# Patient Record
Sex: FEMALE | Race: WHITE | NOT HISPANIC OR LATINO | Employment: OTHER | URBAN - NONMETROPOLITAN AREA
[De-identification: names, ages, dates, MRNs, and addresses within clinical notes are randomized per-mention and may not be internally consistent; named-entity substitution may affect disease eponyms.]

---

## 2023-01-31 ENCOUNTER — APPOINTMENT (OUTPATIENT)
Dept: CARDIOLOGY | Facility: HOSPITAL | Age: 58
DRG: 221 | End: 2023-01-31
Payer: MEDICAID

## 2023-01-31 ENCOUNTER — APPOINTMENT (OUTPATIENT)
Dept: CT IMAGING | Facility: HOSPITAL | Age: 58
DRG: 221 | End: 2023-01-31
Payer: MEDICAID

## 2023-01-31 ENCOUNTER — HOSPITAL ENCOUNTER (INPATIENT)
Facility: HOSPITAL | Age: 58
LOS: 7 days | Discharge: HOME | DRG: 221 | End: 2023-02-07
Attending: SURGERY | Admitting: SURGERY
Payer: MEDICAID

## 2023-01-31 DIAGNOSIS — I71.012 DISSECTION OF DESCENDING THORACIC AORTA: Primary | ICD-10-CM

## 2023-01-31 DIAGNOSIS — Z78.9 DECREASED ACTIVITIES OF DAILY LIVING (ADL): ICD-10-CM

## 2023-01-31 DIAGNOSIS — Z74.09 IMPAIRED FUNCTIONAL MOBILITY AND ACTIVITY TOLERANCE: ICD-10-CM

## 2023-01-31 PROBLEM — R11.2 NAUSEA & VOMITING: Status: ACTIVE | Noted: 2023-01-31

## 2023-01-31 LAB
ABO GROUP BLD: NORMAL
ALBUMIN SERPL-MCNC: 3.7 G/DL (ref 3.5–5.2)
ALBUMIN/GLOB SERPL: 1.2 G/DL
ALP SERPL-CCNC: 143 U/L (ref 39–117)
ALT SERPL W P-5'-P-CCNC: 17 U/L (ref 1–33)
ANION GAP SERPL CALCULATED.3IONS-SCNC: 11 MMOL/L (ref 5–15)
APTT PPP: 37 SECONDS (ref 24.1–35)
AST SERPL-CCNC: 28 U/L (ref 1–32)
BASOPHILS # BLD AUTO: 0.04 10*3/MM3 (ref 0–0.2)
BASOPHILS NFR BLD AUTO: 0.3 % (ref 0–1.5)
BH CV ECHO MEAS - AO MAX PG: 18.7 MMHG
BH CV ECHO MEAS - AO MEAN PG: 8 MMHG
BH CV ECHO MEAS - AO ROOT DIAM: 4.2 CM
BH CV ECHO MEAS - AO V2 MAX: 216 CM/SEC
BH CV ECHO MEAS - AO V2 VTI: 44.5 CM
BH CV ECHO MEAS - AVA(I,D): 2.45 CM2
BH CV ECHO MEAS - EDV(CUBED): 117.6 ML
BH CV ECHO MEAS - EDV(MOD-SP4): 132 ML
BH CV ECHO MEAS - EF(MOD-SP4): 63.3 %
BH CV ECHO MEAS - ESV(CUBED): 17.6 ML
BH CV ECHO MEAS - ESV(MOD-SP4): 48.4 ML
BH CV ECHO MEAS - FS: 46.9 %
BH CV ECHO MEAS - IVS/LVPW: 1.18 CM
BH CV ECHO MEAS - IVSD: 1.3 CM
BH CV ECHO MEAS - LA DIMENSION: 4 CM
BH CV ECHO MEAS - LAT PEAK E' VEL: 4.9 CM/SEC
BH CV ECHO MEAS - LV DIASTOLIC VOL/BSA (35-75): 66.7 CM2
BH CV ECHO MEAS - LV MASS(C)D: 226.4 GRAMS
BH CV ECHO MEAS - LV MAX PG: 8.1 MMHG
BH CV ECHO MEAS - LV MEAN PG: 4 MMHG
BH CV ECHO MEAS - LV SYSTOLIC VOL/BSA (12-30): 24.5 CM2
BH CV ECHO MEAS - LV V1 MAX: 142 CM/SEC
BH CV ECHO MEAS - LV V1 VTI: 34.7 CM
BH CV ECHO MEAS - LVIDD: 4.9 CM
BH CV ECHO MEAS - LVIDS: 2.6 CM
BH CV ECHO MEAS - LVOT AREA: 3.1 CM2
BH CV ECHO MEAS - LVOT DIAM: 2 CM
BH CV ECHO MEAS - LVPWD: 1.1 CM
BH CV ECHO MEAS - MED PEAK E' VEL: 4.6 CM/SEC
BH CV ECHO MEAS - MV A MAX VEL: 87.4 CM/SEC
BH CV ECHO MEAS - MV DEC TIME: 0.27 MSEC
BH CV ECHO MEAS - MV E MAX VEL: 106 CM/SEC
BH CV ECHO MEAS - MV E/A: 1.21
BH CV ECHO MEAS - RAP SYSTOLE: 5 MMHG
BH CV ECHO MEAS - RVSP: 19.4 MMHG
BH CV ECHO MEAS - SI(MOD-SP4): 42.2 ML/M2
BH CV ECHO MEAS - SV(LVOT): 109 ML
BH CV ECHO MEAS - SV(MOD-SP4): 83.6 ML
BH CV ECHO MEAS - TR MAX PG: 14.4 MMHG
BH CV ECHO MEAS - TR MAX VEL: 190 CM/SEC
BH CV ECHO MEASUREMENTS AVERAGE E/E' RATIO: 22.32
BILIRUB SERPL-MCNC: 1 MG/DL (ref 0–1.2)
BLD GP AB SCN SERPL QL: NEGATIVE
BUN SERPL-MCNC: 17 MG/DL (ref 6–20)
BUN/CREAT SERPL: 47.2 (ref 7–25)
CALCIUM SPEC-SCNC: 8.7 MG/DL (ref 8.6–10.5)
CHLORIDE SERPL-SCNC: 105 MMOL/L (ref 98–107)
CO2 SERPL-SCNC: 27 MMOL/L (ref 22–29)
CREAT SERPL-MCNC: 0.36 MG/DL (ref 0.57–1)
D-LACTATE SERPL-SCNC: 1 MMOL/L (ref 0.5–2)
DEPRECATED RDW RBC AUTO: 50.1 FL (ref 37–54)
EGFRCR SERPLBLD CKD-EPI 2021: 118.6 ML/MIN/1.73
EOSINOPHIL # BLD AUTO: 0.13 10*3/MM3 (ref 0–0.4)
EOSINOPHIL NFR BLD AUTO: 0.9 % (ref 0.3–6.2)
ERYTHROCYTE [DISTWIDTH] IN BLOOD BY AUTOMATED COUNT: 14.3 % (ref 12.3–15.4)
GLOBULIN UR ELPH-MCNC: 3.1 GM/DL
GLUCOSE SERPL-MCNC: 125 MG/DL (ref 65–99)
HBA1C MFR BLD: 5.3 % (ref 4.8–5.6)
HCT VFR BLD AUTO: 41.7 % (ref 34–46.6)
HGB BLD-MCNC: 13.5 G/DL (ref 12–15.9)
IMM GRANULOCYTES # BLD AUTO: 0.08 10*3/MM3 (ref 0–0.05)
IMM GRANULOCYTES NFR BLD AUTO: 0.6 % (ref 0–0.5)
INR PPP: 1.38 (ref 0.91–1.09)
LYMPHOCYTES # BLD AUTO: 1.06 10*3/MM3 (ref 0.7–3.1)
LYMPHOCYTES NFR BLD AUTO: 7.3 % (ref 19.6–45.3)
MAXIMAL PREDICTED HEART RATE: 163 BPM
MCH RBC QN AUTO: 30.8 PG (ref 26.6–33)
MCHC RBC AUTO-ENTMCNC: 32.4 G/DL (ref 31.5–35.7)
MCV RBC AUTO: 95 FL (ref 79–97)
MONOCYTES # BLD AUTO: 1.71 10*3/MM3 (ref 0.1–0.9)
MONOCYTES NFR BLD AUTO: 11.8 % (ref 5–12)
NEUTROPHILS NFR BLD AUTO: 11.42 10*3/MM3 (ref 1.7–7)
NEUTROPHILS NFR BLD AUTO: 79.1 % (ref 42.7–76)
NRBC BLD AUTO-RTO: 0 /100 WBC (ref 0–0.2)
PLATELET # BLD AUTO: 205 10*3/MM3 (ref 140–450)
PMV BLD AUTO: 11.1 FL (ref 6–12)
POTASSIUM SERPL-SCNC: 3.9 MMOL/L (ref 3.5–5.2)
PROT SERPL-MCNC: 6.8 G/DL (ref 6–8.5)
PROTHROMBIN TIME: 17.2 SECONDS (ref 11.8–14.8)
RBC # BLD AUTO: 4.39 10*6/MM3 (ref 3.77–5.28)
RH BLD: NEGATIVE
SODIUM SERPL-SCNC: 143 MMOL/L (ref 136–145)
STRESS TARGET HR: 139 BPM
T&S EXPIRATION DATE: NORMAL
WBC NRBC COR # BLD: 14.44 10*3/MM3 (ref 3.4–10.8)

## 2023-01-31 PROCEDURE — 86901 BLOOD TYPING SEROLOGIC RH(D): CPT | Performed by: NURSE PRACTITIONER

## 2023-01-31 PROCEDURE — 86900 BLOOD TYPING SEROLOGIC ABO: CPT | Performed by: NURSE PRACTITIONER

## 2023-01-31 PROCEDURE — 74174 CTA ABD&PLVS W/CONTRAST: CPT

## 2023-01-31 PROCEDURE — 83605 ASSAY OF LACTIC ACID: CPT | Performed by: NURSE PRACTITIONER

## 2023-01-31 PROCEDURE — 85610 PROTHROMBIN TIME: CPT | Performed by: NURSE PRACTITIONER

## 2023-01-31 PROCEDURE — 0 IOPAMIDOL PER 1 ML: Performed by: SURGERY

## 2023-01-31 PROCEDURE — 71275 CT ANGIOGRAPHY CHEST: CPT

## 2023-01-31 PROCEDURE — 85025 COMPLETE CBC W/AUTO DIFF WBC: CPT | Performed by: NURSE PRACTITIONER

## 2023-01-31 PROCEDURE — 99223 1ST HOSP IP/OBS HIGH 75: CPT | Performed by: SURGERY

## 2023-01-31 PROCEDURE — 86850 RBC ANTIBODY SCREEN: CPT | Performed by: NURSE PRACTITIONER

## 2023-01-31 PROCEDURE — 93306 TTE W/DOPPLER COMPLETE: CPT

## 2023-01-31 PROCEDURE — 93306 TTE W/DOPPLER COMPLETE: CPT | Performed by: INTERNAL MEDICINE

## 2023-01-31 PROCEDURE — 83036 HEMOGLOBIN GLYCOSYLATED A1C: CPT | Performed by: NURSE PRACTITIONER

## 2023-01-31 PROCEDURE — 25010000002 ENOXAPARIN PER 10 MG: Performed by: NURSE PRACTITIONER

## 2023-01-31 PROCEDURE — 85730 THROMBOPLASTIN TIME PARTIAL: CPT | Performed by: NURSE PRACTITIONER

## 2023-01-31 PROCEDURE — 80053 COMPREHEN METABOLIC PANEL: CPT | Performed by: NURSE PRACTITIONER

## 2023-01-31 RX ORDER — PHENYTOIN SODIUM 100 MG/1
500 CAPSULE, EXTENDED RELEASE ORAL NIGHTLY
COMMUNITY

## 2023-01-31 RX ORDER — ESMOLOL HYDROCHLORIDE 10 MG/ML
25-300 INJECTION, SOLUTION INTRAVENOUS
Status: DISCONTINUED | OUTPATIENT
Start: 2023-01-31 | End: 2023-02-01

## 2023-01-31 RX ORDER — CARVEDILOL 6.25 MG/1
6.25 TABLET ORAL 2 TIMES DAILY WITH MEALS
Status: DISCONTINUED | OUTPATIENT
Start: 2023-01-31 | End: 2023-02-02

## 2023-01-31 RX ORDER — SODIUM CHLORIDE 0.9 % (FLUSH) 0.9 %
10 SYRINGE (ML) INJECTION AS NEEDED
Status: DISCONTINUED | OUTPATIENT
Start: 2023-01-31 | End: 2023-02-07 | Stop reason: HOSPADM

## 2023-01-31 RX ORDER — FLUOXETINE HYDROCHLORIDE 40 MG/1
40 CAPSULE ORAL DAILY
COMMUNITY

## 2023-01-31 RX ORDER — SODIUM CHLORIDE 9 MG/ML
40 INJECTION, SOLUTION INTRAVENOUS AS NEEDED
Status: DISCONTINUED | OUTPATIENT
Start: 2023-01-31 | End: 2023-02-07 | Stop reason: HOSPADM

## 2023-01-31 RX ORDER — PHENYTOIN SODIUM 100 MG/1
500 CAPSULE, EXTENDED RELEASE ORAL NIGHTLY
Status: DISCONTINUED | OUTPATIENT
Start: 2023-01-31 | End: 2023-02-07 | Stop reason: HOSPADM

## 2023-01-31 RX ORDER — CEPHALEXIN 500 MG/1
500 CAPSULE ORAL 3 TIMES DAILY
COMMUNITY
Start: 2023-01-30 | End: 2023-02-07 | Stop reason: HOSPADM

## 2023-01-31 RX ORDER — SODIUM CHLORIDE 0.9 % (FLUSH) 0.9 %
10 SYRINGE (ML) INJECTION EVERY 12 HOURS SCHEDULED
Status: DISCONTINUED | OUTPATIENT
Start: 2023-01-31 | End: 2023-02-07 | Stop reason: HOSPADM

## 2023-01-31 RX ORDER — ASPIRIN 81 MG/1
81 TABLET, CHEWABLE ORAL DAILY
COMMUNITY
End: 2023-02-07 | Stop reason: HOSPADM

## 2023-01-31 RX ORDER — ATORVASTATIN CALCIUM 10 MG/1
10 TABLET, FILM COATED ORAL DAILY
COMMUNITY

## 2023-01-31 RX ORDER — ATORVASTATIN CALCIUM 10 MG/1
10 TABLET, FILM COATED ORAL DAILY
Status: DISCONTINUED | OUTPATIENT
Start: 2023-01-31 | End: 2023-02-07 | Stop reason: HOSPADM

## 2023-01-31 RX ORDER — ENOXAPARIN SODIUM 100 MG/ML
40 INJECTION SUBCUTANEOUS EVERY 24 HOURS
Status: DISCONTINUED | OUTPATIENT
Start: 2023-01-31 | End: 2023-02-02 | Stop reason: SDUPTHER

## 2023-01-31 RX ORDER — FLUOXETINE HYDROCHLORIDE 20 MG/1
40 CAPSULE ORAL DAILY
Status: DISCONTINUED | OUTPATIENT
Start: 2023-01-31 | End: 2023-02-07 | Stop reason: HOSPADM

## 2023-01-31 RX ORDER — HYDROCODONE BITARTRATE AND ACETAMINOPHEN 5; 325 MG/1; MG/1
1 TABLET ORAL EVERY 6 HOURS PRN
Status: DISCONTINUED | OUTPATIENT
Start: 2023-01-31 | End: 2023-02-07 | Stop reason: HOSPADM

## 2023-01-31 RX ORDER — LOSARTAN POTASSIUM 50 MG/1
25 TABLET ORAL
Status: DISCONTINUED | OUTPATIENT
Start: 2023-01-31 | End: 2023-02-03

## 2023-01-31 RX ADMIN — FLUOXETINE HYDROCHLORIDE 40 MG: 20 CAPSULE ORAL at 16:41

## 2023-01-31 RX ADMIN — CARVEDILOL 6.25 MG: 6.25 TABLET, FILM COATED ORAL at 18:29

## 2023-01-31 RX ADMIN — Medication 10 ML: at 21:05

## 2023-01-31 RX ADMIN — Medication 1 APPLICATION: at 21:05

## 2023-01-31 RX ADMIN — ATORVASTATIN CALCIUM 10 MG: 10 TABLET, FILM COATED ORAL at 16:41

## 2023-01-31 RX ADMIN — NICARDIPINE HYDROCHLORIDE 15 MG/HR: 2.5 INJECTION, SOLUTION INTRAVENOUS at 13:45

## 2023-01-31 RX ADMIN — Medication 1 APPLICATION: at 14:33

## 2023-01-31 RX ADMIN — ENOXAPARIN SODIUM 40 MG: 100 INJECTION SUBCUTANEOUS at 16:41

## 2023-01-31 RX ADMIN — LOSARTAN POTASSIUM 25 MG: 50 TABLET, FILM COATED ORAL at 16:41

## 2023-01-31 RX ADMIN — IOPAMIDOL 100 ML: 755 INJECTION, SOLUTION INTRAVENOUS at 14:23

## 2023-01-31 RX ADMIN — SODIUM CHLORIDE 15 MG/HR: 9 INJECTION, SOLUTION INTRAVENOUS at 13:45

## 2023-01-31 RX ADMIN — HYDROCODONE BITARTRATE AND ACETAMINOPHEN 1 TABLET: 5; 325 TABLET ORAL at 18:29

## 2023-01-31 RX ADMIN — ESMOLOL HYDROCHLORIDE 25 MCG/KG/MIN: 10 INJECTION INTRAVENOUS at 14:29

## 2023-01-31 RX ADMIN — Medication 10 ML: at 14:33

## 2023-01-31 RX ADMIN — PHENYTOIN SODIUM 500 MG: 100 CAPSULE ORAL at 21:05

## 2023-02-01 ENCOUNTER — ANESTHESIA EVENT (OUTPATIENT)
Dept: PERIOP | Facility: HOSPITAL | Age: 58
DRG: 221 | End: 2023-02-01
Payer: MEDICAID

## 2023-02-01 PROBLEM — I71.00 AORTIC DISSECTION: Status: ACTIVE | Noted: 2023-02-01

## 2023-02-01 LAB
ANION GAP SERPL CALCULATED.3IONS-SCNC: 9 MMOL/L (ref 5–15)
BASOPHILS # BLD AUTO: 0.02 10*3/MM3 (ref 0–0.2)
BASOPHILS NFR BLD AUTO: 0.2 % (ref 0–1.5)
BUN SERPL-MCNC: 21 MG/DL (ref 6–20)
BUN/CREAT SERPL: 60 (ref 7–25)
CALCIUM SPEC-SCNC: 8 MG/DL (ref 8.6–10.5)
CHLORIDE SERPL-SCNC: 104 MMOL/L (ref 98–107)
CO2 SERPL-SCNC: 27 MMOL/L (ref 22–29)
CREAT SERPL-MCNC: 0.35 MG/DL (ref 0.57–1)
DEPRECATED RDW RBC AUTO: 51.8 FL (ref 37–54)
EGFRCR SERPLBLD CKD-EPI 2021: 119.4 ML/MIN/1.73
EOSINOPHIL # BLD AUTO: 0.49 10*3/MM3 (ref 0–0.4)
EOSINOPHIL NFR BLD AUTO: 5 % (ref 0.3–6.2)
ERYTHROCYTE [DISTWIDTH] IN BLOOD BY AUTOMATED COUNT: 14.7 % (ref 12.3–15.4)
GLUCOSE SERPL-MCNC: 100 MG/DL (ref 65–99)
HCT VFR BLD AUTO: 39.4 % (ref 34–46.6)
HGB BLD-MCNC: 12.7 G/DL (ref 12–15.9)
IMM GRANULOCYTES # BLD AUTO: 0.03 10*3/MM3 (ref 0–0.05)
IMM GRANULOCYTES NFR BLD AUTO: 0.3 % (ref 0–0.5)
LYMPHOCYTES # BLD AUTO: 1.49 10*3/MM3 (ref 0.7–3.1)
LYMPHOCYTES NFR BLD AUTO: 15.3 % (ref 19.6–45.3)
MCH RBC QN AUTO: 30.8 PG (ref 26.6–33)
MCHC RBC AUTO-ENTMCNC: 32.2 G/DL (ref 31.5–35.7)
MCV RBC AUTO: 95.4 FL (ref 79–97)
MONOCYTES # BLD AUTO: 1.35 10*3/MM3 (ref 0.1–0.9)
MONOCYTES NFR BLD AUTO: 13.8 % (ref 5–12)
NEUTROPHILS NFR BLD AUTO: 6.39 10*3/MM3 (ref 1.7–7)
NEUTROPHILS NFR BLD AUTO: 65.4 % (ref 42.7–76)
NRBC BLD AUTO-RTO: 0 /100 WBC (ref 0–0.2)
PLATELET # BLD AUTO: 174 10*3/MM3 (ref 140–450)
PMV BLD AUTO: 11 FL (ref 6–12)
POTASSIUM SERPL-SCNC: 4 MMOL/L (ref 3.5–5.2)
RBC # BLD AUTO: 4.13 10*6/MM3 (ref 3.77–5.28)
SODIUM SERPL-SCNC: 140 MMOL/L (ref 136–145)
WBC NRBC COR # BLD: 9.77 10*3/MM3 (ref 3.4–10.8)

## 2023-02-01 PROCEDURE — 25010000002 ENOXAPARIN PER 10 MG: Performed by: NURSE PRACTITIONER

## 2023-02-01 PROCEDURE — 99024 POSTOP FOLLOW-UP VISIT: CPT | Performed by: SURGERY

## 2023-02-01 PROCEDURE — 85025 COMPLETE CBC W/AUTO DIFF WBC: CPT | Performed by: NURSE PRACTITIONER

## 2023-02-01 PROCEDURE — 80048 BASIC METABOLIC PNL TOTAL CA: CPT | Performed by: NURSE PRACTITIONER

## 2023-02-01 PROCEDURE — 99222 1ST HOSP IP/OBS MODERATE 55: CPT | Performed by: SURGERY

## 2023-02-01 RX ORDER — PANTOPRAZOLE SODIUM 40 MG/10ML
40 INJECTION, POWDER, LYOPHILIZED, FOR SOLUTION INTRAVENOUS
Status: DISCONTINUED | OUTPATIENT
Start: 2023-02-02 | End: 2023-02-07 | Stop reason: HOSPADM

## 2023-02-01 RX ORDER — ESMOLOL HYDROCHLORIDE 10 MG/ML
25-300 INJECTION INTRAVENOUS CONTINUOUS
Status: DISCONTINUED | OUTPATIENT
Start: 2023-02-01 | End: 2023-02-02

## 2023-02-01 RX ORDER — CALCIUM CARBONATE 200(500)MG
2 TABLET,CHEWABLE ORAL 3 TIMES DAILY PRN
Status: DISCONTINUED | OUTPATIENT
Start: 2023-02-01 | End: 2023-02-07 | Stop reason: HOSPADM

## 2023-02-01 RX ORDER — FAMOTIDINE 10 MG/ML
20 INJECTION, SOLUTION INTRAVENOUS EVERY 12 HOURS SCHEDULED
Status: DISCONTINUED | OUTPATIENT
Start: 2023-02-01 | End: 2023-02-02 | Stop reason: SDUPTHER

## 2023-02-01 RX ADMIN — NICARDIPINE HYDROCHLORIDE 7.5 MG/HR: 2.5 INJECTION, SOLUTION INTRAVENOUS at 23:44

## 2023-02-01 RX ADMIN — ATORVASTATIN CALCIUM 10 MG: 10 TABLET, FILM COATED ORAL at 08:27

## 2023-02-01 RX ADMIN — NICARDIPINE HYDROCHLORIDE 12.5 MG/HR: 2.5 INJECTION, SOLUTION INTRAVENOUS at 05:37

## 2023-02-01 RX ADMIN — NICARDIPINE HYDROCHLORIDE 5 MG/HR: 2.5 INJECTION, SOLUTION INTRAVENOUS at 03:21

## 2023-02-01 RX ADMIN — NICARDIPINE HYDROCHLORIDE 15 MG/HR: 2.5 INJECTION, SOLUTION INTRAVENOUS at 07:39

## 2023-02-01 RX ADMIN — Medication 10 ML: at 20:00

## 2023-02-01 RX ADMIN — NICARDIPINE HYDROCHLORIDE 5 MG/HR: 2.5 INJECTION, SOLUTION INTRAVENOUS at 19:37

## 2023-02-01 RX ADMIN — LOSARTAN POTASSIUM 25 MG: 50 TABLET, FILM COATED ORAL at 08:27

## 2023-02-01 RX ADMIN — PHENYTOIN SODIUM 500 MG: 100 CAPSULE ORAL at 20:00

## 2023-02-01 RX ADMIN — ENOXAPARIN SODIUM 40 MG: 100 INJECTION SUBCUTANEOUS at 16:21

## 2023-02-01 RX ADMIN — CARVEDILOL 6.25 MG: 6.25 TABLET, FILM COATED ORAL at 18:17

## 2023-02-01 RX ADMIN — FAMOTIDINE 20 MG: 10 INJECTION, SOLUTION INTRAVENOUS at 21:13

## 2023-02-01 RX ADMIN — Medication 1 APPLICATION: at 08:28

## 2023-02-01 RX ADMIN — ANTACID TABLETS 2 TABLET: 500 TABLET, CHEWABLE ORAL at 23:33

## 2023-02-01 RX ADMIN — Medication 10 ML: at 08:28

## 2023-02-01 RX ADMIN — CARVEDILOL 6.25 MG: 6.25 TABLET, FILM COATED ORAL at 08:28

## 2023-02-01 RX ADMIN — Medication 1 APPLICATION: at 20:00

## 2023-02-01 RX ADMIN — FLUOXETINE HYDROCHLORIDE 40 MG: 20 CAPSULE ORAL at 08:28

## 2023-02-01 NOTE — CASE MANAGEMENT/SOCIAL WORK
Discharge Planning Assessment  [facility]     Patient Name: Shilpa Zhang  MRN: [id number]  Today's Date: 2/1/2023    Admit Date: 1/31/2023        Discharge Needs Assessment     Row Name 02/01/23 1038       Living Environment    People in Home alone    Current Living Arrangements home    Primary Care Provided by self    Provides Primary Care For no one    Family Caregiver if Needed child(miroslava), adult;friend(s)    Family Caregiver Names Eva    Able to Return to Prior Arrangements yes       Resource/Environmental Concerns    Resource/Environmental Concerns none       Food Insecurity    Within the past 12 months, you worried that your food would run out before you got the money to buy more. Never true    Within the past 12 months, the food you bought just didn't last and you didn't have money to get more. Never true       Transition Planning    Patient/Family Anticipates Transition to home    Transportation Anticipated family or friend will provide       Discharge Needs Assessment    Readmission Within the Last 30 Days no previous admission in last 30 days    Equipment Currently Used at Home none    Concerns to be Addressed no discharge needs identified    Equipment Needed After Discharge none    Discharge Coordination/Progress spoke to patient who lives alone; has no insurance coverage filled out medicaid paperwork at [facility] does have PCP; independent at home prior to illness will follow for DC needs  Spoke to [employer] worker and they will talk to patient about medicaid application will follow               Discharge Plan    No documentation.               Continued Care and Services - Admitted Since 1/31/2023    Coordination has not been started for this encounter.          Demographic Summary    No documentation.                Functional Status    No documentation.                Psychosocial    No documentation.                Abuse/Neglect    No documentation.                Legal    No documentation.                 Substance Abuse    No documentation.                Patient Forms    No documentation.                   Jayde Levi RN

## 2023-02-01 NOTE — PLAN OF CARE
Goal Outcome Evaluation:  Plan of Care Reviewed With: patient        Progress: no change  Outcome Evaluation: Ntn assessment completed. Pt reports she has had a decrease in appetite over the past few months. Encouraged oral intake with meals. Cont to follow for plan of care.

## 2023-02-01 NOTE — PLAN OF CARE
Goal Outcome Evaluation:      Pt remained A&Ox4 and afebrile throughout the shift. PERRLA. Pt was on Esmolol and needed to be titrated up due to increasing BP. Shortly after titrating up, she began to rosemary down into the 40s. Pt was switched to cardene drip. VSS. Cardene currently at 15. Pulses palpable. Urine output adequate. Urine is a dark remigio color. No current complaints of pain. Currently sitting up in chair. Safety maintained.

## 2023-02-01 NOTE — CONSULTS
Shilpa Zhang  [id number]  [id number]  I004/1  Riley Jefferson MD  1/31/2023    CC:ERICK    HPI: Ms. Zhang is a 57-year-old female with known history of hyperlipidemia and seizure disorder.  She reports that she developed upper respiratory-like symptoms approximately 1 week ago along with dull aching back pain between her shoulder blades.  She was treated as an outpatient with antibiotics however she noticed no improvement.  She does admit that she had an episode of vomiting with this pain x1 event 4 days ago.  At this time she went to stay with her dad because she did not feel well.  She reports increasing fatigue and difficulty getting up and moving around since symptoms began.  Back pain continue to linger which prompted her to present to [facility] ER today for further evaluation.  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  Given this she was transferred to [facility] for higher level of care.  During transport she was transfused 1 unit PRBC.  She does complain of ongoing back pain but states at present the pain is dull.  She is a non-smoker.  She had a CTA of the chest/abdomen/pelvis in which I personally reviewed.    History reviewed. No pertinent past medical history.    Past Surgical History:   Procedure Laterality Date   • ABDOMINAL SURGERY         Family History   Problem Relation Age of Onset   • Diabetes Mother    • Diabetes Father        Social History     Socioeconomic History   • Marital status:    Tobacco Use   • Smoking status: Never   • Smokeless tobacco: Never   Vaping Use   • Vaping Use: Never used   Substance and Sexual Activity   • Drug use: Never   • Sexual activity: Not Currently       No Known Allergies    Hospital Medications (active)       Dose Frequency Start End    atorvastatin  (LIPITOR) tablet 10 mg 10 mg Daily 1/31/2023     Admin Instructions: Avoid grapefruit juice.    Route: Oral    carvedilol (COREG) tablet 6.25 mg 6.25 mg 2 Times Daily With Meals 1/31/2023     Admin Instructions: Give with food.    Route: Oral    Enoxaparin Sodium (LOVENOX) syringe 40 mg 40 mg Every 24 Hours 1/31/2023     Admin Instructions: Give subcutaneous in abdomen only. Do not massage site after injection.    Route: Subcutaneous    esmolol (BREVIBLOC) 2500 mg in 250 mL infusion  mcg/kg/min × 88.6 kg Continuous 2/1/2023     Admin Instructions: Initiate infusion at 25 mcg/kg/min and titrate up or down by 25-50 mcg/kg/min every 4 minutes to use the lowest dose possible to maintain heart rate less than 120 or SBP less than 120 mm Hg. Hold infusion for SBP less than 90 mm Hg or heart rate less Than 60. Maximum dose = 300 mcg/kg/min. Contact provider if unable to maintain SBP less than 120 mm Hg or heart rate less than 120 on maximum dose. Once SBP target or heart rate achieved obtain vitals a minimum of every 30 minutes. For patients not in critical care areas - obtain vitals a minimum of every 4 hours.    Route: Intravenous    FLUoxetine (PROzac) capsule 40 mg 40 mg Daily 1/31/2023     Admin Instructions: Caution: Look alike/sound alike drug alert    Route: Oral    HYDROcodone-acetaminophen (NORCO) 5-325 MG per tablet 1 tablet 1 tablet Every 6 Hours PRN 1/31/2023 2/7/2023    Admin Instructions: Based on patient request - if ordered for moderate or severe pain, provider allows for administration of a medication prescribed for a lower pain scale.  [LEXIS]    Do not exceed 4 grams of acetaminophen in a 24 hr period. Max dose of 2gm for AST/ALT greater than 120 units/L        If given for pain, use the following pain scale:   Mild Pain = Pain Score of 1-3, CPOT 1-2  Moderate Pain = Pain Score of 4-6, CPOT 3-4  Severe Pain = Pain Score of 7-10, CPOT 5-8    Route: Oral    losartan (COZAAR) tablet 25 mg 25 mg Every 24  Hours Scheduled 1/31/2023     Route: Oral    mupirocin (BACTROBAN) 2 % nasal ointment 1 application 1 application 2 Times Daily 1/31/2023 2/5/2023    Admin Instructions: Begin on day 1 of ICU admission and continue for 5 days OR until critical care discharge (if prior to 5 days).      Route: Each Nare    niCARdipine (CARDENE) 25 mg in 250 mL NS infusion 5-15 mg/hr Titrated 1/31/2023     Admin Instructions: Initiate infusion at 5 mg/hr and titrate up or down by 2.5 - 5 mg/hr every 15 minutes to use the lowest dose possible to maintain SBP less than 120 mm Hg. Maximum dose = 15 mg/hr. Hold infusion for SBP less than 90 mm Hg. Contact provider if unable to maintain SBP Less Than 120 mm Hg on maximum dose. Once SBP target achieved obtain vitals a minimum of every 30 minutes.   Administer as a slow continuous infusion via central line or through a large peripheral vein. Peripheral venous irritation may be minimized by changing the site of infusion every 12 hours. Nicardipine 0.2mg/mL concentration must be infused via central line ONLY.  Caution: Look alike/sound alike drug alert.   Protect from light. Do NOT refrigerate.    Route: Intravenous    phenytoin ER (DILANTIN) capsule 500 mg 500 mg Nightly 1/31/2023     Admin Instructions: Do not crush. Hold enteral nutrition at least 1 hour before and 2 hours after dose. Monitor drug levels.    Route: Oral    sodium chloride 0.9 % flush 10 mL 10 mL Every 12 Hours Scheduled 1/31/2023     Route: Intravenous    sodium chloride 0.9 % flush 10 mL 10 mL As Needed 1/31/2023     Route: Intravenous    sodium chloride 0.9 % infusion 40 mL 40 mL As Needed 1/31/2023     Admin Instructions: Following administration of an IV intermittent medication, flush line with 40mL NS at 100mL/hr.    Route: Intravenous          Review of Systems   Constitutional: Negative.    HENT: Negative.    Eyes: Negative.    Cardiovascular: Positive for chest pain.   Gastrointestinal: Negative.    Endocrine:  "Negative.    Genitourinary: Negative.    Musculoskeletal: Positive for back pain.   Skin: Negative.    Allergic/Immunologic: Negative.    Neurological: Negative.    Hematological: Negative.    Psychiatric/Behavioral: Negative.    All other systems reviewed and are negative.      /54   Pulse 62   Temp 97.4 °F (36.3 °C) (Axillary)   Resp 20   Ht 167.6 cm (66\")   Wt 88.6 kg (195 lb 6.4 oz)   SpO2 95%   BMI 31.54 kg/m²     Physical Exam  Vitals and nursing note reviewed.   Constitutional:       Appearance: She is well-developed.   HENT:      Head: Normocephalic and atraumatic.   Eyes:      General: No scleral icterus.     Pupils: Pupils are equal, round, and reactive to light.   Neck:      Thyroid: No thyromegaly.      Vascular: No carotid bruit or JVD.   Cardiovascular:      Rate and Rhythm: Normal rate and regular rhythm.      Pulses:           Carotid pulses are 2+ on the right side and 2+ on the left side.       Femoral pulses are 2+ on the right side and 2+ on the left side.       Popliteal pulses are 2+ on the right side and 2+ on the left side.        Dorsalis pedis pulses are 2+ on the right side and 2+ on the left side.        Posterior tibial pulses are 2+ on the right side and 2+ on the left side.      Heart sounds: Normal heart sounds.   Pulmonary:      Effort: Pulmonary effort is normal.      Breath sounds: Normal breath sounds.   Abdominal:      General: Bowel sounds are normal. There is no distension or abdominal bruit.      Palpations: Abdomen is soft. There is no mass.      Tenderness: There is abdominal tenderness.      Comments: Obese   Musculoskeletal:         General: Normal range of motion.      Cervical back: Neck supple.   Lymphadenopathy:      Cervical: No cervical adenopathy.   Skin:     General: Skin is warm and dry.   Neurological:      Mental Status: She is alert and oriented to person, place, and time.      Cranial Nerves: No cranial nerve deficit.      Sensory: No sensory " deficit.         Laboratory Data:  Results from last 7 days   Lab Units 02/01/23  0503 01/31/23  1406   WBC 10*3/mm3 9.77 14.44*   HEMOGLOBIN g/dL 12.7 13.5   HEMATOCRIT % 39.4 41.7   PLATELETS 10*3/mm3 174 205       Results from last 7 days   Lab Units 02/01/23  0503 01/31/23  1406   SODIUM mmol/L 140 143   POTASSIUM mmol/L 4.0 3.9   CHLORIDE mmol/L 104 105   CO2 mmol/L 27.0 27.0   BUN mg/dL 21* 17   CREATININE mg/dL 0.35* 0.36*   CALCIUM mg/dL 8.0* 8.7   BILIRUBIN mg/dL  --  1.0   ALK PHOS U/L  --  143*   ALT (SGPT) U/L  --  17   AST (SGOT) U/L  --  28   GLUCOSE mg/dL 100* 125*     Results from last 7 days   Lab Units 01/31/23  1406   INR  1.38*   APTT seconds 37.0*         Diagnostic Data:  Imaging Results (Last 24 Hours)     Procedure Component Value Units Date/Time    CT Angiogram Chest [[id number]] Collected: 01/31/23 1450     Updated: 01/31/23 1501    Narrative:      CT ANGIOGRAM CHEST- 1/31/2023 2:09 PM CST     HISTORY: evaluate aortic dissection      COMPARISON: None     DOSE LENGTH PRODUCT: 1118 mGy cm. Automated exposure control was also  utilized to decrease patient radiation dose.     TECHNIQUE: Axial images of the chest are performed following IV  contrast. 2-D, 3-D, maximal intensity projectional reconstructed images  reveal     FINDINGS:  There is a thoracic aortic dissecting aneurysm involving the  distal thoracic aortic arch, originating just beyond the origin of the  left subclavian artery. The root of the ascending aorta measures 4.7 cm.  Ascending aorta at the level of the main pulmonary artery measuring 4.6  cm. The proximal aortic arch measures 2.9 cm. Common origin right  brachiocephalic and left common carotid arteries considered from the  arch of the aorta. No aneurysm or dissection extending into the great  vessels.     The distal aortic arch measures approximately 5.5 cm in greatest width  with notable intimal flap. The descending thoracic aorta at the level of  the left atrium measures  approximately 5.2 cm. The aorta at the level of  the diaphragmatic hiatus measuring 4.7 cm. The true lumen is position  along the right aspect of the descending thoracic aorta. No evidence for  aneurysm leak. The there is cardiomegaly. No paracardial effusion. Trace  left pleural effusion. No pathologic intrathoracic or axillary  lymphadenopathy.     Please refer to CT abdomen and pelvis report for findings below the  hemidiaphragms.     Bibasilar atelectasis. No lobar consolidation or suspicious pulmonary  nodule. No pneumothorax. No endobronchial lesion.     Degenerative change of the thoracic spine. No focal destructive osseous  lesions.       Impression:      1. Thoracic aortic aneurysm with dissection. Dissection again within the  distal aortic arch beyond/distal to the origin of the left subclavian  artery. The ascending thoracic aorta without dissection measuring up to  4.7 cm. The distal arch of the aorta measuring up to 5.5 cm.  Cardiomegaly. No paracardial effusion.  2. Small left pleural effusion with basilar atelectasis.  This report was finalized on 01/31/2023 14:58 by Dr. Chantelle Wan MD.    CT Angiogram Abdomen Pelvis [[id number]] Collected: 01/31/23 1443     Updated: 01/31/23 1453    Narrative:      CT ANGIOGRAM ABDOMEN PELVIS- 1/31/2023 2:09 PM CST     HISTORY: evaluate aortic dissection      COMPARISON: None     DOSE LENGTH PRODUCT: 1118 mGy cm. Automated exposure control was also  utilized to decrease patient radiation dose.     TECHNIQUE: Axial images of the abdomen and pelvis are performed  following IV contrast. 2-D and maximal intensity projectional  reconstructed images are reviewed.     FINDINGS:  There is a thoracoabdominal aortic dissection beginning at  the arch of the thoracic aorta extending through the aortic bifurcation  involving the left common iliac artery. The aorta at the diaphragmatic  hiatus measures 4.5 cm. The celiac trunk, superior mesenteric, bilateral  renal arteries  originate from the true lumen. Inferior mesenteric artery  appears to originate from the false lumen. The infrarenal abdominal  aorta is normal in caliber measuring 2.5 cm. No intimal flap is  identified within the internal/external iliac arteries.     There is cardiomegaly. Small left pleural effusion. Please refer to CT  chest report for findings above the hemidiaphragms.     No focal liver or splenic mass. No pancreatic cyst or mass. Gallbladder  unremarkable. No suspicious adrenal nodule. No enhancing renal mass. No  hydronephrosis. Mcfarland catheter within the decompressed bladder. Bladder  contour the uterus may indicate the presence of leiomyomas. No discrete  adnexal mass. No free intraperitoneal air or loculated abscess. No  evidence for bowel obstruction. Ventral abdominal wall hernia mesh in  place. Marked thinning of the abdominal wall musculature. Small hiatal  hernia. No pathologic lymphadenopathy.     Degenerative change of the regional skeleton bony hemangioma within the  L2 vertebra.       Impression:      1. Thoracoabdominal aortic dissection extending from the thoracic aortic  arch through the aortic bifurcation into the left common iliac artery.  Aorta measuring 4.5 cm at the diaphragmatic hiatus. The celiac trunk,  superior mesenteric, bilateral renal arteries originate from the true  lumen. Inferior mesenteric artery originating from the false lumen. No  radiographic evidence of bowel ischemia.  2. Small hiatal hernia.  3. Previous ventral abdominal wall hernia repair with mesh in place.  4. Small left pleural effusion with cardiomegaly.  This report was finalized on 01/31/2023 14:50 by Dr. Chantelle Wan MD.          Impression:    Aortic dissection (HCC)    Dissection of descending thoracic aorta    Nausea & vomiting  Type B aortic dissection  Ascending aortic aneurysm      Plan: After thoroughly evaluating Tamberly Ray, I believe the best course of action is to proceed with TEVAR with coverage  of the LSA and laser fenestration and stenting of the LSA.  She has a bovine arch which lends to more proximal landing zone where we can land the graft at the takeoff of the innominate artery.  She has intramural hematoma extending up and surrounding the left subclavian artery.  The graft will likely extend down to near the level of the celiac artery.  Because of this, she will need spinal drainage to be performed by either neurosurgery or anesthesia.  Risks/benefits were explained at great length to the patient which include but not limited to bleeding, infection, vessel damage, nerve damage, MI, stroke, paralysis, and death.  The patient understands the risks and wishes for us to proceed.  This case has been extensively discussed with Dr. Jefferson.  Continue with strict blood pressure control.  Thank you for allowing me to see Shilpa Zhang in consult. Please feel free to reach out with any questions or concerns.    Jonnathan Farrar,   2/1/2023  13:44 CST

## 2023-02-01 NOTE — PROGRESS NOTES
"Patient name: Shilpa Zhang  Patient : 1965  VISIT # [id number]  MR #[id number]    Procedure:  Procedure Date:  POD:* No surgery found *    Subjective   Chief complaint: Back pain    Patient is resting in chair in the ICU tolerating room air.  Blood pressure is currently well controlled on Cardene.  Esmolol was stopped overnight due to an episode of bradycardia in the 40s.  Blood pressure currently is 116/40.  She states her back pain is somewhat better but still dull.  Nausea and abdominal pain is improved today.    Telemetry: Sinus rhythm 60s   IV drips: Cardene    ROS: No fevers or chills.  No shortness of breath.  Improving back pain.  No chest pain.       Objective     Visit Vitals  /51   Pulse 63   Temp 97.6 °F (36.4 °C) (Oral)   Resp 20   Ht 167.6 cm (66\")   Wt 88.6 kg (195 lb 6.4 oz)   SpO2 92%   BMI 31.54 kg/m²       Intake/Output Summary (Last 24 hours) at 2023 0846  Last data filed at 2023 0408  Gross per 24 hour   Intake 514.29 ml   Output 650 ml   Net -135.71 ml       Lab:     CBC:  Results from last 7 days   Lab Units 23  0503 23  1406   WBC 10*3/mm3 9.77 14.44*   HEMATOCRIT % 39.4 41.7   PLATELETS 10*3/mm3 174 205          BMP:  Results from last 7 days   Lab Units 23  0503 23  1406   SODIUM mmol/L 140 143   POTASSIUM mmol/L 4.0 3.9   CHLORIDE mmol/L 104 105   CO2 mmol/L 27.0 27.0   GLUCOSE mg/dL 100* 125*   BUN mg/dL 21* 17   CREATININE mg/dL 0.35* 0.36*          COAG:  Results from last 7 days   Lab Units 23  1406   INR  1.38*   APTT seconds 37.0*       IMAGES:       Imaging Results (Last 24 Hours)     Procedure Component Value Units Date/Time    CT Angiogram Chest [[id number]] Collected: 23 1450     Updated: 23 1501    Narrative:      CT ANGIOGRAM CHEST- 2023 2:09 PM CST     HISTORY: evaluate aortic dissection      COMPARISON: None     DOSE LENGTH PRODUCT: 1118 mGy cm. Automated exposure control was also  utilized to decrease " patient radiation dose.     TECHNIQUE: Axial images of the chest are performed following IV  contrast. 2-D, 3-D, maximal intensity projectional reconstructed images  reveal     FINDINGS:  There is a thoracic aortic dissecting aneurysm involving the  distal thoracic aortic arch, originating just beyond the origin of the  left subclavian artery. The root of the ascending aorta measures 4.7 cm.  Ascending aorta at the level of the main pulmonary artery measuring 4.6  cm. The proximal aortic arch measures 2.9 cm. Common origin right  brachiocephalic and left common carotid arteries considered from the  arch of the aorta. No aneurysm or dissection extending into the great  vessels.     The distal aortic arch measures approximately 5.5 cm in greatest width  with notable intimal flap. The descending thoracic aorta at the level of  the left atrium measures approximately 5.2 cm. The aorta at the level of  the diaphragmatic hiatus measuring 4.7 cm. The true lumen is position  along the right aspect of the descending thoracic aorta. No evidence for  aneurysm leak. The there is cardiomegaly. No paracardial effusion. Trace  left pleural effusion. No pathologic intrathoracic or axillary  lymphadenopathy.     Please refer to CT abdomen and pelvis report for findings below the  hemidiaphragms.     Bibasilar atelectasis. No lobar consolidation or suspicious pulmonary  nodule. No pneumothorax. No endobronchial lesion.     Degenerative change of the thoracic spine. No focal destructive osseous  lesions.       Impression:      1. Thoracic aortic aneurysm with dissection. Dissection again within the  distal aortic arch beyond/distal to the origin of the left subclavian  artery. The ascending thoracic aorta without dissection measuring up to  4.7 cm. The distal arch of the aorta measuring up to 5.5 cm.  Cardiomegaly. No paracardial effusion.  2. Small left pleural effusion with basilar atelectasis.  This report was finalized on  01/31/2023 14:58 by Dr. Chantelle Wan MD.    CT Angiogram Abdomen Pelvis [[id number]] Collected: 01/31/23 1443     Updated: 01/31/23 1453    Narrative:      CT ANGIOGRAM ABDOMEN PELVIS- 1/31/2023 2:09 PM CST     HISTORY: evaluate aortic dissection      COMPARISON: None     DOSE LENGTH PRODUCT: 1118 mGy cm. Automated exposure control was also  utilized to decrease patient radiation dose.     TECHNIQUE: Axial images of the abdomen and pelvis are performed  following IV contrast. 2-D and maximal intensity projectional  reconstructed images are reviewed.     FINDINGS:  There is a thoracoabdominal aortic dissection beginning at  the arch of the thoracic aorta extending through the aortic bifurcation  involving the left common iliac artery. The aorta at the diaphragmatic  hiatus measures 4.5 cm. The celiac trunk, superior mesenteric, bilateral  renal arteries originate from the true lumen. Inferior mesenteric artery  appears to originate from the false lumen. The infrarenal abdominal  aorta is normal in caliber measuring 2.5 cm. No intimal flap is  identified within the internal/external iliac arteries.     There is cardiomegaly. Small left pleural effusion. Please refer to CT  chest report for findings above the hemidiaphragms.     No focal liver or splenic mass. No pancreatic cyst or mass. Gallbladder  unremarkable. No suspicious adrenal nodule. No enhancing renal mass. No  hydronephrosis. Mcfarland catheter within the decompressed bladder. Bladder  contour the uterus may indicate the presence of leiomyomas. No discrete  adnexal mass. No free intraperitoneal air or loculated abscess. No  evidence for bowel obstruction. Ventral abdominal wall hernia mesh in  place. Marked thinning of the abdominal wall musculature. Small hiatal  hernia. No pathologic lymphadenopathy.     Degenerative change of the regional skeleton bony hemangioma within the  L2 vertebra.       Impression:      1. Thoracoabdominal aortic dissection  extending from the thoracic aortic  arch through the aortic bifurcation into the left common iliac artery.  Aorta measuring 4.5 cm at the diaphragmatic hiatus. The celiac trunk,  superior mesenteric, bilateral renal arteries originate from the true  lumen. Inferior mesenteric artery originating from the false lumen. No  radiographic evidence of bowel ischemia.  2. Small hiatal hernia.  3. Previous ventral abdominal wall hernia repair with mesh in place.  4. Small left pleural effusion with cardiomegaly.  This report was finalized on 01/31/2023 14:50 by Dr. Chantelle Wan MD.            Physical Exam:  General: Alert, oriented. No apparent distress.  Obese  Cardiovascular: Regular rate and rhythm without murmur, rubs, or gallops.    Pulmonary: Clear to auscultation bilaterally without wheezing, rubs, or rales.  Abdomen: Soft, nondistended, and nontender.  Extremities: Warm, moves all extremities. No edema.   Neurologic:  Grossly intact with no focal deficits.            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  If no further bradycardia with beta-blocker dose this morning, will increase dosage  Discontinue Mcfarland catheter  Dr. Jefferson has discussed with Dr. Farrar as well as [employer] rep in regards to an appropriate time to proceed with TEVAR.  Decision making regarding timing is still in progress.  If unable to get better blood pressure control and wean down on Cardene, will discuss proceeding with TEVAR in the near future.  Discussed with patient and nursing.      ARTI Roger  02/01/23  08:46 CST

## 2023-02-02 ENCOUNTER — APPOINTMENT (OUTPATIENT)
Dept: INTERVENTIONAL RADIOLOGY/VASCULAR | Facility: HOSPITAL | Age: 58
DRG: 221 | End: 2023-02-02
Payer: MEDICAID

## 2023-02-02 ENCOUNTER — ANESTHESIA (OUTPATIENT)
Dept: PERIOP | Facility: HOSPITAL | Age: 58
DRG: 221 | End: 2023-02-02
Payer: MEDICAID

## 2023-02-02 ENCOUNTER — APPOINTMENT (OUTPATIENT)
Dept: GENERAL RADIOLOGY | Facility: HOSPITAL | Age: 58
DRG: 221 | End: 2023-02-02
Payer: MEDICAID

## 2023-02-02 LAB
ANION GAP SERPL CALCULATED.3IONS-SCNC: 6 MMOL/L (ref 5–15)
ANION GAP SERPL CALCULATED.3IONS-SCNC: 9 MMOL/L (ref 5–15)
BASOPHILS # BLD AUTO: 0.03 10*3/MM3 (ref 0–0.2)
BASOPHILS NFR BLD AUTO: 0.3 % (ref 0–1.5)
BUN SERPL-MCNC: 10 MG/DL (ref 6–20)
BUN SERPL-MCNC: 10 MG/DL (ref 6–20)
BUN/CREAT SERPL: 22.7 (ref 7–25)
BUN/CREAT SERPL: 40 (ref 7–25)
CALCIUM SPEC-SCNC: 7.7 MG/DL (ref 8.6–10.5)
CALCIUM SPEC-SCNC: 8.3 MG/DL (ref 8.6–10.5)
CHLORIDE SERPL-SCNC: 103 MMOL/L (ref 98–107)
CHLORIDE SERPL-SCNC: 106 MMOL/L (ref 98–107)
CO2 SERPL-SCNC: 24 MMOL/L (ref 22–29)
CO2 SERPL-SCNC: 26 MMOL/L (ref 22–29)
CREAT SERPL-MCNC: 0.25 MG/DL (ref 0.57–1)
CREAT SERPL-MCNC: 0.44 MG/DL (ref 0.57–1)
DEPRECATED RDW RBC AUTO: 50.2 FL (ref 37–54)
DEPRECATED RDW RBC AUTO: 51.4 FL (ref 37–54)
EGFRCR SERPLBLD CKD-EPI 2021: 113 ML/MIN/1.73
EGFRCR SERPLBLD CKD-EPI 2021: 129.5 ML/MIN/1.73
EOSINOPHIL # BLD AUTO: 0.21 10*3/MM3 (ref 0–0.4)
EOSINOPHIL NFR BLD AUTO: 2 % (ref 0.3–6.2)
ERYTHROCYTE [DISTWIDTH] IN BLOOD BY AUTOMATED COUNT: 14.3 % (ref 12.3–15.4)
ERYTHROCYTE [DISTWIDTH] IN BLOOD BY AUTOMATED COUNT: 14.4 % (ref 12.3–15.4)
GLUCOSE BLDC GLUCOMTR-MCNC: 157 MG/DL (ref 70–130)
GLUCOSE SERPL-MCNC: 139 MG/DL (ref 65–99)
GLUCOSE SERPL-MCNC: 143 MG/DL (ref 65–99)
HCT VFR BLD AUTO: 35.8 % (ref 34–46.6)
HCT VFR BLD AUTO: 39.1 % (ref 34–46.6)
HGB BLD-MCNC: 11.2 G/DL (ref 12–15.9)
HGB BLD-MCNC: 12.5 G/DL (ref 12–15.9)
IMM GRANULOCYTES # BLD AUTO: 0.04 10*3/MM3 (ref 0–0.05)
IMM GRANULOCYTES NFR BLD AUTO: 0.4 % (ref 0–0.5)
LYMPHOCYTES # BLD AUTO: 0.95 10*3/MM3 (ref 0.7–3.1)
LYMPHOCYTES NFR BLD AUTO: 8.8 % (ref 19.6–45.3)
MAGNESIUM SERPL-MCNC: 1.9 MG/DL (ref 1.6–2.6)
MCH RBC QN AUTO: 30.3 PG (ref 26.6–33)
MCH RBC QN AUTO: 30.3 PG (ref 26.6–33)
MCHC RBC AUTO-ENTMCNC: 31.3 G/DL (ref 31.5–35.7)
MCHC RBC AUTO-ENTMCNC: 32 G/DL (ref 31.5–35.7)
MCV RBC AUTO: 94.9 FL (ref 79–97)
MCV RBC AUTO: 96.8 FL (ref 79–97)
MONOCYTES # BLD AUTO: 1.45 10*3/MM3 (ref 0.1–0.9)
MONOCYTES NFR BLD AUTO: 13.5 % (ref 5–12)
NEUTROPHILS NFR BLD AUTO: 75 % (ref 42.7–76)
NEUTROPHILS NFR BLD AUTO: 8.08 10*3/MM3 (ref 1.7–7)
NRBC BLD AUTO-RTO: 0 /100 WBC (ref 0–0.2)
PLATELET # BLD AUTO: 165 10*3/MM3 (ref 140–450)
PLATELET # BLD AUTO: 181 10*3/MM3 (ref 140–450)
PMV BLD AUTO: 10.9 FL (ref 6–12)
PMV BLD AUTO: 11.5 FL (ref 6–12)
POTASSIUM SERPL-SCNC: 4 MMOL/L (ref 3.5–5.2)
POTASSIUM SERPL-SCNC: 4.3 MMOL/L (ref 3.5–5.2)
RBC # BLD AUTO: 3.7 10*6/MM3 (ref 3.77–5.28)
RBC # BLD AUTO: 4.12 10*6/MM3 (ref 3.77–5.28)
SODIUM SERPL-SCNC: 136 MMOL/L (ref 136–145)
SODIUM SERPL-SCNC: 138 MMOL/L (ref 136–145)
WBC NRBC COR # BLD: 10.76 10*3/MM3 (ref 3.4–10.8)
WBC NRBC COR # BLD: 10.86 10*3/MM3 (ref 3.4–10.8)

## 2023-02-02 PROCEDURE — 25010000002 FENTANYL CITRATE (PF) 100 MCG/2ML SOLUTION: Performed by: NURSE ANESTHETIST, CERTIFIED REGISTERED

## 2023-02-02 PROCEDURE — C1894 INTRO/SHEATH, NON-LASER: HCPCS | Performed by: SURGERY

## 2023-02-02 PROCEDURE — 80048 BASIC METABOLIC PNL TOTAL CA: CPT | Performed by: NURSE PRACTITIONER

## 2023-02-02 PROCEDURE — C1769 GUIDE WIRE: HCPCS | Performed by: SURGERY

## 2023-02-02 PROCEDURE — 25010000002 PHENYLEPHRINE 10 MG/ML SOLUTION: Performed by: NURSE ANESTHETIST, CERTIFIED REGISTERED

## 2023-02-02 PROCEDURE — 25010000002 HEPARIN (PORCINE) PER 1000 UNITS: Performed by: SURGERY

## 2023-02-02 PROCEDURE — B41B1ZZ FLUOROSCOPY OF OTHER INTRA-ABDOMINAL ARTERIES USING LOW OSMOLAR CONTRAST: ICD-10-PCS | Performed by: SURGERY

## 2023-02-02 PROCEDURE — 85027 COMPLETE CBC AUTOMATED: CPT | Performed by: SURGERY

## 2023-02-02 PROCEDURE — 25010000002 HEPARIN (PORCINE) PER 1000 UNITS: Performed by: NURSE ANESTHETIST, CERTIFIED REGISTERED

## 2023-02-02 PROCEDURE — C1729 CATH, DRAINAGE: HCPCS | Performed by: SURGERY

## 2023-02-02 PROCEDURE — 37236 OPEN/PERQ PLACE STENT 1ST: CPT | Performed by: SURGERY

## 2023-02-02 PROCEDURE — 82962 GLUCOSE BLOOD TEST: CPT

## 2023-02-02 PROCEDURE — 37252 INTRVASC US NONCORONARY 1ST: CPT | Performed by: SURGERY

## 2023-02-02 PROCEDURE — 33881 EVASC RPR TA NDGFT XCOV LSA: CPT | Performed by: SURGERY

## 2023-02-02 PROCEDURE — 85025 COMPLETE CBC W/AUTO DIFF WBC: CPT | Performed by: NURSE PRACTITIONER

## 2023-02-02 PROCEDURE — 02VW3DZ RESTRICTION OF THORACIC AORTA, DESCENDING WITH INTRALUMINAL DEVICE, PERCUTANEOUS APPROACH: ICD-10-PCS | Performed by: SURGERY

## 2023-02-02 PROCEDURE — C1874 STENT, COATED/COV W/DEL SYS: HCPCS | Performed by: SURGERY

## 2023-02-02 PROCEDURE — 80048 BASIC METABOLIC PNL TOTAL CA: CPT | Performed by: SURGERY

## 2023-02-02 PROCEDURE — 25010000002 IOPAMIDOL 61 % SOLUTION: Performed by: SURGERY

## 2023-02-02 PROCEDURE — 34713 PERQ ACCESS & CLSR FEM ART: CPT | Performed by: SURGERY

## 2023-02-02 PROCEDURE — 25010000002 PROPOFOL 10 MG/ML EMULSION: Performed by: NURSE ANESTHETIST, CERTIFIED REGISTERED

## 2023-02-02 PROCEDURE — C1753 CATH, INTRAVAS ULTRASOUND: HCPCS | Performed by: SURGERY

## 2023-02-02 PROCEDURE — 25010000002 CEFAZOLIN PER 500 MG: Performed by: NURSE PRACTITIONER

## 2023-02-02 PROCEDURE — 009U30Z DRAINAGE OF SPINAL CANAL WITH DRAINAGE DEVICE, PERCUTANEOUS APPROACH: ICD-10-PCS | Performed by: NEUROLOGICAL SURGERY

## 2023-02-02 PROCEDURE — 83735 ASSAY OF MAGNESIUM: CPT | Performed by: SURGERY

## 2023-02-02 PROCEDURE — 25010000002 CEFAZOLIN PER 500 MG: Performed by: SURGERY

## 2023-02-02 PROCEDURE — 76000 FLUOROSCOPY <1 HR PHYS/QHP: CPT

## 2023-02-02 PROCEDURE — 99024 POSTOP FOLLOW-UP VISIT: CPT | Performed by: SURGERY

## 2023-02-02 PROCEDURE — 02VX3EZ RESTRICTION OF THORACIC AORTA, ASCENDING/ARCH WITH BRANCHED OR FENESTRATED INTRALUMINAL DEVICE, ONE OR TWO ARTERIES, PERCUTANEOUS APPROACH: ICD-10-PCS | Performed by: SURGERY

## 2023-02-02 PROCEDURE — 71045 X-RAY EXAM CHEST 1 VIEW: CPT

## 2023-02-02 PROCEDURE — C1725 CATH, TRANSLUMIN NON-LASER: HCPCS | Performed by: SURGERY

## 2023-02-02 PROCEDURE — C1885 CATH, TRANSLUMIN ANGIO LASER: HCPCS | Performed by: SURGERY

## 2023-02-02 PROCEDURE — C1889 IMPLANT/INSERT DEVICE, NOC: HCPCS | Performed by: SURGERY

## 2023-02-02 PROCEDURE — 25010000002 FENTANYL CITRATE (PF) 250 MCG/5ML SOLUTION: Performed by: NURSE ANESTHETIST, CERTIFIED REGISTERED

## 2023-02-02 PROCEDURE — 25010000002 DROPERIDOL PER 5 MG: Performed by: ANESTHESIOLOGY

## 2023-02-02 PROCEDURE — 25010000002 ONDANSETRON PER 1 MG: Performed by: NURSE ANESTHETIST, CERTIFIED REGISTERED

## 2023-02-02 PROCEDURE — C1760 CLOSURE DEV, VASC: HCPCS | Performed by: SURGERY

## 2023-02-02 PROCEDURE — 04713DZ DILATION OF CELIAC ARTERY WITH INTRALUMINAL DEVICE, PERCUTANEOUS APPROACH: ICD-10-PCS | Performed by: SURGERY

## 2023-02-02 PROCEDURE — 62272 THER SPI PNXR DRG CSF: CPT | Performed by: NEUROLOGICAL SURGERY

## 2023-02-02 PROCEDURE — 75956: CPT

## 2023-02-02 PROCEDURE — B24BZZ3 ULTRASONOGRAPHY OF HEART WITH AORTA, INTRAVASCULAR: ICD-10-PCS | Performed by: SURGERY

## 2023-02-02 PROCEDURE — B31J1ZZ FLUOROSCOPY OF LEFT UPPER EXTREMITY ARTERIES USING LOW OSMOLAR CONTRAST: ICD-10-PCS | Performed by: SURGERY

## 2023-02-02 PROCEDURE — C1760 CLOSURE DEV, VASC: HCPCS

## 2023-02-02 DEVICE — IMPLANTABLE DEVICE: Type: IMPLANTABLE DEVICE | Site: AORTA | Status: FUNCTIONAL

## 2023-02-02 RX ORDER — OXYCODONE AND ACETAMINOPHEN 10; 325 MG/1; MG/1
1 TABLET ORAL ONCE AS NEEDED
Status: DISCONTINUED | OUTPATIENT
Start: 2023-02-02 | End: 2023-02-02 | Stop reason: HOSPADM

## 2023-02-02 RX ORDER — FENTANYL CITRATE 50 UG/ML
25 INJECTION, SOLUTION INTRAMUSCULAR; INTRAVENOUS
Status: DISCONTINUED | OUTPATIENT
Start: 2023-02-02 | End: 2023-02-02 | Stop reason: HOSPADM

## 2023-02-02 RX ORDER — HYDROMORPHONE HYDROCHLORIDE 1 MG/ML
0.5 INJECTION, SOLUTION INTRAMUSCULAR; INTRAVENOUS; SUBCUTANEOUS
Status: DISCONTINUED | OUTPATIENT
Start: 2023-02-02 | End: 2023-02-02 | Stop reason: HOSPADM

## 2023-02-02 RX ORDER — ACETAMINOPHEN 325 MG/1
650 TABLET ORAL EVERY 6 HOURS
Status: DISCONTINUED | OUTPATIENT
Start: 2023-02-02 | End: 2023-02-07 | Stop reason: HOSPADM

## 2023-02-02 RX ORDER — CARVEDILOL 3.12 MG/1
3.12 TABLET ORAL 2 TIMES DAILY WITH MEALS
Status: DISCONTINUED | OUTPATIENT
Start: 2023-02-02 | End: 2023-02-03

## 2023-02-02 RX ORDER — LIDOCAINE HYDROCHLORIDE 10 MG/ML
0.5 INJECTION, SOLUTION EPIDURAL; INFILTRATION; INTRACAUDAL; PERINEURAL ONCE AS NEEDED
Status: DISCONTINUED | OUTPATIENT
Start: 2023-02-02 | End: 2023-02-02 | Stop reason: HOSPADM

## 2023-02-02 RX ORDER — FENTANYL CITRATE 50 UG/ML
INJECTION, SOLUTION INTRAMUSCULAR; INTRAVENOUS AS NEEDED
Status: DISCONTINUED | OUTPATIENT
Start: 2023-02-02 | End: 2023-02-02 | Stop reason: SURG

## 2023-02-02 RX ORDER — MAGNESIUM HYDROXIDE 1200 MG/15ML
LIQUID ORAL AS NEEDED
Status: DISCONTINUED | OUTPATIENT
Start: 2023-02-02 | End: 2023-02-02 | Stop reason: HOSPADM

## 2023-02-02 RX ORDER — HEPARIN SODIUM 1000 [USP'U]/ML
INJECTION, SOLUTION INTRAVENOUS; SUBCUTANEOUS AS NEEDED
Status: DISCONTINUED | OUTPATIENT
Start: 2023-02-02 | End: 2023-02-02 | Stop reason: SURG

## 2023-02-02 RX ORDER — LIDOCAINE HYDROCHLORIDE 20 MG/ML
INJECTION, SOLUTION EPIDURAL; INFILTRATION; INTRACAUDAL; PERINEURAL AS NEEDED
Status: DISCONTINUED | OUTPATIENT
Start: 2023-02-02 | End: 2023-02-02 | Stop reason: SURG

## 2023-02-02 RX ORDER — ONDANSETRON 2 MG/ML
INJECTION INTRAMUSCULAR; INTRAVENOUS AS NEEDED
Status: DISCONTINUED | OUTPATIENT
Start: 2023-02-02 | End: 2023-02-02 | Stop reason: SURG

## 2023-02-02 RX ORDER — BUPIVACAINE HCL/0.9 % NACL/PF 0.1 %
2 PLASTIC BAG, INJECTION (ML) EPIDURAL EVERY 8 HOURS
Status: COMPLETED | OUTPATIENT
Start: 2023-02-02 | End: 2023-02-03

## 2023-02-02 RX ORDER — DROPERIDOL 2.5 MG/ML
0.62 INJECTION, SOLUTION INTRAMUSCULAR; INTRAVENOUS ONCE AS NEEDED
Status: COMPLETED | OUTPATIENT
Start: 2023-02-02 | End: 2023-02-02

## 2023-02-02 RX ORDER — NOREPINEPHRINE BIT/0.9 % NACL 8 MG/250ML
.02-.3 INFUSION BOTTLE (ML) INTRAVENOUS
Status: DISCONTINUED | OUTPATIENT
Start: 2023-02-02 | End: 2023-02-05

## 2023-02-02 RX ORDER — ROCURONIUM BROMIDE 10 MG/ML
INJECTION, SOLUTION INTRAVENOUS AS NEEDED
Status: DISCONTINUED | OUTPATIENT
Start: 2023-02-02 | End: 2023-02-02 | Stop reason: SURG

## 2023-02-02 RX ORDER — SODIUM CHLORIDE, SODIUM LACTATE, POTASSIUM CHLORIDE, CALCIUM CHLORIDE 600; 310; 30; 20 MG/100ML; MG/100ML; MG/100ML; MG/100ML
9 INJECTION, SOLUTION INTRAVENOUS CONTINUOUS
Status: DISCONTINUED | OUTPATIENT
Start: 2023-02-02 | End: 2023-02-05

## 2023-02-02 RX ORDER — ASPIRIN 81 MG/1
81 TABLET ORAL DAILY
Status: DISCONTINUED | OUTPATIENT
Start: 2023-02-02 | End: 2023-02-07

## 2023-02-02 RX ORDER — FLUMAZENIL 0.1 MG/ML
0.2 INJECTION INTRAVENOUS AS NEEDED
Status: DISCONTINUED | OUTPATIENT
Start: 2023-02-02 | End: 2023-02-02 | Stop reason: HOSPADM

## 2023-02-02 RX ORDER — CARVEDILOL 6.25 MG/1
6.25 TABLET ORAL ONCE
Status: COMPLETED | OUTPATIENT
Start: 2023-02-02 | End: 2023-02-02

## 2023-02-02 RX ORDER — IBUPROFEN 600 MG/1
600 TABLET ORAL ONCE AS NEEDED
Status: DISCONTINUED | OUTPATIENT
Start: 2023-02-02 | End: 2023-02-02 | Stop reason: HOSPADM

## 2023-02-02 RX ORDER — MIDAZOLAM HYDROCHLORIDE 1 MG/ML
1 INJECTION INTRAMUSCULAR; INTRAVENOUS
Status: DISCONTINUED | OUTPATIENT
Start: 2023-02-02 | End: 2023-02-02 | Stop reason: HOSPADM

## 2023-02-02 RX ORDER — CLOPIDOGREL BISULFATE 75 MG/1
75 TABLET ORAL DAILY
Status: DISCONTINUED | OUTPATIENT
Start: 2023-02-02 | End: 2023-02-07 | Stop reason: HOSPADM

## 2023-02-02 RX ORDER — ESMOLOL HYDROCHLORIDE 10 MG/ML
25-300 INJECTION INTRAVENOUS
Status: DISCONTINUED | OUTPATIENT
Start: 2023-02-02 | End: 2023-02-05

## 2023-02-02 RX ORDER — SODIUM CHLORIDE 9 MG/ML
1 INJECTION, SOLUTION INTRAVENOUS CONTINUOUS
Status: DISPENSED | OUTPATIENT
Start: 2023-02-02 | End: 2023-02-02

## 2023-02-02 RX ORDER — EPHEDRINE SULFATE 50 MG/ML
INJECTION, SOLUTION INTRAVENOUS AS NEEDED
Status: DISCONTINUED | OUTPATIENT
Start: 2023-02-02 | End: 2023-02-02 | Stop reason: SURG

## 2023-02-02 RX ORDER — SODIUM CHLORIDE 0.9 % (FLUSH) 0.9 %
10 SYRINGE (ML) INJECTION EVERY 12 HOURS SCHEDULED
Status: DISCONTINUED | OUTPATIENT
Start: 2023-02-02 | End: 2023-02-02 | Stop reason: HOSPADM

## 2023-02-02 RX ORDER — NALOXONE HCL 0.4 MG/ML
0.04 VIAL (ML) INJECTION AS NEEDED
Status: DISCONTINUED | OUTPATIENT
Start: 2023-02-02 | End: 2023-02-02 | Stop reason: HOSPADM

## 2023-02-02 RX ORDER — PROPOFOL 10 MG/ML
VIAL (ML) INTRAVENOUS AS NEEDED
Status: DISCONTINUED | OUTPATIENT
Start: 2023-02-02 | End: 2023-02-02 | Stop reason: SURG

## 2023-02-02 RX ORDER — DEXTROSE MONOHYDRATE 25 G/50ML
12.5 INJECTION, SOLUTION INTRAVENOUS AS NEEDED
Status: DISCONTINUED | OUTPATIENT
Start: 2023-02-02 | End: 2023-02-02 | Stop reason: HOSPADM

## 2023-02-02 RX ORDER — ONDANSETRON 2 MG/ML
4 INJECTION INTRAMUSCULAR; INTRAVENOUS
Status: DISCONTINUED | OUTPATIENT
Start: 2023-02-02 | End: 2023-02-02 | Stop reason: HOSPADM

## 2023-02-02 RX ORDER — SODIUM CHLORIDE 0.9 % (FLUSH) 0.9 %
10 SYRINGE (ML) INJECTION AS NEEDED
Status: DISCONTINUED | OUTPATIENT
Start: 2023-02-02 | End: 2023-02-02 | Stop reason: HOSPADM

## 2023-02-02 RX ORDER — ASPIRIN 81 MG/1
81 TABLET ORAL DAILY
Status: DISCONTINUED | OUTPATIENT
Start: 2023-02-03 | End: 2023-02-02

## 2023-02-02 RX ORDER — ENOXAPARIN SODIUM 100 MG/ML
40 INJECTION SUBCUTANEOUS DAILY
Status: DISCONTINUED | OUTPATIENT
Start: 2023-02-03 | End: 2023-02-07

## 2023-02-02 RX ORDER — PHENYLEPHRINE HYDROCHLORIDE 10 MG/ML
INJECTION INTRAVENOUS AS NEEDED
Status: DISCONTINUED | OUTPATIENT
Start: 2023-02-02 | End: 2023-02-02 | Stop reason: SURG

## 2023-02-02 RX ORDER — TRAMADOL HYDROCHLORIDE 50 MG/1
50 TABLET ORAL EVERY 6 HOURS PRN
Status: DISCONTINUED | OUTPATIENT
Start: 2023-02-02 | End: 2023-02-07 | Stop reason: HOSPADM

## 2023-02-02 RX ORDER — LABETALOL HYDROCHLORIDE 5 MG/ML
5 INJECTION, SOLUTION INTRAVENOUS
Status: DISCONTINUED | OUTPATIENT
Start: 2023-02-02 | End: 2023-02-02 | Stop reason: HOSPADM

## 2023-02-02 RX ORDER — SODIUM CHLORIDE 9 MG/ML
INJECTION, SOLUTION INTRAVENOUS CONTINUOUS PRN
Status: DISCONTINUED | OUTPATIENT
Start: 2023-02-02 | End: 2023-02-02 | Stop reason: SURG

## 2023-02-02 RX ADMIN — TRAMADOL HYDROCHLORIDE 50 MG: 50 TABLET, COATED ORAL at 20:07

## 2023-02-02 RX ADMIN — PHENYLEPHRINE HYDROCHLORIDE 100 MCG: 10 INJECTION INTRAVENOUS at 11:58

## 2023-02-02 RX ADMIN — HYDROCODONE BITARTRATE AND ACETAMINOPHEN 1 TABLET: 5; 325 TABLET ORAL at 19:24

## 2023-02-02 RX ADMIN — NICARDIPINE HYDROCHLORIDE 15 MG/HR: 2.5 INJECTION, SOLUTION INTRAVENOUS at 04:52

## 2023-02-02 RX ADMIN — NICARDIPINE HYDROCHLORIDE 15 MG/HR: 2.5 INJECTION, SOLUTION INTRAVENOUS at 06:22

## 2023-02-02 RX ADMIN — NOREPINEPHRINE BITARTRATE 0.02 MCG/KG/MIN: 1 INJECTION, SOLUTION, CONCENTRATE INTRAVENOUS at 13:45

## 2023-02-02 RX ADMIN — NICARDIPINE HYDROCHLORIDE 7.5 MG/HR: 2.5 INJECTION, SOLUTION INTRAVENOUS at 22:32

## 2023-02-02 RX ADMIN — CLOPIDOGREL 75 MG: 75 TABLET, FILM COATED ORAL at 17:27

## 2023-02-02 RX ADMIN — Medication 1 APPLICATION: at 08:04

## 2023-02-02 RX ADMIN — CARVEDILOL 6.25 MG: 6.25 TABLET, FILM COATED ORAL at 08:02

## 2023-02-02 RX ADMIN — HEPARIN SODIUM 1000 UNITS: 1000 INJECTION INTRAVENOUS; SUBCUTANEOUS at 13:43

## 2023-02-02 RX ADMIN — LIDOCAINE HYDROCHLORIDE 80 MG: 20 INJECTION, SOLUTION EPIDURAL; INFILTRATION; INTRACAUDAL; PERINEURAL at 11:37

## 2023-02-02 RX ADMIN — ROCURONIUM BROMIDE 50 MG: 10 INJECTION INTRAVENOUS at 11:37

## 2023-02-02 RX ADMIN — ACETAMINOPHEN 650 MG: 325 TABLET, FILM COATED ORAL at 22:44

## 2023-02-02 RX ADMIN — CARVEDILOL 3.12 MG: 6.25 TABLET, FILM COATED ORAL at 17:26

## 2023-02-02 RX ADMIN — SODIUM CHLORIDE, POTASSIUM CHLORIDE, SODIUM LACTATE AND CALCIUM CHLORIDE 9 ML/HR: 600; 310; 30; 20 INJECTION, SOLUTION INTRAVENOUS at 11:10

## 2023-02-02 RX ADMIN — PROPOFOL INJECTABLE EMULSION 120 MG: 10 INJECTION, EMULSION INTRAVENOUS at 11:37

## 2023-02-02 RX ADMIN — LOSARTAN POTASSIUM 25 MG: 50 TABLET, FILM COATED ORAL at 17:25

## 2023-02-02 RX ADMIN — NICARDIPINE HYDROCHLORIDE 15 MG/HR: 2.5 INJECTION, SOLUTION INTRAVENOUS at 02:50

## 2023-02-02 RX ADMIN — HEPARIN SODIUM 1000 UNITS: 1000 INJECTION INTRAVENOUS; SUBCUTANEOUS at 12:41

## 2023-02-02 RX ADMIN — CEFAZOLIN 2 G: 2 INJECTION, POWDER, FOR SOLUTION INTRAMUSCULAR; INTRAVENOUS at 20:30

## 2023-02-02 RX ADMIN — NOREPINEPHRINE BITARTRATE 0.02 MCG/KG/MIN: 1 INJECTION, SOLUTION, CONCENTRATE INTRAVENOUS at 15:42

## 2023-02-02 RX ADMIN — ASPIRIN 81 MG: 81 TABLET, COATED ORAL at 17:23

## 2023-02-02 RX ADMIN — ROCURONIUM BROMIDE 50 MG: 10 INJECTION INTRAVENOUS at 14:37

## 2023-02-02 RX ADMIN — ATORVASTATIN CALCIUM 10 MG: 10 TABLET, FILM COATED ORAL at 17:24

## 2023-02-02 RX ADMIN — HEPARIN SODIUM 1000 UNITS: 1000 INJECTION INTRAVENOUS; SUBCUTANEOUS at 14:13

## 2023-02-02 RX ADMIN — CARVEDILOL 6.25 MG: 6.25 TABLET, FILM COATED ORAL at 20:29

## 2023-02-02 RX ADMIN — NICARDIPINE HYDROCHLORIDE 7.5 MG/HR: 2.5 INJECTION, SOLUTION INTRAVENOUS at 20:08

## 2023-02-02 RX ADMIN — Medication 1 APPLICATION: at 20:09

## 2023-02-02 RX ADMIN — ESMOLOL HYDROCHLORIDE 25 MCG/KG/MIN: 10 INJECTION INTRAVENOUS at 19:20

## 2023-02-02 RX ADMIN — FLUOXETINE HYDROCHLORIDE 40 MG: 20 CAPSULE ORAL at 17:24

## 2023-02-02 RX ADMIN — SODIUM CHLORIDE: 9 INJECTION, SOLUTION INTRAVENOUS at 11:35

## 2023-02-02 RX ADMIN — PHENYLEPHRINE HYDROCHLORIDE 100 MCG: 10 INJECTION INTRAVENOUS at 11:41

## 2023-02-02 RX ADMIN — SUGAMMADEX 200 MG: 100 INJECTION, SOLUTION INTRAVENOUS at 15:10

## 2023-02-02 RX ADMIN — Medication 10 ML: at 20:10

## 2023-02-02 RX ADMIN — FENTANYL CITRATE 100 MCG: 0.05 INJECTION, SOLUTION INTRAMUSCULAR; INTRAVENOUS at 11:37

## 2023-02-02 RX ADMIN — GLYCOPYRROLATE 0.2 MG: 0.2 INJECTION INTRAMUSCULAR; INTRAVENOUS at 13:54

## 2023-02-02 RX ADMIN — SODIUM CHLORIDE 1 ML/KG/HR: 9 INJECTION, SOLUTION INTRAVENOUS at 16:46

## 2023-02-02 RX ADMIN — ONDANSETRON 4 MG: 2 INJECTION INTRAMUSCULAR; INTRAVENOUS at 15:23

## 2023-02-02 RX ADMIN — PHENYTOIN SODIUM 500 MG: 100 CAPSULE ORAL at 20:31

## 2023-02-02 RX ADMIN — ESMOLOL HYDROCHLORIDE 25 MCG/KG/MIN: 10 INJECTION INTRAVENOUS at 04:04

## 2023-02-02 RX ADMIN — NOREPINEPHRINE BITARTRATE 0.04 MCG/KG/MIN: 1 INJECTION, SOLUTION, CONCENTRATE INTRAVENOUS at 15:27

## 2023-02-02 RX ADMIN — ACETAMINOPHEN 650 MG: 325 TABLET, FILM COATED ORAL at 17:23

## 2023-02-02 RX ADMIN — HEPARIN SODIUM 5000 UNITS: 1000 INJECTION INTRAVENOUS; SUBCUTANEOUS at 13:00

## 2023-02-02 RX ADMIN — FENTANYL CITRATE 150 MCG: 0.05 INJECTION, SOLUTION INTRAMUSCULAR; INTRAVENOUS at 12:32

## 2023-02-02 RX ADMIN — FENTANYL CITRATE 100 MCG: 50 INJECTION, SOLUTION INTRAMUSCULAR; INTRAVENOUS at 14:51

## 2023-02-02 RX ADMIN — DROPERIDOL 0.62 MG: 2.5 INJECTION, SOLUTION INTRAMUSCULAR; INTRAVENOUS at 15:51

## 2023-02-02 RX ADMIN — ROCURONIUM BROMIDE 100 MG: 10 INJECTION INTRAVENOUS at 12:32

## 2023-02-02 RX ADMIN — PANTOPRAZOLE SODIUM 40 MG: 40 INJECTION, POWDER, FOR SOLUTION INTRAVENOUS at 06:04

## 2023-02-02 RX ADMIN — EPHEDRINE SULFATE 25 MG: 50 INJECTION INTRAVENOUS at 13:46

## 2023-02-02 NOTE — OP NOTE
NEUROSURGERY OPERATIVE NOTE    [facility]  OR Date: 2/2/2023    Pre-op Diagnosis:   Dissection of descending thoracic aorta [I71.012]    Post-op Diagnosis:     Post-Op Diagnosis Codes:     * Dissection of descending thoracic aorta [I71.012]          Surgeon(s):  Jonnathan Farrar, Jonnathan Lopez DO Ward, Austin N, MD Titsworth, William Lee, MD    Anesthesia: General    Staff:   Circulator: Francesca Barlow RN; Susan Caro RN  Scrub Person: Mariya Olivares; Faheem Ortiz; Nuris Ely    Procedure(s):  LUMBAR DRAIN INSERTION EXTERNAL    -----------------------------  PREOPERATIVE DIAGNOSIS: Aota dissection.  POSTOPERATIVE DIAGNOSIS: Same  PROCEDURE PERFORMED: Placement of lumbar drain for CSF drainage.   SURGEON: Elie Cerda    INDICATIONS FOR PROCEDURE: The patient is a 57 y.o. female who was admitted following for aortic dissection.  Neurosurgery was consulted for placement of lumbar drain prior to placement of endovascular graft over artery of Adamkiewicz.    The risks and benefits of the procedure were discussed. The patient accepted and understood all potential risks and complications including infection, meningitis, inability to place the catheter, epidural hematoma, catheter malfunction amongst others. After considering options, the patient requested that we proceed with the procedure.    DESCRIPTION OF PROCEDURE: Timeout was performed.  The patient was placed in the left lateral position and the lumbar region was prepped and draped in the routine manner. The region was thoroughly infiltrated with lidocaine with epinephrine 1-100,000.  IV antibiotics were given.  A Tuohy needle was inserted into the L4-5 interspace.  The thecal sac was entered on the first attempt.  The bed was placed in reverse Trendelenburg and had brisk flow of CSF. The catheter was inserted through the Tuohy needle without difficulty.  Only 15 cm of the catheter could be easily advanced.  Excellent  outflow of CSF was obtained so the needle was removed. The catheter was secured. The system was connected to a drainage system for continued drainage. The patient tolerated the procedure well and no complications were noted.   She remained intubated at the end of the procedure.      Estimated Blood Loss: minimal    Complications: None    Implants: * No implants in log *    Specimens:                None      Drains:   [REMOVED] Urethral Catheter (Removed)   Daily Indications Acute Urinary Retention 02/01/23 0800   Site Assessment Clean;Skin intact 02/01/23 0800   Collection Container Standard drainage bag 02/01/23 0800   Securement Method Securing device 02/01/23 0800   Catheter care complete Yes 02/01/23 0800   Output (mL) 200 mL 02/01/23 0815

## 2023-02-02 NOTE — PROGRESS NOTES
"Patient name: Shilpa Zhang  Patient : 1965  VISIT # [id number]  MR #[id number]    Procedure:  Procedure Date:  POD:* No surgery date entered *    Subjective   Chief complaint: Back pain    Patient is resting in chair in the ICU tolerating 2 liters nasal cannula.  She complains of nausea this morning.  Hypertensive overnight requiring Cardene as well as esmolol.  Anticipating TEVAR this afternoon    Telemetry: Sinus rhythm 60s   IV drips: Cardene    ROS: No fevers or chills.  No shortness of breath.  Improving back pain.  No chest pain.  Complains of nausea       Objective     Visit Vitals  /60   Pulse 77   Temp 98.5 °F (36.9 °C) (Axillary)   Resp 18   Ht 167.6 cm (66\")   Wt 88.7 kg (195 lb 8.8 oz)   SpO2 96%   BMI 31.56 kg/m²       Intake/Output Summary (Last 24 hours) at 2023 0845  Last data filed at 2023 0409  Gross per 24 hour   Intake 723.12 ml   Output 400 ml   Net 323.12 ml       Lab:     CBC:  Results from last 7 days   Lab Units 23  0641 23  0503 23  1406   WBC 10*3/mm3 10.76 9.77 14.44*   HEMATOCRIT % 39.1 39.4 41.7   PLATELETS 10*3/mm3 181 174 205          BMP:  Results from last 7 days   Lab Units 23  0641 23  0503 23  1406   SODIUM mmol/L 138 140 143   POTASSIUM mmol/L 4.0 4.0 3.9   CHLORIDE mmol/L 103 104 105   CO2 mmol/L 26.0 27.0 27.0   GLUCOSE mg/dL 143* 100* 125*   BUN mg/dL 10 21* 17   CREATININE mg/dL 0.25* 0.35* 0.36*          COAG:  Results from last 7 days   Lab Units 23  1406   INR  1.38*   APTT seconds 37.0*       IMAGES:       Imaging Results (Last 24 Hours)     ** No results found for the last 24 hours. **            Physical Exam:  General: Alert, oriented. No apparent distress.  Obese.  On 2 L nasal cannula   Cardiovascular: Regular rate and rhythm without murmur, rubs, or gallops.    Pulmonary: Clear to auscultation bilaterally without wheezing, rubs, or rales.  Abdomen: Soft, nondistended, and nontender.  Extremities: " Warm, moves all extremities. No edema.   Neurologic:  Grossly intact with no focal deficits.            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  Wean Cardene and esmolol as tolerated  Remain NPO  To OR for TEVAR this afternoon by Dr. Jefferson and Dr. Farrar  Discussed with patient and nursing.      Jayde Alvarez, APRN  02/02/23  08:45 CST

## 2023-02-02 NOTE — ANESTHESIA PROCEDURE NOTES
Arterial Line      Patient location during procedure: holding area  Start time: 2/2/2023 10:57 AM  Stop Time:2/2/2023 11:01 AM       Line placed for hemodynamic monitoring.  Performed By   Anesthesiologist: Camilo Kulkarni MD   Preanesthetic Checklist  Completed: patient identified, IV checked, site marked, risks and benefits discussed, surgical consent, monitors and equipment checked, pre-op evaluation and timeout performed  Arterial Line Prep    Sterile Tech: mask, cap and gloves  Prep: ChloraPrep  Patient monitoring: continuous pulse oximetry  Arterial Line Procedure   Laterality:right  Location:  radial artery  Catheter size: 20 G   Guidance: ultrasound guided and palpation technique  Number of attempts: 2  Successful placement: yes   Post Assessment   Dressing Type: occlusive dressing applied and secured with tape.   Complications no  Circ/Move/Sens Assessment: normal.   Patient Tolerance: patient tolerated the procedure well with no apparent complications

## 2023-02-02 NOTE — ANESTHESIA PREPROCEDURE EVALUATION
Anesthesia Evaluation     Patient summary reviewed   no history of anesthetic complications:  NPO Solid Status: > 8 hours             Airway   Mallampati: II  TM distance: >3 FB  Neck ROM: full  Dental          Pulmonary    (-) COPD, asthma, sleep apnea, not a smoker  Cardiovascular   Exercise tolerance: excellent (>7 METS)    (+) hyperlipidemia,   (-) pacemaker, past MI, angina, cardiac stents    ROS comment: Type B aortic dissection    Neuro/Psych  (+) seizures well controlled,    (-) TIA, CVA  GI/Hepatic/Renal/Endo    (+) obesity,     (-) GERD, liver disease, no renal disease, diabetes    Musculoskeletal     Abdominal    Substance History      OB/GYN          Other                        Anesthesia Plan    ASA 4     general and [place]     intravenous induction     Anesthetic plan, risks, benefits, and alternatives have been provided, discussed and informed consent has been obtained with: patient.    Use of blood products discussed with patient  Consented to blood products.       CODE STATUS:    Code Status (Patient has no pulse and is not breathing): CPR (Attempt to Resuscitate)  Medical Interventions (Patient has pulse or is breathing): Full Support

## 2023-02-02 NOTE — OP NOTE
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  Ultrasound-guided cannulation of bilateral common femoral arteries  2.  Introduction of catheter/sheath into the abdominal aorta and thoracic aorta  3.  Arch and abdominal angiogram with radiographic supervision and interpretation  4.  Intravascular ultrasound (IVUS) of the ascending, arch, and descending thoracic aorta  5.  Placement of a Medtronic Valiant [place] thoracic endograft with coverage of the LSA measuring 32 x 32 x 200  6.  Placement of a Medtronic Valiant [place] distal extension piece extending down to the celiac artery measuring 42 x 42 x 200  7.  Placement of an 8 Fr Aptus  sheath with selective cannulation of the celiac artery  8.  Stenting of the proximal celiac artery using a 7 x 15 mm Gerber VBX stent graft  9.  Completion thoracic and abdominal angiograms with radiographic supervision and interpretation  10.  Left upper extremity brachial artery cutdown/exposure  11.  Placement of the 8 Fr Aptus  sheath into the proximal left subclavian artery with a guiding 6 Fr Destination sheath  12.  Laser fenestration of the thoracic endograft from the left subclavian artery using a 1.7 mm [employer] Excimer laser catheter  13.  Predilatation of the graft fenestration using a 6 x 20 mm EverCross balloon and a 6 x 30 mm Viatrac balloon  14.  Placement of an 11 x 39 mm and 11 x 29 mm Gerber VBX covered stent grafts in the proximal left subclavian artery extending out into the fenestrated thoracic endograft  15.  Completion left upper extremity angiogram with radiographic supervision and interpretation  16.  Perclose closure of the right common femoral artery and Mynx closure of the left common femoral artery       SURGEON: Jonnathan Farrar DO   CO-SURGEON:  Riley Jefferson MD     ANESTHESIA: General    PREPARATION: Routine.    STAFF: Circulator: Francesca Barlow RN; Susan Caro RN  Scrub Person: Mariya Olivares; Faheem Ortiz; Nuris Ely  Vascular Radiology Technician: Joleen Dennison    Estimated Blood Loss: 50 mL    SPECIMENS: None    COMPLICATIONS: None    DRAIN: Lumbar spinal drain placed by Dr. Cerda    INDICATIONS: Shilpa Zhang is a 57 y.o. female with known history of hyperlipidemia and seizure disorder.  She reports that she developed upper respiratory-like symptoms approximately 1 week ago along with dull aching back pain between her shoulder blades.  She was treated as an outpatient with antibiotics however she noticed no improvement.  She does admit that she had an episode of vomiting with this pain x1 event 4 days ago.  At this time she went to stay with her dad because she did not feel well.  She reports increasing fatigue and difficulty getting up and moving around since symptoms began.  Back pain continue to linger which prompted her to present to [facility] ER today for further evaluation.  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  Given this she was transferred to [facility] for higher level of care.  During transport she was transfused 1 unit PRBC.  She does complain of ongoing back pain but states at present the pain is dull.  She is a non-smoker.  She had a CTA of the chest/abdomen/pelvis in which I personally reviewed. The indications, risks, and possible complications of the procedure were explained to the patient, who voiced understanding and wished to proceed with surgery.     PROCEDURE IN DETAIL: The patient was taken to the operating room and placed on the operating table in a supine position. After general anesthesia was  obtained, the bilateral groins was prepped and draped in a sterile manner.  Under ultrasound guidance and using a micropuncture technique the right common femoral artery was cannulated and a micro sheath was placed.  Advantage Glidewire was advanced into the aorta and a 7 [place]isian sheath was placed for predilatation.  The first Perclose device was placed and deployed at the 2 o'clock position.  The second Perclose device was placed and deployed at the 10 o'clock position.  The 11 [place]isian sheath was then placed.  The patient was given 1000 units of intravenous heparin.  Under ultrasound guidance and using a micropuncture technique the left common femoral artery was cannulated and a micro sheath was placed.  Advantage Glidewire was advanced into the aorta and a short 6 [place]ian sheath was placed.  With the C arm in about 50 degrees Spanish, access into the thoracic aorta was achieved with the advantage Glidewire and Omni Flush catheter.  The double curved Lunderquist wire was placed up to the aortic valve.  Intravascular ultrasound (IVUS) was then performed of the a sending, arch, and descending thoracic aorta to confirm that we were in the true lumen.  The patient was given 5000 units of intravenous heparin.  The C arm was placed at about 80 degrees Spanish and an arch angiogram was performed.  The patient had a bovine arch and a large takeoff of the left subclavian artery.  Both of these arteries were marked on the screen.  The Medtronic Valiant [place] thoracic endograft measuring 32 x 32 x 200 was successfully placed and deployed at the takeoff of the innominate artery extending down into the mid to distal descending thoracic aorta.  The Omni Flush catheter was retrieved down into the distal aorta at the level of the diaphragm.  An angiogram was performed displaying the celiac and SMA.  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  A Medtronic Valiant [place] distal extension piece measuring 42 x 42 x 200 was  placed and deployed down to the level of the celiac artery.  A 20 [place]an sheath was placed in the right common femoral artery.  There was excellent expansion of the entire thoracic endograft in the true lumen.  This was confirmed with the intravascular ultrasound (IVUS) and completion angiogram.  Next, an 8 Fr Aptus  sheath was placed from the right groin through the 20 [place]an sheath up to the level of the celiac artery.  Selective cannulation was made of the celiac artery with the advantage Glidewire.  A 7 x 15 mm [place] VBX covered stent graft was placed in the proximal celiac artery with approximately 2 mm extending out to the aorta.  Completion angiogram was performed which showed the celiac artery to be widely patent.  At this point, the patient was given an additional 3000 units of intravenous heparin.  Next attention was turned to the left upper extremity which was then properly prepped and draped in the standard sterile fashion.  A longitudinal incision was made overlying the distal brachial artery just proximal to the antecubital fossa.  Careful dissection was made down through the subcutaneous tissues using the Bovie cautery to ensure hemostasis.  The brachial artery was quickly identified and encircled with Vesseloops for proximal and distal control.  Under direct visualization, and using a micropuncture needle, the brachial artery was directly cannulated and a micro sheath was placed.  The advantage Glidewire was advanced up into the proximal left subclavian artery under fluoroscopic guidance.  The 8 Fr Aptus  was sheath was placed over the wire up into the proximal left subclavian artery.  A 6 [place]an destination sheath was also placed through this as a guiding sheath.  The 1.7 mm [employer] Excimer laser catheter was placed over an 014 command wire down to the level of the thoracic endograft.  Laser fenestration was then performed of the endograft allowing access into the thoracic aorta.   This was done 3 separate times to widen out the fenestration.  The wire was easily advanced into the descending thoracic aorta.  Both the 6 x 20 mm EverCross balloon and the 6 x 30 mm Viatrac balloons were used to predilate the fenestration in the endograft.  Next, an 11 x 39 mm [place] VBX was placed out through the fenestration in the left subclavian artery with approximately 3 to 4 mm out into the thoracic aorta.  It was successfully deployed.  An 11 x 29 mm [place] VBX covered stent graft was used as a proximal extension piece up to the level of the left vertebral artery.  Completion left upper extremity angiogram was performed which showed rapid flow out into the thoracic endograft through the left subclavian fenestration.  There was adequate flow in the left vertebral and left internal mammary.  At this point, we felt the result was excellent and no further intervention was warranted.  The sheaths and wire were removed.  There was excellent antegrade and retrograde bleeding from the brachial artery.  Using a 6-0 Prolene the access site was closed successfully.  Flow was reestablished down the arm.  There was palpable pulses at the wrist.  The wound bed was irrigated with antibiotic saline and hemostasis was observed.  The deep layers were closed with a 3-0 Vicryl in a running fashion.  The skin was then reapproximated using a 4-0 Monocryl subcuticular fashion.  The right groin was closed first with the Perclose devices successfully.  Direct pressure was held for an additional 10 to 15 minutes to help ensure hemostasis.  The Mynx device was then used to seal off the left common femoral artery.  Direct pressure was held for an additional 10 to 15 minutes to help ensure hemostasis.  All the wounds were cleaned.  Sterile dressings were applied. The patient tolerated the procedure well. Sponge and needle counts were correct. The patient was then awakened and extubated in the operating room and taken to the recovery room  in good condition.    Jonnathan Farrar, DO  Date: 2/2/2023 Time: 15:09 CST

## 2023-02-02 NOTE — ANESTHESIA PROCEDURE NOTES
Airway  Urgency: elective    Date/Time: 2/2/2023 11:39 AM  Airway not difficult    General Information and Staff    Patient location during procedure: OR  CRNA/CAA: GEORGE Chavez CRNA  SRNA: Maame Real SRNA  Indications and Patient Condition  Indications for airway management: airway protection    Preoxygenated: yes  MILS maintained throughout  Mask difficulty assessment: 1 - vent by mask    Final Airway Details  Final airway type: endotracheal airway      Successful airway: ETT  Cuffed: yes   Successful intubation technique: video laryngoscopy  Facilitating devices/methods: intubating stylet  Endotracheal tube insertion site: oral  Blade: Baker  Blade size: 3  ETT size (mm): 7.0  Cormack-Lehane Classification: grade I - full view of glottis  Placement verified by: chest auscultation and capnometry   Cuff volume (mL): 7  Measured from: gums  ETT/EBT to gums (cm): 22  Number of attempts at approach: 1  Assessment: lips, teeth, and gum same as pre-op and atraumatic intubation    Additional Comments  ETT placed by RODNEY Pedraza

## 2023-02-02 NOTE — CONSULTS
20 gauge 2.5 inch USGPIV placed in right forearm with 1 attempt(s).  20 gauge 2.5 inch USGPIV placed in right upper arm with 1 attempt(s).

## 2023-02-02 NOTE — OP NOTE
Cardiac Surgery Operative Note     Date of Procedure:  2/2/2023     Preoperative Diagnosis:   Type B Aortic Dissection with High Risk Features  Uncontrolled Hypertension  Hyperlipidemia  Seizures  Obese, BMI 31.6    Postoperative Diagnosis:  Same     Procedures Performed:  1. Endovascular Repair of Descending Thoracic Aorta, for Type B Aortic Dissection, with Coverage of LSA;   2. Percutaneous Access and Closure of Femoral Artery for Delivery of Endograft through Large Sheath; CPT +11210  3. Intravascular Ultrasound during Therapeutic Intervention, including radiographic supervision and interpretation; CPT +50539  4. Selective Angiography of Celiac Artery  5. Percutaneous Arterioplasty with Stenting of Celiac Artery  6. Left Brachial Artery Open Exposure for Vascular Access  7. Laser Fenestration of LSA Origin into TEVAR graft with Stenting  8. Nonselective Angiography of Aortic Arch and Distal Descending Aorta     Procedure Summary: TEVAR from Zone 2 (Patient with Bovine Aortic Arch, Proximal Extent is up to Origin of Common Innominate Trunk) to Celiac Artery;  Stenting of Celiac Artery; Laser Fenestration of Ostium of LSA into Thoracic Endograft with Stenting     Cardiothoracic Surgeon:  Riley Jefferson MD  Vascular Surgeon:  Jonnathan Farrar DO  Anesthesia Staff:  Camilo Kulkarni MD  Anesthesia Type:  General     Estimated Blood Loss: <100 cc     Drains: None     Specimens:  None      Operative Indications:  Ms. Zhang  is a 57-year-old female who presented after 5 days of back pain and abdominal pain.  She underwent work-up and was found to have a type B aortic dissection originating distal to left subclavian with retrograde dissection back to the left subclavian origin and distal dissection through the iliac arteries.  Her type B aortic dissection had high risk features with maximum aortic diameter of almost 5-1/2 cm, false lumen to true lumen ratio was much higher than 2-1.  There is no abnormality in the aortic  arch or a sending aorta.  Her aortic arch was bovine branching pattern.  She had continued pain and uncontrolled hypertension despite optimal medical therapy and therefore we elected to proceed with TEVAR with possible coverage of left subclavian artery and laser fenestration.  I discussed the risk and benefits with her she understood and agreed to proceed.     Operative Findings:    Successful TEVAR from Zone 2 (Patient with Bovine Aortic Arch, Proximal Extent is up to Origin of Common Innominate Trunk) to Celiac Artery; 32mm x 197mm Medtronic [place] Vallient Thoracic Endograft with 42mm x 197mm Medtronic [place] Valiant Thoracic Endograft Distally    Stenting of Celiac Artery using 7mm x19mm Stent    Laser Fenestration of Ostium of LSA into Thoracic Endograft with Stenting using 11mm x 39mm followed by 11mm x 29mm Stent    IVUS at beginning of case measured proximal landing zone to be approximately 31 mm and distal landing zone to be 42mm; Aortic Dissection with tear in the proximal to mid descending, IMH extending up to the LSA and was more pronounced than expected given scan, elected to cover LSA with laser fenestation, aortic arch proximal to the LSA appeared normal without dissection or IMH    Angiogram showed severe, 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  Induction of general anesthesia and placement of a single-lumen endotracheal tube was performed without remark.  Lumbar spinal drain was placed by Dr. Cerda of Neurosurgery, please see his operative dictation for details.  Appropriate arterial and venous access was established without remark.  He was then prepped and draped in normal sterile fashion.     Using  ultrasound guidance right common femoral artery was accessed using micropuncture sheath and wire was passed into the descending aorta, this was followed by 7 Maldivian arterial sheath.  The puncture track was incised and dilated.  We then preclosed the right common femoral artery with 2 Perclose devices at 10:00 and 2:00 and 11 [place]ian sheath was advanced without difficulty.  Through this catheter was advanced in the ascending aorta and a double curve Lunderquist wire was passed and left resting against aortic valve.     We then used ultrasound guidance to access the left common femoral artery with micropuncture sheath followed by wire and then 6 [place]ian sheath.  Through this  angiogram catheter was advanced over the Glidewire and into the aortic arch.     Over the Lunderquist wire IVUS was performed and identified the left subclavian artery, the descending dissection, aortic tear, IMH in proximal portion that was abutting the LSA.  Given this we felt LSA coverage would be safest in order to avoid retrograde dissection.  We then used IVUS to measure the proximal and distal landing zones, proximal was approximately 31mm, the distal was approximately 42mm.  With this we selected a 32 x 32 x 197 [place] Medtronic endograft.  IVUS was removed and over the Lunderquist in the right groin the endograft was advanced into the descending aorta.  The endograft was advanced to just at the takeoff of the common innominate trunk.  We then performed arch angiogram which confirmed the takeoff of the bovine trunk and left subclavian artery.  With blood pressure map around 60, the thoracic endograft was successfully deployed with covered portion abutting the bovine trunk takeoff.  Completion angiogram was performed at the end of deployment and showed adequate patency of the bovine trunk artery and coverage of the LSA.  A distal aortic angiogram was completed in distal descending aorta, this showed severe narrowing nearly occlusive disease  of the proximal celiac artery.  We marked celiac artery and exchanged the 32mm endograft for a [id number] endograft.  It was deployed with distal end abutting the celiac artery.  Completion angiogram showed good expansion with much improvement in size of the true lumen, continued severe ostial stenosis of the celiac artery.  The endograft delivery system was removed from the right groin exchanged for an 20 [place] sheath.  IVUS was performed and showed excellent expansion of the true lumen throughout descending, normal appearing ascending without IMH or dissection.  We felt like result was excellent.      We then turned our attention to the celiac artery stenosis.  Aptus sheath was advanced and the celiac artery was accessed with Advantage Glidewire.  Selective angiogram was performed confirming celiac artery and showing severe ostial stenosis.  Over this a 7mm x 19mm VBX stent was advanced into place and deployed at nominal inflation.  Completion non-selective angiography was performed showing excellent result.      We then turned attention to laser fenestration of LSA ostium into the TEVAR graft.  This portion was completed by Dr. Farrar.  Cutdown performed of left brachial artery, artery accessed and wire advanced to the TEVAR graft.  Aptus sheath was advanced and eventually laser advanced and perpendicular position confirmed again the TEVAR graft.  Laser deployed and access gained into the TEVAR graft.  We then balloon dilated the access point with 6mm balloon.  We then deployed a [id number] VBX stent into the TEVAR graft.  Completion angiography showed distal leak of contrast and therefore elected to extend the stent up to origin of the left vertebral artery.  It was extended with [id number] VBX stent with excellent result and no resultant retrograde leak around the distal extent.  With this all branchial access was removed and the artery primarily closed.  The wound was closed in anatomic layers.  There was a  normal distal pulse.    The right femoral arterial sheath was removed and Perclose is tied down which sealed the artery with excellent hemostasis.  The left access was removed and closed with a minx device.  Sterile dressings were placed.  The patient tolerated procedure well was extubated operating room.  All sponge needle and instrument counts were correct in the case.  She was transferred the PACU in stable condition.           Complications: None     Disposition: Transferred to ICU in stable condition.     Riley Jefferson MD  Cardiothoracic Surgeon

## 2023-02-02 NOTE — ANESTHESIA POSTPROCEDURE EVALUATION
"Patient: Shilpa Zhang    Procedure Summary     Date: 02/02/23 Room / Location:  [facility] OR  /  [facility] OR 12    Anesthesia Start: 1134 Anesthesia Stop: 1534    Procedures:       THORACIC AORTIC ANEURYSM REPAIR USING ENDOGRAFT (Thigh)      BRACHIAL ARTERY EXPOSURE FOR LASER FENESTRATION OF THORACIC ENDOGRAFT FOR STENT PLACEMENT (Arm Upper)      THORACIC AORTIC ANEURYSM REPAIR (Chest)      LUMBAR DRAIN INSERTION EXTERNAL (Spine Lumbar) Diagnosis:       Dissection of descending thoracic aorta      (Dissection of descending thoracic aorta [I71.012])    Surgeons: Jonnathan Farrar DO; Riley Jefferson MD; Elie Cerda MD Provider: GOERGE Chavez CRNA    Anesthesia Type: general, [place] ASA Status: 4          Anesthesia Type: general, Claudia    Vitals  Vitals Value Taken Time   /58 02/02/23 1600   Temp 97.1 °F (36.2 °C) 02/02/23 1605   Pulse 65 02/02/23 1610   Resp 16 02/02/23 1605   SpO2 92 % 02/02/23 1607   Vitals shown include unvalidated device data.        Post Anesthesia Care and Evaluation    PONV Status: none  Comments: Patient d/c from PACU prior to anes eval based on Amy score.  Please see RN notes for details of d/c criteria.    Blood pressure 141/58, pulse 66, temperature 97.1 °F (36.2 °C), temperature source Temporal, resp. rate 16, height 167.6 cm (66\"), weight 88.7 kg (195 lb 8.8 oz), SpO2 92 %.          "

## 2023-02-02 NOTE — PLAN OF CARE
Goal Outcome Evaluation:      Pt A&Ox4 and afebrile. JONY. VSS. Cardene maxed at 15 and Esmolol at 25. No complaints of pain. However, pt complained of indigestion. Dr. Jefferson ordered tums, pepcid, and protonix. Pt has remained NPO since midnight and Chlorhexidine treatment given in preparation for surgery today. Consent signed on day shift yesterday.

## 2023-02-03 LAB
ANION GAP SERPL CALCULATED.3IONS-SCNC: 9 MMOL/L (ref 5–15)
BASOPHILS # BLD AUTO: 0.02 10*3/MM3 (ref 0–0.2)
BASOPHILS NFR BLD AUTO: 0.2 % (ref 0–1.5)
BUN SERPL-MCNC: 5 MG/DL (ref 6–20)
BUN/CREAT SERPL: 20 (ref 7–25)
CALCIUM SPEC-SCNC: 8.2 MG/DL (ref 8.6–10.5)
CHLORIDE SERPL-SCNC: 105 MMOL/L (ref 98–107)
CO2 SERPL-SCNC: 25 MMOL/L (ref 22–29)
CREAT SERPL-MCNC: 0.25 MG/DL (ref 0.57–1)
DEPRECATED RDW RBC AUTO: 49.4 FL (ref 37–54)
EGFRCR SERPLBLD CKD-EPI 2021: 129.5 ML/MIN/1.73
EOSINOPHIL # BLD AUTO: 0.23 10*3/MM3 (ref 0–0.4)
EOSINOPHIL NFR BLD AUTO: 1.8 % (ref 0.3–6.2)
ERYTHROCYTE [DISTWIDTH] IN BLOOD BY AUTOMATED COUNT: 14.1 % (ref 12.3–15.4)
GLUCOSE BLDC GLUCOMTR-MCNC: 107 MG/DL (ref 70–130)
GLUCOSE BLDC GLUCOMTR-MCNC: 118 MG/DL (ref 70–130)
GLUCOSE BLDC GLUCOMTR-MCNC: 99 MG/DL (ref 70–130)
GLUCOSE SERPL-MCNC: 119 MG/DL (ref 65–99)
HCT VFR BLD AUTO: 36.5 % (ref 34–46.6)
HGB BLD-MCNC: 11.7 G/DL (ref 12–15.9)
LYMPHOCYTES # BLD AUTO: 0.97 10*3/MM3 (ref 0.7–3.1)
LYMPHOCYTES NFR BLD AUTO: 7.6 % (ref 19.6–45.3)
MCH RBC QN AUTO: 30.5 PG (ref 26.6–33)
MCHC RBC AUTO-ENTMCNC: 32.1 G/DL (ref 31.5–35.7)
MCV RBC AUTO: 95.1 FL (ref 79–97)
MONOCYTES # BLD AUTO: 1.38 10*3/MM3 (ref 0.1–0.9)
MONOCYTES NFR BLD AUTO: 10.9 % (ref 5–12)
NEUTROPHILS NFR BLD AUTO: 10.01 10*3/MM3 (ref 1.7–7)
NEUTROPHILS NFR BLD AUTO: 78.9 % (ref 42.7–76)
PLATELET # BLD AUTO: 138 10*3/MM3 (ref 140–450)
PMV BLD AUTO: 10.9 FL (ref 6–12)
POTASSIUM SERPL-SCNC: 3.7 MMOL/L (ref 3.5–5.2)
POTASSIUM SERPL-SCNC: 4 MMOL/L (ref 3.5–5.2)
QT INTERVAL: 426 MS
QTC INTERVAL: 443 MS
RBC # BLD AUTO: 3.84 10*6/MM3 (ref 3.77–5.28)
SODIUM SERPL-SCNC: 139 MMOL/L (ref 136–145)
WBC NRBC COR # BLD: 12.69 10*3/MM3 (ref 3.4–10.8)

## 2023-02-03 PROCEDURE — 94799 UNLISTED PULMONARY SVC/PX: CPT

## 2023-02-03 PROCEDURE — 25010000002 ENOXAPARIN PER 10 MG: Performed by: SURGERY

## 2023-02-03 PROCEDURE — 93005 ELECTROCARDIOGRAM TRACING: CPT | Performed by: SURGERY

## 2023-02-03 PROCEDURE — 99231 SBSQ HOSP IP/OBS SF/LOW 25: CPT | Performed by: NURSE PRACTITIONER

## 2023-02-03 PROCEDURE — 94761 N-INVAS EAR/PLS OXIMETRY MLT: CPT

## 2023-02-03 PROCEDURE — 82962 GLUCOSE BLOOD TEST: CPT

## 2023-02-03 PROCEDURE — 85025 COMPLETE CBC W/AUTO DIFF WBC: CPT | Performed by: NURSE PRACTITIONER

## 2023-02-03 PROCEDURE — 93010 ELECTROCARDIOGRAM REPORT: CPT | Performed by: HOSPITALIST

## 2023-02-03 PROCEDURE — 80048 BASIC METABOLIC PNL TOTAL CA: CPT | Performed by: NURSE PRACTITIONER

## 2023-02-03 PROCEDURE — 99024 POSTOP FOLLOW-UP VISIT: CPT | Performed by: SURGERY

## 2023-02-03 PROCEDURE — 25010000002 FUROSEMIDE PER 20 MG: Performed by: NURSE PRACTITIONER

## 2023-02-03 PROCEDURE — 84132 ASSAY OF SERUM POTASSIUM: CPT | Performed by: NURSE PRACTITIONER

## 2023-02-03 PROCEDURE — 25010000002 CEFAZOLIN PER 500 MG: Performed by: SURGERY

## 2023-02-03 RX ORDER — CARVEDILOL 3.12 MG/1
3.12 TABLET ORAL ONCE
Status: COMPLETED | OUTPATIENT
Start: 2023-02-03 | End: 2023-02-03

## 2023-02-03 RX ORDER — LOSARTAN POTASSIUM 50 MG/1
25 TABLET ORAL
Status: DISCONTINUED | OUTPATIENT
Start: 2023-02-03 | End: 2023-02-04

## 2023-02-03 RX ORDER — CALCIUM CHLORIDE 100 MG/ML
1 INJECTION INTRAVENOUS; INTRAVENTRICULAR ONCE
Status: DISCONTINUED | OUTPATIENT
Start: 2023-02-03 | End: 2023-02-03

## 2023-02-03 RX ORDER — FUROSEMIDE 10 MG/ML
20 INJECTION INTRAMUSCULAR; INTRAVENOUS ONCE
Status: COMPLETED | OUTPATIENT
Start: 2023-02-03 | End: 2023-02-03

## 2023-02-03 RX ORDER — CARVEDILOL 6.25 MG/1
6.25 TABLET ORAL 2 TIMES DAILY WITH MEALS
Status: DISCONTINUED | OUTPATIENT
Start: 2023-02-04 | End: 2023-02-04

## 2023-02-03 RX ADMIN — NICARDIPINE HYDROCHLORIDE 5 MG/HR: 2.5 INJECTION, SOLUTION INTRAVENOUS at 06:08

## 2023-02-03 RX ADMIN — CARVEDILOL 3.12 MG: 6.25 TABLET, FILM COATED ORAL at 07:54

## 2023-02-03 RX ADMIN — ACETAMINOPHEN 650 MG: 325 TABLET, FILM COATED ORAL at 12:12

## 2023-02-03 RX ADMIN — ATORVASTATIN CALCIUM 10 MG: 10 TABLET, FILM COATED ORAL at 09:16

## 2023-02-03 RX ADMIN — Medication 10 ML: at 20:09

## 2023-02-03 RX ADMIN — ACETAMINOPHEN 650 MG: 325 TABLET, FILM COATED ORAL at 17:38

## 2023-02-03 RX ADMIN — Medication 1 APPLICATION: at 20:08

## 2023-02-03 RX ADMIN — ASPIRIN 81 MG: 81 TABLET, COATED ORAL at 09:16

## 2023-02-03 RX ADMIN — FLUOXETINE HYDROCHLORIDE 40 MG: 20 CAPSULE ORAL at 09:16

## 2023-02-03 RX ADMIN — FUROSEMIDE 20 MG: 10 INJECTION, SOLUTION INTRAMUSCULAR; INTRAVENOUS at 12:09

## 2023-02-03 RX ADMIN — CARVEDILOL 3.12 MG: 6.25 TABLET, FILM COATED ORAL at 17:38

## 2023-02-03 RX ADMIN — PHENYTOIN SODIUM 500 MG: 100 CAPSULE ORAL at 20:07

## 2023-02-03 RX ADMIN — NICARDIPINE HYDROCHLORIDE 7.5 MG/HR: 2.5 INJECTION, SOLUTION INTRAVENOUS at 10:47

## 2023-02-03 RX ADMIN — ACETAMINOPHEN 650 MG: 325 TABLET, FILM COATED ORAL at 05:23

## 2023-02-03 RX ADMIN — CLOPIDOGREL 75 MG: 75 TABLET, FILM COATED ORAL at 17:38

## 2023-02-03 RX ADMIN — CEFAZOLIN 2 G: 2 INJECTION, POWDER, FOR SOLUTION INTRAMUSCULAR; INTRAVENOUS at 04:09

## 2023-02-03 RX ADMIN — CALCIUM CHLORIDE 1 G: 100 INJECTION INTRAVENOUS; INTRAVENTRICULAR at 09:19

## 2023-02-03 RX ADMIN — PANTOPRAZOLE SODIUM 40 MG: 40 INJECTION, POWDER, FOR SOLUTION INTRAVENOUS at 05:23

## 2023-02-03 RX ADMIN — ACETAMINOPHEN 650 MG: 325 TABLET, FILM COATED ORAL at 22:31

## 2023-02-03 RX ADMIN — Medication 10 ML: at 08:30

## 2023-02-03 RX ADMIN — LOSARTAN POTASSIUM 25 MG: 50 TABLET, FILM COATED ORAL at 09:15

## 2023-02-03 RX ADMIN — HYDROCODONE BITARTRATE AND ACETAMINOPHEN 1 TABLET: 5; 325 TABLET ORAL at 07:55

## 2023-02-03 RX ADMIN — Medication 1 APPLICATION: at 12:10

## 2023-02-03 RX ADMIN — ENOXAPARIN SODIUM 40 MG: 100 INJECTION SUBCUTANEOUS at 09:16

## 2023-02-03 RX ADMIN — CARVEDILOL 3.12 MG: 3.12 TABLET, FILM COATED ORAL at 20:07

## 2023-02-03 NOTE — PLAN OF CARE
Goal Outcome Evaluation:  Pt Aox4; afebrile; PERRLA; VSS. Remains stable following surgery. Groin cath sites are soft, clean dry and intact. She has good pulses in all four extremities. Has sensation and movement in bilateral extremities and no numbness present. CSF clear. Draining 20 mL q 2hrs per order.

## 2023-02-03 NOTE — PROGRESS NOTES
LOS: 3 days   Patient Care Team:  Yuriy Romero MD as PCP - General (Family Medicine)    Chief Complaint: TBAD    Subjective     The patient was seen/examined the bedside.  No issues overnight.  She does have some back pain from laying in bed but she denies any chest or back tightness that she was experienced before the surgery.  Groins are soft without hematoma.  Pulses are palpable in the feet and left hand.    Objective     Vital Signs  Temp:  [96.8 °F (36 °C)-99.2 °F (37.3 °C)] 97.3 °F (36.3 °C)  Heart Rate:  [] 72  Resp:  [16-20] 16  BP: (120-195)/() 152/68  Arterial Line BP: (127-182)/(46-87) 157/52    Physical Exam  Vitals and nursing note reviewed.   Constitutional:       Appearance: She is well-developed.   HENT:      Head: Normocephalic and atraumatic.   Eyes:      General: No scleral icterus.     Pupils: Pupils are equal, round, and reactive to light.   Neck:      Thyroid: No thyromegaly.      Vascular: No carotid bruit or JVD.   Cardiovascular:      Rate and Rhythm: Normal rate and regular rhythm.      Pulses:           Carotid pulses are 2+ on the right side and 2+ on the left side.       Femoral pulses are 2+ on the right side and 2+ on the left side.       Popliteal pulses are 2+ on the right side and 2+ on the left side.        Dorsalis pedis pulses are 2+ on the right side and 2+ on the left side.        Posterior tibial pulses are 2+ on the right side and 2+ on the left side.      Heart sounds: Normal heart sounds.   Pulmonary:      Effort: Pulmonary effort is normal.      Breath sounds: Normal breath sounds.   Abdominal:      General: Bowel sounds are normal. There is no distension or abdominal bruit.      Palpations: Abdomen is soft. There is no mass.      Tenderness: There is no abdominal tenderness.   Musculoskeletal:         General: Normal range of motion.      Cervical back: Neck supple.   Lymphadenopathy:      Cervical: No cervical adenopathy.   Skin:     General:  Skin is warm and dry.   Neurological:      Mental Status: She is alert and oriented to person, place, and time.      Cranial Nerves: No cranial nerve deficit.      Sensory: No sensory deficit.         Laboratory Data:   Results from last 7 days   Lab Units 02/03/23  0543 02/02/23  1537 02/02/23  0641   WBC 10*3/mm3 12.69* 10.86* 10.76   HEMOGLOBIN g/dL 11.7* 11.2* 12.5   HEMATOCRIT % 36.5 35.8 39.1   PLATELETS 10*3/mm3 138* 165 181       Results from last 7 days   Lab Units 02/03/23  0543 02/02/23  1537 02/02/23  0641 02/01/23  0503 01/31/23  1406   SODIUM mmol/L 139 136 138   < > 143   POTASSIUM mmol/L 4.0 4.3 4.0   < > 3.9   CHLORIDE mmol/L 105 106 103   < > 105   CO2 mmol/L 25.0 24.0 26.0   < > 27.0   BUN mg/dL 5* 10 10   < > 17   CREATININE mg/dL 0.25* 0.44* 0.25*   < > 0.36*   CALCIUM mg/dL 8.2* 7.7* 8.3*   < > 8.7   BILIRUBIN mg/dL  --   --   --   --  1.0   ALK PHOS U/L  --   --   --   --  143*   ALT (SGPT) U/L  --   --   --   --  17   AST (SGOT) U/L  --   --   --   --  28   GLUCOSE mg/dL 119* 139* 143*   < > 125*    < > = values in this interval not displayed.     Results from last 7 days   Lab Units 01/31/23  1406   INR  1.38*   APTT seconds 37.0*         Assessment & Plan       Aortic dissection (HCC)    Dissection of descending thoracic aorta    Nausea & vomiting      Plan:  1.  Continue strict blood pressure control  2.  Aspirin/Plavix/statin  3.  Okay to be out of bed from my standpoint.        Jonnathan Farrar, DO  Vascular Surgery  [phone]  02/03/23  14:41 CST

## 2023-02-03 NOTE — PROGRESS NOTES
"Patient name: Shilap Zhang  Patient : 1965  VISIT # [id number]  MR #[id number]    Procedure: Endovascular Repair of Descending Thoracic Aorta, for Type B Aortic Dissection, with Coverage of LSA, Percutaneous Access and Closure of Femoral Artery for Delivery of Endograft through Large Sheath, Intravascular Ultrasound during Therapeutic Intervention, including radiographic supervision and interpretation; Selective Angiography of Celiac Artery, Percutaneous Arterioplasty with Stenting of Celiac Artery, Left Brachial Artery Open Exposure for Vascular Access, Laser Fenestration of LSA Origin into TEVAR graft with Stenting, Nonselective Angiography of Aortic Arch and Distal Descending Aorta by Dr. Jefferson and Dr. Farrar    Procedure Date: 2023    POD:1 Day Post-Op    Subjective   Chief complaint: Back pain    Patient is resting in bed in ICU tolerating 2 L nasal cannula.  She remains on Cardene to maintain systolic blood pressure less than 160.  She complains of back pain from lying in the bed.  She states this pain is different than what she experienced prior to surgery and she reports no further feelings of chest or back tightness.    Telemetry: Sinus rhythm 60s   IV drips: Cardene    ROS: No fevers or chills.  No shortness of breath.  Improving back pain.  No chest pain.        Objective     Visit Vitals  /62   Pulse 62   Temp 97 °F (36.1 °C) (Axillary)   Resp 16   Ht 167.6 cm (66\")   Wt 88.7 kg (195 lb 8.8 oz)   SpO2 96%   BMI 31.56 kg/m²       Intake/Output Summary (Last 24 hours) at 2/3/2023 0808  Last data filed at 2/3/2023 0500  Gross per 24 hour   Intake 2833 ml   Output 2270 ml   Net 563 ml       Lab:     CBC:  Results from last 7 days   Lab Units 23  0543 23  1537 23  0641   WBC 10*3/mm3 12.69* 10.86* 10.76   HEMATOCRIT % 36.5 35.8 39.1   PLATELETS 10*3/mm3 138* 165 181          BMP:  Results from last 7 days   Lab Units 23  0543 23  1537 23  0641   SODIUM " mmol/L 139 136 138   POTASSIUM mmol/L 4.0 4.3 4.0   CHLORIDE mmol/L 105 106 103   CO2 mmol/L 25.0 24.0 26.0   GLUCOSE mg/dL 119* 139* 143*   BUN mg/dL 5* 10 10   CREATININE mg/dL 0.25* 0.44* 0.25*          COAG:  Results from last 7 days   Lab Units 01/31/23  1406   INR  1.38*   APTT seconds 37.0*       IMAGES:       Imaging Results (Last 24 Hours)     Procedure Component Value Units Date/Time    IR Prox Dis Prost Endov Rep Initial Ves [[id number]] Collected: 02/02/23 1600     Updated: 02/03/23 0706    Narrative:      Performed by Dr. Farrar and Dr. Jefferson in the operating room. Please see  operative dictation.  This report was finalized on 02/02/2023 16:00 by Dr. Jonnathan Farrar MD.    FL C Arm During Surgery [[id number]] Collected: 02/02/23 1600     Updated: 02/03/23 0706    Narrative:      Performed by Dr. Farrar and Dr. Jefferson in the operating room. Please see  operative dictation.  This report was finalized on 02/02/2023 16:00 by Dr. Jonnathan Farrar MD.    XR Chest 1 View [[id number]] Collected: 02/02/23 1604     Updated: 02/02/23 1610    Narrative:      HISTORY: Postop line and tube placement assessment     CXR: A frontal view the chest is obtained.     COMPARISON: No prior chest x-ray     FINDINGS: Endovascular repair of the descending thoracic aortic aneurysm  or dissection with endovascular graft in place. A stent extending into  the left subclavian artery.     Prominent mediastinal contours are followed on subsequent imaging. Small  left pleural effusion. Left greater than right basilar opacities.  Prominent pulmonary vascular structures. No pneumothorax. No acute  regional bony pathology.       Impression:      1. Endovascular graft of the descending thoracic aorta extending to the  arch. Graft likely of the left subclavian artery. Prominent mediastinal  contour may be followed on subsequent imaging. Small left pleural  effusion with probable patchy basilar atelectasis and mild venous  congestion.  This  report was finalized on 02/02/2023 16:07 by Dr. Chantelle Wan MD.            Physical Exam:  General: Alert, oriented. No apparent distress.  Obese.  On 2 L nasal cannula  Cardiovascular: Regular rate and rhythm without murmur, rubs, or gallops.    Pulmonary: Clear to auscultation bilaterally without wheezing, rubs, or rales.  Abdomen: Soft, nondistended, and nontender.  Extremities: Warm, moves all extremities. No edema. Bilateral groin sites C/D/I.    Neurologic:  Grossly intact with no focal deficits.            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  Wean Cardene as tolerated  Change losartan from nightly dosing to morning dosing.  Lasix 20 mg IV x1 dose this morning  Calcium chloride 1 g x 1 dose this morning  Discontinue Mcfarland catheter  Okay from CT surgery standpoint to be up out of bed and walking when okay with neurosurgery.  Discussed with patient and nursing.      Jayde Alvarez, ARTI  02/03/23  08:08 CST

## 2023-02-03 NOTE — PROGRESS NOTES
"NEUROSURGERY DAILY PROGRESS NOTE    ASSESSMENT:   Shilpa Zhang is a 57 y.o. with a significant medical history of seizures well-controlled on Dilantin and obesity.  She presented to [facility] with a complaint of generalized fatigue, right shoulder and back pain, nausea, and vomiting.  Physical exam findings of neurologically intact. Her imaging shows a type B aortic dissection.    History reviewed. No pertinent past medical history.  Active Hospital Problems    Diagnosis    • **Aortic dissection (HCC)    • Dissection of descending thoracic aorta    • Nausea & vomiting      PLAN:   Neuro: Stable.  Intact/at baseline   1 Day Post-Op (2/2/2023) lumbar drain placement   Drain total = 140 mL, removed   Pressure dressing and Tegaderm applied.   Flat for at least 2 hours.  Call for CSF leak   Neurochecks per policy.  Call for decline     CHIEF COMPLAINT:   Generalized fatigue, right shoulder and back pain, nausea, and vomiting    Subjective  Symptom stable.  Doing well    Temp:  [96.8 °F (36 °C)-99.2 °F (37.3 °C)] 97 °F (36.1 °C)  Heart Rate:  [47-75] 67  Resp:  [16-20] 16  BP: (120-195)/() 135/62  Arterial Line BP: (127-182)/(46-87) 145/49    Objective:  General Appearance:  Well-appearing and in no acute distress.    Vital signs: (most recent): Blood pressure 135/62, pulse 67, temperature 97 °F (36.1 °C), temperature source Axillary, resp. rate 16, height 167.6 cm (66\"), weight 88.7 kg (195 lb 8.8 oz), SpO2 95 %.      Neurologic Exam     Mental Status   Oriented to person, place, and time.   Attention: normal. Concentration: normal.   Speech: speech is normal   Level of consciousness: alert    Cranial Nerves     CN II   Visual fields full to confrontation.     CN III, IV, VI   Pupils are equal, round, and reactive to light.  Extraocular motions are normal.     CN V   Facial sensation intact.     CN VII   Facial expression full, symmetric.     CN VIII   CN VIII normal.     CN IX, X   CN IX normal.     CN XI "   CN XI normal.     Motor Exam   Right arm tone: normal  Left arm tone: normal  Right leg tone: normal  Left leg tone: normal    Strength   Right deltoid: 5/5  Left deltoid: 5/5  Right biceps: 5/5  Left biceps: 5/5  Right triceps: 5/5  Left triceps: 5/5  Right wrist extension: 5/5  Left wrist extension: 5/5  Right iliopsoas: 5/5  Left iliopsoas: 5/5  Right quadriceps: 5/5  Left quadriceps: 5/5  Right anterior tibial: 5/5  Left anterior tibial: 5/5  Right gastroc: 5/5  Left gastroc: 5/5  Right EHL 5/5  Left EHL 5/5       Sensory Exam   Right arm light touch: normal  Left arm light touch: normal  Right leg light touch: normal  Left leg light touch: normal    Gait, Coordination, and Reflexes     Tremor   Resting tremor: absent  Intention tremor: absent  Action tremor: absent    Drains:   Lumbar Drain (Active)   Drain Status Clamped 02/03/23 0800   Drain Level (cm/H2O) 9 cm 02/02/23 1527   CSF Color Clear 02/03/23 0800   Site Description Unable to view 02/03/23 0800   Dressing Status Clean;Dry;Intact 02/03/23 0800   CSF Output (mL) 20 mL 02/03/23 1100       [REMOVED] Urethral Catheter (Removed)   Daily Indications Acute Urinary Retention 02/01/23 0800   Site Assessment Clean;Skin intact 02/01/23 0800   Collection Container Standard drainage bag 02/01/23 0800   Securement Method Securing device 02/01/23 0800   Catheter care complete Yes 02/01/23 0800   Output (mL) 200 mL 02/01/23 0815       [REMOVED] Urethral Catheter Silicone 16 Fr. (Removed)   Daily Indications Hourly Output in Critical Unstable Patient requiring Frequent Intervention (hemodynamics, titration or life supportive therapy) 02/03/23 0800   Site Assessment Clean;Skin intact 02/03/23 0400   Collection Container Standard drainage bag 02/03/23 0800   Securement Method Securing device 02/03/23 0800   Catheter care complete Yes 02/02/23 2000   Output (mL) 225 mL 02/03/23 1100     Output by Drain (mL) 02/02/23 0701 - 02/02/23 1900 02/02/23 1901 - 02/03/23 0700  02/03/23 0701 - 02/03/23 1211 Range Total   Requested LDAs do not have output data documented.     Imaging Results (Last 24 Hours)     Procedure Component Value Units Date/Time    IR Prox Dis Prost Endov Rep Initial Ves [[id number]] Collected: 02/02/23 1600     Updated: 02/03/23 0706    Narrative:      Performed by Dr. Farrar and Dr. Jefferson in the operating room. Please see  operative dictation.  This report was finalized on 02/02/2023 16:00 by Dr. Jonnathan Farrar MD.    FL C Arm During Surgery [[id number]] Collected: 02/02/23 1600     Updated: 02/03/23 0706    Narrative:      Performed by Dr. Farrar and Dr. Jefferson in the operating room. Please see  operative dictation.  This report was finalized on 02/02/2023 16:00 by Dr. Jonnathan Farrar MD.    XR Chest 1 View [[id number]] Collected: 02/02/23 1604     Updated: 02/02/23 1610    Narrative:      HISTORY: Postop line and tube placement assessment     CXR: A frontal view the chest is obtained.     COMPARISON: No prior chest x-ray     FINDINGS: Endovascular repair of the descending thoracic aortic aneurysm  or dissection with endovascular graft in place. A stent extending into  the left subclavian artery.     Prominent mediastinal contours are followed on subsequent imaging. Small  left pleural effusion. Left greater than right basilar opacities.  Prominent pulmonary vascular structures. No pneumothorax. No acute  regional bony pathology.       Impression:      1. Endovascular graft of the descending thoracic aorta extending to the  arch. Graft likely of the left subclavian artery. Prominent mediastinal  contour may be followed on subsequent imaging. Small left pleural  effusion with probable patchy basilar atelectasis and mild venous  congestion.  This report was finalized on 02/02/2023 16:07 by Dr. Chantelle Wan MD.        Lab Results (last 24 hours)     Procedure Component Value Units Date/Time    POC Glucose Once [[id number]]  (Normal) Collected: 02/03/23 1113     Specimen: Blood Updated: 02/03/23 1125     Glucose 118 mg/dL      Comment: : [id number] Tio ChavesMeter ID: [id number]       Basic Metabolic Panel [[id number]]  (Abnormal) Collected: 02/03/23 0543    Specimen: Blood Updated: 02/03/23 0621     Glucose 119 mg/dL      BUN 5 mg/dL      Creatinine 0.25 mg/dL      Sodium 139 mmol/L      Potassium 4.0 mmol/L      Chloride 105 mmol/L      CO2 25.0 mmol/L      Calcium 8.2 mg/dL      BUN/Creatinine Ratio 20.0     Anion Gap 9.0 mmol/L      eGFR 129.5 mL/min/1.73     Narrative:      GFR Normal >60  Chronic Kidney Disease <60  Kidney Failure <15      CBC & Differential [[id number]]  (Abnormal) Collected: 02/03/23 0543    Specimen: Blood Updated: 02/03/23 0606    Narrative:      The following orders were created for panel order CBC & Differential.  Procedure                               Abnormality         Status                     ---------                               -----------         ------                     CBC Auto Differential[[id number]]        Abnormal            Final result                 Please view results for these tests on the individual orders.    CBC Auto Differential [[id number]]  (Abnormal) Collected: 02/03/23 0543    Specimen: Blood Updated: 02/03/23 0606     WBC 12.69 10*3/mm3      RBC 3.84 10*6/mm3      Hemoglobin 11.7 g/dL      Hematocrit 36.5 %      MCV 95.1 fL      MCH 30.5 pg      MCHC 32.1 g/dL      RDW 14.1 %      RDW-SD 49.4 fl      MPV 10.9 fL      Platelets 138 10*3/mm3      Neutrophil % 78.9 %      Lymphocyte % 7.6 %      Monocyte % 10.9 %      Eosinophil % 1.8 %      Basophil % 0.2 %      Neutrophils, Absolute 10.01 10*3/mm3      Lymphocytes, Absolute 0.97 10*3/mm3      Monocytes, Absolute 1.38 10*3/mm3      Eosinophils, Absolute 0.23 10*3/mm3      Basophils, Absolute 0.02 10*3/mm3     POC Glucose Once [[id number]]  (Abnormal) Collected: 02/02/23 2202    Specimen: Blood Updated: 02/02/23 2213     Glucose 157 mg/dL      Comment:  : [id number] Berhane HaleyMeter ID: [id number]       Basic Metabolic Panel [[id number]]  (Abnormal) Collected: 02/02/23 1537    Specimen: Blood, Arterial Line Updated: 02/02/23 1606     Glucose 139 mg/dL      BUN 10 mg/dL      Creatinine 0.44 mg/dL      Sodium 136 mmol/L      Potassium 4.3 mmol/L      Chloride 106 mmol/L      CO2 24.0 mmol/L      Calcium 7.7 mg/dL      BUN/Creatinine Ratio 22.7     Anion Gap 6.0 mmol/L      eGFR 113.0 mL/min/1.73     Narrative:      GFR Normal >60  Chronic Kidney Disease <60  Kidney Failure <15      Magnesium [[id number]]  (Normal) Collected: 02/02/23 1537    Specimen: Blood, Arterial Line Updated: 02/02/23 1600     Magnesium 1.9 mg/dL     CBC (No Diff) [[id number]]  (Abnormal) Collected: 02/02/23 1537    Specimen: Blood, Arterial Line Updated: 02/02/23 1550     WBC 10.86 10*3/mm3      RBC 3.70 10*6/mm3      Hemoglobin 11.2 g/dL      Hematocrit 35.8 %      MCV 96.8 fL      MCH 30.3 pg      MCHC 31.3 g/dL      RDW 14.4 %      RDW-SD 51.4 fl      MPV 10.9 fL      Platelets 165 10*3/mm3         [id number]  Blade Triana APRN

## 2023-02-03 NOTE — CASE MANAGEMENT/SOCIAL WORK
Continued Stay Note  MANDY Benjamin     Patient Name: Shilpa Zhang  MRN: [id number]  Today's Date: 2/3/2023    Admit Date: 1/31/2023    Plan: Home   Discharge Plan     Row Name 02/03/23 1048       Plan    Plan Home    Plan Comments Patient plans to return home upon discharge.  Patient resides alone.  Patient currently does not have medical coverage but does qualify for Medicaid, which has been applied for and is currently pending.  If patient discharges prior to Medicaid becoming active may require assistance with discharge medications.  SW following.               Discharge Codes    No documentation.                     REHANA Nation

## 2023-02-04 ENCOUNTER — APPOINTMENT (OUTPATIENT)
Dept: GENERAL RADIOLOGY | Facility: HOSPITAL | Age: 58
DRG: 221 | End: 2023-02-04
Payer: MEDICAID

## 2023-02-04 ENCOUNTER — APPOINTMENT (OUTPATIENT)
Dept: CT IMAGING | Facility: HOSPITAL | Age: 58
DRG: 221 | End: 2023-02-04
Payer: MEDICAID

## 2023-02-04 LAB
ANION GAP SERPL CALCULATED.3IONS-SCNC: 11 MMOL/L (ref 5–15)
BUN SERPL-MCNC: 8 MG/DL (ref 6–20)
BUN/CREAT SERPL: 24.2 (ref 7–25)
CALCIUM SPEC-SCNC: 8.2 MG/DL (ref 8.6–10.5)
CHLORIDE SERPL-SCNC: 104 MMOL/L (ref 98–107)
CO2 SERPL-SCNC: 24 MMOL/L (ref 22–29)
CREAT SERPL-MCNC: 0.33 MG/DL (ref 0.57–1)
DEPRECATED RDW RBC AUTO: 49 FL (ref 37–54)
EGFRCR SERPLBLD CKD-EPI 2021: 121.1 ML/MIN/1.73
ERYTHROCYTE [DISTWIDTH] IN BLOOD BY AUTOMATED COUNT: 13.9 % (ref 12.3–15.4)
GLUCOSE BLDC GLUCOMTR-MCNC: 115 MG/DL (ref 70–130)
GLUCOSE BLDC GLUCOMTR-MCNC: 130 MG/DL (ref 70–130)
GLUCOSE BLDC GLUCOMTR-MCNC: 133 MG/DL (ref 70–130)
GLUCOSE SERPL-MCNC: 104 MG/DL (ref 65–99)
HCT VFR BLD AUTO: 36 % (ref 34–46.6)
HGB BLD-MCNC: 11.5 G/DL (ref 12–15.9)
MCH RBC QN AUTO: 30.3 PG (ref 26.6–33)
MCHC RBC AUTO-ENTMCNC: 31.9 G/DL (ref 31.5–35.7)
MCV RBC AUTO: 95 FL (ref 79–97)
PLATELET # BLD AUTO: 142 10*3/MM3 (ref 140–450)
PMV BLD AUTO: 11.6 FL (ref 6–12)
POTASSIUM SERPL-SCNC: 3.6 MMOL/L (ref 3.5–5.2)
RBC # BLD AUTO: 3.79 10*6/MM3 (ref 3.77–5.28)
SODIUM SERPL-SCNC: 139 MMOL/L (ref 136–145)
WBC NRBC COR # BLD: 12.61 10*3/MM3 (ref 3.4–10.8)

## 2023-02-04 PROCEDURE — 93010 ELECTROCARDIOGRAM REPORT: CPT | Performed by: INTERNAL MEDICINE

## 2023-02-04 PROCEDURE — 71045 X-RAY EXAM CHEST 1 VIEW: CPT

## 2023-02-04 PROCEDURE — 93010 ELECTROCARDIOGRAM REPORT: CPT | Performed by: EMERGENCY MEDICINE

## 2023-02-04 PROCEDURE — 97162 PT EVAL MOD COMPLEX 30 MIN: CPT

## 2023-02-04 PROCEDURE — 71260 CT THORAX DX C+: CPT

## 2023-02-04 PROCEDURE — 74177 CT ABD & PELVIS W/CONTRAST: CPT

## 2023-02-04 PROCEDURE — 97166 OT EVAL MOD COMPLEX 45 MIN: CPT | Performed by: OCCUPATIONAL THERAPIST

## 2023-02-04 PROCEDURE — 80048 BASIC METABOLIC PNL TOTAL CA: CPT | Performed by: SURGERY

## 2023-02-04 PROCEDURE — 82962 GLUCOSE BLOOD TEST: CPT

## 2023-02-04 PROCEDURE — 85027 COMPLETE CBC AUTOMATED: CPT | Performed by: SURGERY

## 2023-02-04 PROCEDURE — 93005 ELECTROCARDIOGRAM TRACING: CPT | Performed by: SURGERY

## 2023-02-04 PROCEDURE — 25010000002 FUROSEMIDE PER 20 MG: Performed by: NURSE PRACTITIONER

## 2023-02-04 PROCEDURE — 25010000002 ENOXAPARIN PER 10 MG: Performed by: SURGERY

## 2023-02-04 PROCEDURE — 25010000002 IOPAMIDOL 61 % SOLUTION: Performed by: SURGERY

## 2023-02-04 RX ORDER — FUROSEMIDE 10 MG/ML
20 INJECTION INTRAMUSCULAR; INTRAVENOUS ONCE
Status: DISCONTINUED | OUTPATIENT
Start: 2023-02-04 | End: 2023-02-04

## 2023-02-04 RX ORDER — LABETALOL HYDROCHLORIDE 5 MG/ML
10 INJECTION, SOLUTION INTRAVENOUS
Status: DISCONTINUED | OUTPATIENT
Start: 2023-02-04 | End: 2023-02-07 | Stop reason: HOSPADM

## 2023-02-04 RX ORDER — LOSARTAN POTASSIUM 50 MG/1
50 TABLET ORAL
Status: DISCONTINUED | OUTPATIENT
Start: 2023-02-05 | End: 2023-02-06

## 2023-02-04 RX ORDER — CALCIUM CHLORIDE 100 MG/ML
1 INJECTION INTRAVENOUS; INTRAVENTRICULAR ONCE
Status: DISCONTINUED | OUTPATIENT
Start: 2023-02-04 | End: 2023-02-04

## 2023-02-04 RX ORDER — LABETALOL HYDROCHLORIDE 5 MG/ML
20 INJECTION, SOLUTION INTRAVENOUS
Status: DISCONTINUED | OUTPATIENT
Start: 2023-02-04 | End: 2023-02-04

## 2023-02-04 RX ORDER — METOPROLOL TARTRATE 5 MG/5ML
5 INJECTION INTRAVENOUS ONCE
Status: COMPLETED | OUTPATIENT
Start: 2023-02-04 | End: 2023-02-04

## 2023-02-04 RX ORDER — AMLODIPINE BESYLATE 5 MG/1
5 TABLET ORAL
Status: DISCONTINUED | OUTPATIENT
Start: 2023-02-04 | End: 2023-02-07 | Stop reason: HOSPADM

## 2023-02-04 RX ORDER — BISACODYL 10 MG
10 SUPPOSITORY, RECTAL RECTAL DAILY
Status: DISCONTINUED | OUTPATIENT
Start: 2023-02-04 | End: 2023-02-05

## 2023-02-04 RX ORDER — CARVEDILOL 6.25 MG/1
12.5 TABLET ORAL 2 TIMES DAILY WITH MEALS
Status: DISCONTINUED | OUTPATIENT
Start: 2023-02-04 | End: 2023-02-07 | Stop reason: HOSPADM

## 2023-02-04 RX ORDER — HYDRALAZINE HYDROCHLORIDE 20 MG/ML
20 INJECTION INTRAMUSCULAR; INTRAVENOUS EVERY 4 HOURS PRN
Status: DISCONTINUED | OUTPATIENT
Start: 2023-02-04 | End: 2023-02-07 | Stop reason: HOSPADM

## 2023-02-04 RX ORDER — FUROSEMIDE 10 MG/ML
10 INJECTION INTRAMUSCULAR; INTRAVENOUS ONCE
Status: COMPLETED | OUTPATIENT
Start: 2023-02-04 | End: 2023-02-04

## 2023-02-04 RX ADMIN — CARVEDILOL 12.5 MG: 6.25 TABLET, FILM COATED ORAL at 17:23

## 2023-02-04 RX ADMIN — PANTOPRAZOLE SODIUM 40 MG: 40 INJECTION, POWDER, FOR SOLUTION INTRAVENOUS at 05:07

## 2023-02-04 RX ADMIN — ASPIRIN 81 MG: 81 TABLET, COATED ORAL at 08:08

## 2023-02-04 RX ADMIN — BISACODYL 10 MG: 10 SUPPOSITORY RECTAL at 15:41

## 2023-02-04 RX ADMIN — CARVEDILOL 6.25 MG: 6.25 TABLET, FILM COATED ORAL at 08:08

## 2023-02-04 RX ADMIN — IOPAMIDOL 100 ML: 612 INJECTION, SOLUTION INTRAVENOUS at 10:26

## 2023-02-04 RX ADMIN — METOPROLOL TARTRATE 5 MG: 5 INJECTION INTRAVENOUS at 08:41

## 2023-02-04 RX ADMIN — PHENYTOIN SODIUM 500 MG: 100 CAPSULE ORAL at 20:18

## 2023-02-04 RX ADMIN — FUROSEMIDE 10 MG: 10 INJECTION, SOLUTION INTRAMUSCULAR; INTRAVENOUS at 10:49

## 2023-02-04 RX ADMIN — Medication 1 APPLICATION: at 20:19

## 2023-02-04 RX ADMIN — ATORVASTATIN CALCIUM 10 MG: 10 TABLET, FILM COATED ORAL at 08:08

## 2023-02-04 RX ADMIN — LABETALOL HYDROCHLORIDE 10 MG: 5 INJECTION, SOLUTION INTRAVENOUS at 13:39

## 2023-02-04 RX ADMIN — HYDROCODONE BITARTRATE AND ACETAMINOPHEN 1 TABLET: 5; 325 TABLET ORAL at 15:41

## 2023-02-04 RX ADMIN — ENOXAPARIN SODIUM 40 MG: 100 INJECTION SUBCUTANEOUS at 08:07

## 2023-02-04 RX ADMIN — ACETAMINOPHEN 650 MG: 325 TABLET, FILM COATED ORAL at 05:07

## 2023-02-04 RX ADMIN — LABETALOL HYDROCHLORIDE 10 MG: 5 INJECTION, SOLUTION INTRAVENOUS at 15:49

## 2023-02-04 RX ADMIN — Medication 1 APPLICATION: at 08:08

## 2023-02-04 RX ADMIN — ACETAMINOPHEN 650 MG: 325 TABLET, FILM COATED ORAL at 22:37

## 2023-02-04 RX ADMIN — LOSARTAN POTASSIUM 25 MG: 50 TABLET, FILM COATED ORAL at 08:08

## 2023-02-04 RX ADMIN — CALCIUM CHLORIDE 1 G: 100 INJECTION INTRAVENOUS; INTRAVENTRICULAR at 10:50

## 2023-02-04 RX ADMIN — CLOPIDOGREL 75 MG: 75 TABLET, FILM COATED ORAL at 17:21

## 2023-02-04 RX ADMIN — AMLODIPINE BESYLATE 5 MG: 5 TABLET ORAL at 12:08

## 2023-02-04 RX ADMIN — FLUOXETINE HYDROCHLORIDE 40 MG: 20 CAPSULE ORAL at 08:08

## 2023-02-04 RX ADMIN — Medication 10 ML: at 20:19

## 2023-02-04 RX ADMIN — ACETAMINOPHEN 650 MG: 325 TABLET, FILM COATED ORAL at 12:08

## 2023-02-04 RX ADMIN — ACETAMINOPHEN 650 MG: 325 TABLET, FILM COATED ORAL at 17:21

## 2023-02-04 RX ADMIN — HYDROCODONE BITARTRATE AND ACETAMINOPHEN 1 TABLET: 5; 325 TABLET ORAL at 08:07

## 2023-02-04 RX ADMIN — HYDROCODONE BITARTRATE AND ACETAMINOPHEN 1 TABLET: 5; 325 TABLET ORAL at 02:02

## 2023-02-04 NOTE — PLAN OF CARE
Goal Outcome Evaluation:  Plan of Care Reviewed With: patient           Outcome Evaluation: OT eval completed. Pt presents alert and oriented x4, sitting up in chair upon entry to room. She reports at baseline being independent with all adls and mobility. Today she was able to complete sit <> stand t/f from chair with CGA/Min A. She amb 200 ft around unit with Min A but demonstrated decreased balance, multiple lateral LOB x3 requiring Min A to correct. All vital signs within therapeutic range with activity. Pt left sitting in chair at end of session. She would benefit from skilled OT services to address decreased balance and tolerance for activity to ensure return to PLOF prior to returning home. Anticipate d/c home with assist when medically able.

## 2023-02-04 NOTE — PLAN OF CARE
Goal Outcome Evaluation:           Progress: improving   Pt c/o pain PRNs given (see MAR). This AM pt went into SVT, MD aware meds given, vagal maneuvers performed. SVT lasted ~1hr. Currently NSR 60-80 w. PAC's. -150, PRN's added per MD order. SBP goal <140. Pt walked 200ft around unit 3x times, and was up in chair most of day. 1x bowel movement. Urine output good.

## 2023-02-04 NOTE — PROGRESS NOTES
"Patient name: Shilpa Zhang  Patient : 1965  VISIT # [id number]  MR #[id number]    Procedure:Procedure(s):  THORACIC AORTIC ANEURYSM REPAIR USING ENDOGRAFT  BRACHIAL ARTERY EXPOSURE FOR LASER FENESTRATION OF THORACIC ENDOGRAFT FOR STENT PLACEMENT  THORACIC AORTIC ANEURYSM REPAIR  LUMBAR DRAIN INSERTION EXTERNAL  Procedure Date:2023  POD:2 Days Post-Op    Subjective   Abdominal pain/nausea    Afebrile.  On room air.  Sitting up in the bed.  She reports feeling much better.  Blood pressure still marginal.  Cleared to ambulate from vascular perspective.  Calcium 8.2.  No x-ray for review.  No bowel movement.       Telemetry: Sinus rhythm, 70  IV gtts: None       Objective     Visit Vitals  /61   Pulse 78   Temp 98.3 °F (36.8 °C) (Oral)   Resp 16   Ht 167.6 cm (66\")   Wt 90.2 kg (198 lb 12.8 oz)   SpO2 93%   BMI 32.09 kg/m²       Intake/Output Summary (Last 24 hours) at 2023 0845  Last data filed at 2023 0400  Gross per 24 hour   Intake 1175.93 ml   Output 1540 ml   Net -364.07 ml     LABS:    CBC  WBC   Date/Time Value Ref Range Status   2023 12.61 (H) 3.40 - 10.80 10*3/mm3 Final     RBC   Date/Time Value Ref Range Status   2023 3.79 3.77 - 5.28 10*6/mm3 Final     Hemoglobin   Date/Time Value Ref Range Status   2023 11.5 (L) 12.0 - 15.9 g/dL Final     Hematocrit   Date/Time Value Ref Range Status   2023 36.0 34.0 - 46.6 % Final     MCH   Date/Time Value Ref Range Status   2023 30.3 26.6 - 33.0 pg Final     MCHC   Date/Time Value Ref Range Status   2023 31.9 31.5 - 35.7 g/dL Final     RDW   Date/Time Value Ref Range Status   2023 13.9 12.3 - 15.4 % Final     RDW-SD   Date/Time Value Ref Range Status   2023 49.0 37.0 - 54.0 fl Final     MPV   Date/Time Value Ref Range Status   2023 11.6 6.0 - 12.0 fL Final     Platelets   Date/Time Value Ref Range Status   2023 142 140 - 450 10*3/mm3 " Final       BMP  Glucose   Date/Time Value Ref Range Status   02/04/2023 0327 104 (H) 65 - 99 mg/dL Final     BUN   Date/Time Value Ref Range Status   02/04/2023 0327 8 6 - 20 mg/dL Final     Creatinine   Date/Time Value Ref Range Status   02/04/2023 0327 0.33 (L) 0.57 - 1.00 mg/dL Final     Sodium   Date/Time Value Ref Range Status   02/04/2023 0327 139 136 - 145 mmol/L Final     Potassium   Date/Time Value Ref Range Status   02/04/2023 0327 3.6 3.5 - 5.2 mmol/L Final     Comment:     Slight hemolysis detected by analyzer. Results may be affected.     Chloride   Date/Time Value Ref Range Status   02/04/2023 0327 104 98 - 107 mmol/L Final     CO2   Date/Time Value Ref Range Status   02/04/2023 0327 24.0 22.0 - 29.0 mmol/L Final     Calcium   Date/Time Value Ref Range Status   02/04/2023 0327 8.2 (L) 8.6 - 10.5 mg/dL Final     BUN/Creatinine Ratio   Date/Time Value Ref Range Status   02/04/2023 0327 24.2 7.0 - 25.0 Final     Anion Gap   Date/Time Value Ref Range Status   02/04/2023 0327 11.0 5.0 - 15.0 mmol/L Final     eGFR   Date/Time Value Ref Range Status   02/04/2023 0327 121.1 >60.0 mL/min/1.73 Final   IMAGES:       Imaging Results (Last 24 Hours)     ** No results found for the last 24 hours. **        My image interpretation: None    Physical Exam:  General: Alert, oriented. No apparent distress.  Obese.    Cardiovascular: Regular rate and rhythm without murmur, rubs, or gallops.    Pulmonary: Clear to auscultation bilaterally without wheezing, rubs, or rales.  Abdomen: Soft, nondistended, and nontender.  Extremities: Warm, moves all extremities. No edema. Bilateral groin sites C/D/I.    Neurologic:  Grossly intact with no focal deficits.       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  Wean Cardene as tolerated  Losartan increased to  50 mg daily  Increase Coreg to 12.5 mg twice daily  Lasix 10 mg x 1 dose  Calcium chloride 1 g x 1 dose   Encourage ambulation, PT/OT consult today  CT chest abdomen and pelvis ordered for today for postop follow-up  Bowel regimen today  Discussed with patient and nursing.      Lexy Dexter, APRN  02/04/23  08:45 CST

## 2023-02-04 NOTE — PLAN OF CARE
Goal Outcome Evaluation:      Pt A&Ox4. SBP remained in 140's throughout the shift. HR 70's-80's. Afebrile throughout shift. Pt complained of discomfort from laying in bed, requested a norco at 0200. Adequate UO, up to bsc once with 2 nurse assist. VSS will continue to monitor.

## 2023-02-04 NOTE — THERAPY EVALUATION
Patient Name: Shilpa Zhang  : 1965    MRN: [id number]                              Today's Date: 2023       Admit Date: 2023    Visit Dx:     ICD-10-CM ICD-9-CM   1. Dissection of descending thoracic aorta  I71.012 441.01   2. Decreased activities of daily living (ADL)  Z78.9 V49.89     Patient Active Problem List   Diagnosis   • Dissection of descending thoracic aorta   • Nausea & vomiting   • Aortic dissection (HCC)     History reviewed. No pertinent past medical history.  Past Surgical History:   Procedure Laterality Date   • ABDOMINAL SURGERY        General Information     Row Name 23 1050          OT Time and Intention    Document Type evaluation  Pt s/p thoarcic aorta aneurysm repair using endograft, brachial artery exposure, lumbar drain insertion on . 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  -     Mode of Treatment occupational therapy  -     Row Name 23 1050          General Information    Patient Profile Reviewed yes  -     Prior Level of Function independent:;all household mobility;transfer;ADL's;bed mobility  -     Existing Precautions/Restrictions fall   SBP less than 160  -     Barriers to Rehab medically complex  -     Row Name 23 1050          Living Environment    People in Home alone  -     Row Name 23 1050          Cognition    Orientation Status (Cognition) oriented x 4  -     Row Name 23 1050          Safety Issues, Functional Mobility    Impairments Affecting Function (Mobility) balance;endurance/activity tolerance;strength;pain  -           User Key  (r) = Recorded By, (t) = Taken By, (c) = Cosigned By    Initials Name Provider Type     Chantelle Norman, KATHLEEN/L, CSRS Occupational Therapist                 Mobility/ADL's     Row Name 23 1050          Bed Mobility    Comment, (Bed Mobility) up in chair upon entry to room  -     Row Name 23 1050          Transfers    Transfers sit-stand  transfer;stand-sit transfer  -JJ     Row Name 02/04/23 1050          Sit-Stand Transfer    Sit-Stand [place] (Transfers) contact guard;minimum assist (75% patient effort)  -JJ     Row Name 02/04/23 1050          Stand-Sit Transfer    Stand-Sit [place] (Transfers) contact guard;minimum assist (75% patient effort)  -JJ     Row Name 02/04/23 1050          Functional Mobility    Functional Mobility- Ind. Level minimum assist (75% patient effort)  -     Functional Mobility- Comment amb around unit 200 ft with demonstrated decreased balance with multiple lateral LOB x3 reps requiring Min A to correct.  -JJ     Row Name 02/04/23 1050          Activities of Daily Living    BADL Assessment/Intervention lower body dressing  -JJ     Row Name 02/04/23 1050          Lower Body Dressing Assessment/Training    [place] Level (Lower Body Dressing) minimum assist (75% patient effort)  -     Position (Lower Body Dressing) unsupported sitting  -     Comment, (Lower Body Dressing) adjust B socks  -           User Key  (r) = Recorded By, (t) = Taken By, (c) = Cosigned By    Initials Name Provider Type    Chantelle Savage, KATHLEEN/L, CSRS Occupational Therapist               Obj/Interventions     Row Name 02/04/23 1050          Sensory Assessment (Somatosensory)    Sensory Assessment (Somatosensory) UE sensation intact  -JJ     Row Name 02/04/23 1050          Vision Assessment/Intervention    Visual Impairment/Limitations WFL  -JJ     Row Name 02/04/23 1050          Range of Motion Comprehensive    General Range of Motion bilateral upper extremity ROM WFL  -JJ     Row Name 02/04/23 1050          Strength Comprehensive (MMT)    Comment, General Manual Muscle Testing (MMT) Assessment B UE strength grossly 4+/5  -JJ     Row Name 02/04/23 1050          Balance    Balance Assessment sitting static balance;sitting dynamic balance;standing dynamic balance;standing static balance  -     Static Sitting Balance independent   -JJ     Dynamic Sitting Balance standby assist  -JJ     Position, Sitting Balance unsupported;sitting in chair  -JJ     Static Standing Balance contact guard  -JJ     Dynamic Standing Balance minimal assist  -JJ     Position/Device Used, Standing Balance supported  HHAx1  -JJ           User Key  (r) = Recorded By, (t) = Taken By, (c) = Cosigned By    Initials Name Provider Type    JChantelle Savage, OTR/L, CSRS Occupational Therapist               Goals/Plan     Row Name 02/04/23 1050          Bathing Goal 1 (OT)    Activity/Device (Bathing Goal 1, OT) bathing skills, all  -JJ     [place] Level/Cues Needed (Bathing Goal 1, OT) modified independence  -JJ     Time Frame (Bathing Goal 1, OT) long term goal (LTG);by discharge  -JJ     Progress/Outcomes (Bathing Goal 1, OT) new goal  -JJ     Row Name 02/04/23 1050          Dressing Goal 1 (OT)    Activity/Device (Dressing Goal 1, OT) dressing skills, all  -JJ     [place]oody/Cues Needed (Dressing Goal 1, OT) independent  -JJ     Time Frame (Dressing Goal 1, OT) long term goal (LTG);by discharge  -JJ     Progress/Outcome (Dressing Goal 1, OT) new goal  -     Row Name 02/04/23 1050          Toileting Goal 1 (OT)    Activity/Device (Toileting Goal 1, OT) toileting skills, all  -JJ     [place]y Level/Cues Needed (Toileting Goal 1, OT) independent  -JJ     Time Frame (Toileting Goal 1, OT) long term goal (LTG);by discharge  -JJ     Progress/Outcome (Toileting Goal 1, OT) new goal  -JJ     Row Name 02/04/23 1050          Therapy Assessment/Plan (OT)    Planned Therapy Interventions (OT) activity tolerance training;adaptive equipment training;BADL retraining;functional balance retraining;transfer/mobility retraining;occupation/activity based interventions;patient/caregiver education/training  -JJ           User Key  (r) = Recorded By, (t) = Taken By, (c) = Cosigned By    Initials Name Provider Type    Chantelle Gallegos, OTR/L, CSRS Occupational Therapist                Clinical Impression     Row Name 02/04/23 1050          Pain Assessment    Additional Documentation Pain Scale: FACES Pre/Post-Treatment (Group)  -     Row Name 02/04/23 1050          Pain Scale: FACES Pre/Post-Treatment    Pain: FACES Scale, Pretreatment 4-->hurts little more  -J     Posttreatment Pain Rating 4-->hurts little more  -     Row Name 02/04/23 1050          Plan of Care Review    Plan of Care Reviewed With patient  -     Outcome Evaluation OT eval completed. Pt presents alert and oriented x4, sitting up in chair upon entry to room. She reports at baseline being independent with all adls and mobility. Today she was able to complete sit <> stand t/f from chair with CGA/Min A. She amb 200 ft around unit with Min A but demonstrated decreased balance, multiple lateral LOB x3 requiring Min A to correct. All vital signs within therapeutic range with activity. Pt left sitting in chair at end of session. She would benefit from skilled OT services to address decreased balance and tolerance for activity to ensure return to OF prior to returning home. Anticipate d/c home with assist when medically able.  -     Row Name 02/04/23 1050          Therapy Assessment/Plan (OT)    Rehab Potential (OT) good, to achieve stated therapy goals  -     Criteria for Skilled Therapeutic Interventions Met (OT) yes;skilled treatment is necessary  -     Therapy Frequency (OT) 3 times/wk  -     Predicted Duration of Therapy Intervention (OT) 10 days  -     Row Name 02/04/23 1050          Therapy Plan Review/Discharge Plan (OT)    Anticipated Discharge Disposition (OT) home with assist  -[facility] Name 02/04/23 1050          Positioning and Restraints    Pre-Treatment Position sitting in chair/recliner  -     Post Treatment Position chair  -JJ     In Chair notified nsg;reclined;call light within reach;encouraged to call for assist;with family/caregiver;with nsg  -           User Key  (r) = Recorded By,  (t) = Taken By, (c) = Cosigned By    Initials Name Provider Type    Chantelle Gallegos OTR/L, CSRS Occupational Therapist               Outcome Measures     Row Name 02/04/23 1050          How much help from another is currently needed...    Putting on and taking off regular lower body clothing? 3  -JJ     Bathing (including washing, rinsing, and drying) 3  -JJ     Toileting (which includes using toilet bed pan or urinal) 3  -JJ     Putting on and taking off regular upper body clothing 3  -JJ     Taking care of personal grooming (such as brushing teeth) 4  -JJ     Eating meals 4  -JJ     AM-PAC 6 Clicks Score (OT) 20  -JJ     Row Name 02/04/23 0745          How much help from another person do you currently need...    Turning from your back to your side while in flat bed without using bedrails? 3  -MG     Moving from lying on back to sitting on the side of a flat bed without bedrails? 3  -MG     Moving to and from a bed to a chair (including a wheelchair)? 3  -MG     Standing up from a chair using your arms (e.g., wheelchair, bedside chair)? 3  -MG     Climbing 3-5 steps with a railing? 2  -MG     To walk in hospital room? 3  -MG     AM-PAC 6 Clicks Score (PT) 17  -MG     Highest level of mobility 5 --> Static standing  -MG     Row Name 02/04/23 1050          Functional Assessment    Outcome Measure Options AM-PAC 6 Clicks Daily Activity (OT)  -           User Key  (r) = Recorded By, (t) = Taken By, (c) = Cosigned By    Initials Name Provider Type    Chantelle Gallegos OTR/L, JESS Occupational Therapist    Fabiano Bunn, RN Registered Nurse                Occupational Therapy Education     Title: PT OT SLP Therapies (In Progress)     Topic: Occupational Therapy (In Progress)     Point: ADL training (Done)     Description:   Instruct learner(s) on proper safety adaptation and remediation techniques during self care or transfers.   Instruct in proper use of assistive devices.              Learning  Progress Summary           Patient Acceptance, E, VU by CARLOS at 2/4/2023 1156                   Point: Home exercise program (Not Started)     Description:   Instruct learner(s) on appropriate technique for monitoring, assisting and/or progressing therapeutic exercises/activities.              Learner Progress:  Not documented in this visit.          Point: Precautions (Done)     Description:   Instruct learner(s) on prescribed precautions during self-care and functional transfers.              Learning Progress Summary           Patient Acceptance, E, VU by CARLOS at 2/4/2023 1156                   Point: Body mechanics (Not Started)     Description:   Instruct learner(s) on proper positioning and spine alignment during self-care, functional mobility activities and/or exercises.              Learner Progress:  Not documented in this visit.                      User Key     Initials Effective Dates Name Provider Type Discipline    CARLOS 11/10/21 -  Chantelle Norman OTR/L, SONAI Occupational Therapist OT              OT Recommendation and Plan  Planned Therapy Interventions (OT): activity tolerance training, adaptive equipment training, BADL retraining, functional balance retraining, transfer/mobility retraining, occupation/activity based interventions, patient/caregiver education/training  Therapy Frequency (OT): 3 times/wk  Plan of Care Review  Plan of Care Reviewed With: patient  Outcome Evaluation: OT eval completed. Pt presents alert and oriented x4, sitting up in chair upon entry to room. She reports at baseline being independent with all adls and mobility. Today she was able to complete sit <> stand t/f from chair with CGA/Min A. She amb 200 ft around unit with Min A but demonstrated decreased balance, multiple lateral LOB x3 requiring Min A to correct. All vital signs within therapeutic range with activity. Pt left sitting in chair at end of session. She would benefit from skilled OT services to address decreased  balance and tolerance for activity to ensure return to PLOF prior to returning home. Anticipate d/c home with assist when medically able.     Time Calculation:    Time Calculation- OT     Row Name 02/04/23 1050             Time Calculation- OT    OT Start Time 1050  -      OT Stop Time 1130  -      OT Time Calculation (min) 40 min  -      OT Received On 02/04/23  -      OT Goal Re-Cert Due Date 02/14/23  -            User Key  (r) = Recorded By, (t) = Taken By, (c) = Cosigned By    Initials Name Provider Type    Chantelle Gallegos OTR/L, CSRS Occupational Therapist              Therapy Charges for Today     Code Description Service Date Service Provider Modifiers Qty    11748162566 HC OT EVAL MOD COMPLEXITY 3 2/4/2023 Chantelle Norman OTR/L, CSRS GO 1               Chantelle Norman, OTR/L, CSRS  2/4/2023

## 2023-02-05 LAB
ANION GAP SERPL CALCULATED.3IONS-SCNC: 9 MMOL/L (ref 5–15)
BUN SERPL-MCNC: 13 MG/DL (ref 6–20)
BUN/CREAT SERPL: 34.2 (ref 7–25)
CALCIUM SPEC-SCNC: 8.6 MG/DL (ref 8.6–10.5)
CHLORIDE SERPL-SCNC: 104 MMOL/L (ref 98–107)
CO2 SERPL-SCNC: 28 MMOL/L (ref 22–29)
CREAT SERPL-MCNC: 0.38 MG/DL (ref 0.57–1)
DEPRECATED RDW RBC AUTO: 49.9 FL (ref 37–54)
EGFRCR SERPLBLD CKD-EPI 2021: 117 ML/MIN/1.73
ERYTHROCYTE [DISTWIDTH] IN BLOOD BY AUTOMATED COUNT: 14.3 % (ref 12.3–15.4)
GLUCOSE SERPL-MCNC: 110 MG/DL (ref 65–99)
HCT VFR BLD AUTO: 35.6 % (ref 34–46.6)
HGB BLD-MCNC: 11.4 G/DL (ref 12–15.9)
MCH RBC QN AUTO: 30.3 PG (ref 26.6–33)
MCHC RBC AUTO-ENTMCNC: 32 G/DL (ref 31.5–35.7)
MCV RBC AUTO: 94.7 FL (ref 79–97)
PLATELET # BLD AUTO: 172 10*3/MM3 (ref 140–450)
PMV BLD AUTO: 12.2 FL (ref 6–12)
POTASSIUM SERPL-SCNC: 3.6 MMOL/L (ref 3.5–5.2)
RBC # BLD AUTO: 3.76 10*6/MM3 (ref 3.77–5.28)
SODIUM SERPL-SCNC: 141 MMOL/L (ref 136–145)
WBC NRBC COR # BLD: 12.07 10*3/MM3 (ref 3.4–10.8)

## 2023-02-05 PROCEDURE — 97116 GAIT TRAINING THERAPY: CPT

## 2023-02-05 PROCEDURE — 25010000002 ENOXAPARIN PER 10 MG: Performed by: SURGERY

## 2023-02-05 PROCEDURE — 25010000002 FUROSEMIDE PER 20 MG: Performed by: NURSE PRACTITIONER

## 2023-02-05 PROCEDURE — 85027 COMPLETE CBC AUTOMATED: CPT | Performed by: SURGERY

## 2023-02-05 PROCEDURE — 80048 BASIC METABOLIC PNL TOTAL CA: CPT | Performed by: SURGERY

## 2023-02-05 PROCEDURE — 25010000002 ONDANSETRON PER 1 MG

## 2023-02-05 RX ORDER — POTASSIUM CHLORIDE 750 MG/1
20 CAPSULE, EXTENDED RELEASE ORAL DAILY
Status: DISCONTINUED | OUTPATIENT
Start: 2023-02-05 | End: 2023-02-07

## 2023-02-05 RX ORDER — ONDANSETRON 2 MG/ML
INJECTION INTRAMUSCULAR; INTRAVENOUS
Status: COMPLETED
Start: 2023-02-05 | End: 2023-02-05

## 2023-02-05 RX ORDER — ONDANSETRON 2 MG/ML
4 INJECTION INTRAMUSCULAR; INTRAVENOUS ONCE
Status: COMPLETED | OUTPATIENT
Start: 2023-02-05 | End: 2023-02-05

## 2023-02-05 RX ORDER — METOPROLOL TARTRATE 5 MG/5ML
5 INJECTION INTRAVENOUS ONCE
Status: DISCONTINUED | OUTPATIENT
Start: 2023-02-05 | End: 2023-02-05

## 2023-02-05 RX ORDER — METOPROLOL TARTRATE 5 MG/5ML
5 INJECTION INTRAVENOUS ONCE
Status: COMPLETED | OUTPATIENT
Start: 2023-02-05 | End: 2023-02-05

## 2023-02-05 RX ORDER — FUROSEMIDE 10 MG/ML
20 INJECTION INTRAMUSCULAR; INTRAVENOUS DAILY
Status: DISCONTINUED | OUTPATIENT
Start: 2023-02-05 | End: 2023-02-07 | Stop reason: HOSPADM

## 2023-02-05 RX ADMIN — ACETAMINOPHEN 650 MG: 325 TABLET, FILM COATED ORAL at 21:39

## 2023-02-05 RX ADMIN — POTASSIUM CHLORIDE 20 MEQ: 10 CAPSULE, COATED, EXTENDED RELEASE ORAL at 11:32

## 2023-02-05 RX ADMIN — PANTOPRAZOLE SODIUM 40 MG: 40 INJECTION, POWDER, FOR SOLUTION INTRAVENOUS at 05:48

## 2023-02-05 RX ADMIN — CARVEDILOL 12.5 MG: 6.25 TABLET, FILM COATED ORAL at 07:17

## 2023-02-05 RX ADMIN — ACETAMINOPHEN 650 MG: 325 TABLET, FILM COATED ORAL at 17:00

## 2023-02-05 RX ADMIN — AMLODIPINE BESYLATE 5 MG: 5 TABLET ORAL at 08:05

## 2023-02-05 RX ADMIN — ATORVASTATIN CALCIUM 10 MG: 10 TABLET, FILM COATED ORAL at 08:05

## 2023-02-05 RX ADMIN — ACETAMINOPHEN 650 MG: 325 TABLET, FILM COATED ORAL at 12:49

## 2023-02-05 RX ADMIN — ACETAMINOPHEN 650 MG: 325 TABLET, FILM COATED ORAL at 05:48

## 2023-02-05 RX ADMIN — CLOPIDOGREL 75 MG: 75 TABLET, FILM COATED ORAL at 17:00

## 2023-02-05 RX ADMIN — FLUOXETINE HYDROCHLORIDE 40 MG: 20 CAPSULE ORAL at 08:05

## 2023-02-05 RX ADMIN — FUROSEMIDE 20 MG: 10 INJECTION, SOLUTION INTRAMUSCULAR; INTRAVENOUS at 11:32

## 2023-02-05 RX ADMIN — Medication 10 ML: at 08:07

## 2023-02-05 RX ADMIN — LOSARTAN POTASSIUM 50 MG: 50 TABLET, FILM COATED ORAL at 08:05

## 2023-02-05 RX ADMIN — ONDANSETRON 4 MG: 2 INJECTION INTRAMUSCULAR; INTRAVENOUS at 02:33

## 2023-02-05 RX ADMIN — Medication 10 ML: at 21:40

## 2023-02-05 RX ADMIN — ENOXAPARIN SODIUM 40 MG: 100 INJECTION SUBCUTANEOUS at 08:06

## 2023-02-05 RX ADMIN — HYDROCODONE BITARTRATE AND ACETAMINOPHEN 1 TABLET: 5; 325 TABLET ORAL at 03:41

## 2023-02-05 RX ADMIN — METOPROLOL TARTRATE 5 MG: 5 INJECTION INTRAVENOUS at 00:13

## 2023-02-05 RX ADMIN — PHENYTOIN SODIUM 500 MG: 100 CAPSULE ORAL at 21:39

## 2023-02-05 RX ADMIN — ASPIRIN 81 MG: 81 TABLET, COATED ORAL at 08:05

## 2023-02-05 RX ADMIN — CARVEDILOL 12.5 MG: 6.25 TABLET, FILM COATED ORAL at 17:00

## 2023-02-05 NOTE — PLAN OF CARE
Goal Outcome Evaluation:  Plan of Care Reviewed With: patient, mother           Outcome Evaluation: PT eval completed.  Pt sleeping upon therapy entering room, however roused easily.  Oriented X 4.  Agreeable to therapy.  Pt performed warm up ex w/ U/LEs X 10 reps each.  Tfers sit to stand w/ min to CGA from chair and BSC.  Pt performed gait activity ~ 200 ft w/ CGA, noted decrease step length and decrease heel strike w/ occassional scissoring of R foot over L.  No LOB noted.  Pt fatigues easily reporting she is just tired.  Pt returned to bed w/ SBA.  Performed cool down ex w/ U/LEs X 10 reps.  Pt will benefit from continued PT services to improve strengh, activity tolerance, balance, safety awareness, knowledge of energy conservation, warm up/cool down ex and to improve I w/ functional mobility reducing fall risk.  Recommend home w/ assist and HH.  Pt's mother plans to stay w/ her at discharge.  Will follow for progress and needs.

## 2023-02-05 NOTE — THERAPY TREATMENT NOTE
Acute Care - Physical Therapy Treatment Note  [facility]     Patient Name: Shilpa Zhang  : 1965  MRN: [id number]  Today's Date: 2023      Visit Dx:     ICD-10-CM ICD-9-CM   1. Dissection of descending thoracic aorta  I71.012 441.01   2. Decreased activities of daily living (ADL)  Z78.9 V49.89   3. Impaired functional mobility and activity tolerance  Z74.09 V49.89     Patient Active Problem List   Diagnosis   • Dissection of descending thoracic aorta   • Nausea & vomiting   • Aortic dissection (HCC)     History reviewed. No pertinent past medical history.  Past Surgical History:   Procedure Laterality Date   • ABDOMINAL SURGERY       PT Assessment (last 12 hours)     PT Evaluation and Treatment     Row Name 23 0745          Physical Therapy Time and Intention    Subjective Information complains of;dizziness  and feeling faint  -     Document Type therapy note (daily note)  -     Mode of Treatment physical therapy  -     Comment Pt. does not have sternal precautions  -     Row Name 23 0745          General Information    Existing Precautions/Restrictions fall;cardiac  -     Row Name 23 0745          Pain    Pretreatment Pain Rating 4/10  -     Posttreatment Pain Rating 4/10  -     Pain Location - back  -     Pain Intervention(s) Repositioned;Ambulation/increased activity  -     Row Name 2345          Bed Mobility    Comment, (Bed Mobility) up in chair  -     Row Name 2345          Transfers    Comment, (Transfers) Pt. c/o dizziness and feeling faint upon standing from [facility]. Pt. required sitting rest in chair prior to gait. Pt. took encouragement from [facility] to get up to walk.  -     Row Name 2345          Sit-Stand Transfer    Sit-Stand [place] (Transfers) contact guard  -     Row Name 2345          Stand-Sit Transfer    Stand-Sit [place] (Transfers) contact guard  -     Row Name 2345          Toilet Transfer     Type (Toilet Transfer) sit-stand;stand pivot/stand step  -MF     [place] Level (Toilet Transfer) contact guard  -     Assistive Device (Toilet Transfer) commode, bedside without drop arms  -MF     Row Name 02/05/23 0745          Gait/Stairs (Locomotion)    [place] Level (Gait) contact guard;2 person assist  -MF     Assistive Device (Gait) --  HHA  -MF     Distance in Feet (Gait) 200  -MF     Deviations/Abnormal Patterns (Gait) stride length decreased;xiang decreased  -MF     Row Name             Wound 02/02/23 1519 Right anterior groin Incision    Wound - Properties Group Placement Date: 02/02/23  -AC Placement Time: 1519  -AC Present on Hospital Admission: N  -AC Side: Right  -AC Orientation: anterior  -AC Location: groin  -AC Primary Wound Type: Incision  -AC Additional Comments: PERCLOSE X2 2X2 TEGADERM  -AC    Retired Wound - Properties Group Placement Date: 02/02/23  -AC Placement Time: 1519  -AC Present on Hospital Admission: N  -AC Side: Right  -AC Orientation: anterior  -AC Location: groin  -AC Primary Wound Type: Incision  -AC Additional Comments: PERCLOSE X2 2X2 TEGADERM  -AC    Retired Wound - Properties Group Date first assessed: 02/02/23  -AC Time first assessed: 1519  -AC Present on Hospital Admission: N  -AC Side: Right  -AC Location: groin  -AC Primary Wound Type: Incision  -AC Additional Comments: PERCLOSE X2 2X2 TEGADERM  -AC    Row Name             Wound 02/02/23 1519 Left anterior groin Incision    Wound - Properties Group Placement Date: 02/02/23  -AC Placement Time: 1519  -AC Present on Hospital Admission: N  -AC Side: Left  -AC Orientation: anterior  -AC Location: groin  -AC Primary Wound Type: Incision  -AC Additional Comments: MYNX 2X2 TEGADERM  -AC    Retired Wound - Properties Group Placement Date: 02/02/23  -AC Placement Time: 1519  -AC Present on Hospital Admission: N  -AC Side: Left  -AC Orientation: anterior  -AC Location: groin  -AC Primary Wound Type: Incision  -AC  Additional Comments: MYNX 2X2 TEGADERM  -AC    Retired Wound - Properties Group Date first assessed: 02/02/23  -AC Time first assessed: 1519  -AC Present on Hospital Admission: N  -AC Side: Left  -AC Location: groin  -AC Primary Wound Type: Incision  -AC Additional Comments: MYNX 2X2 TEGADERM  -AC    Row Name             Wound 02/02/23 1520 Left distal arm Incision    Wound - Properties Group Placement Date: 02/02/23  -AC Placement Time: 1520  -AC Present on Hospital Admission: N  -AC Side: Left  -AC Orientation: distal  -AC Location: arm  -AC Primary Wound Type: Incision  -AC Additional Comments: STERISTRIPS 4X4 TEGADERM  -AC    Retired Wound - Properties Group Placement Date: 02/02/23  -AC Placement Time: 1520  -AC Present on Hospital Admission: N  -AC Side: Left  -AC Orientation: distal  -AC Location: arm  -AC Primary Wound Type: Incision  -AC Additional Comments: STERISTRIPS 4X4 TEGADERM  -AC    Retired Wound - Properties Group Date first assessed: 02/02/23  -AC Time first assessed: 1520  -AC Present on Hospital Admission: N  -AC Side: Left  -AC Location: arm  -AC Primary Wound Type: Incision  -AC Additional Comments: STERISTRIPS 4X4 TEGADERM  -AC    Row Name 02/05/23 0745          Plan of Care Review    Plan of Care Reviewed With patient  -MF     Progress no change  -MF     Outcome Evaluation Pt. agreeable to therapy, but needed encouragement to actually participate. She c/o dizziness and feeling faint upon standing and needed sitting rest prior to walking.  Pt. was CGA for transfers and gait. She walked 200' with HHA. Will continue to work with pt on progressive activity.  -     Row Name 02/05/23 0745          Positioning and Restraints    Pre-Treatment Position bedside commode  -MF     Post Treatment Position chair  -MF     In Chair reclined;call light within reach;encouraged to call for assist;with family/caregiver;notified [facility]  -           User Key  (r) = Recorded By, (t) = Taken By, (c) = Cosigned By     Initials Name Provider Type    AC Francesca Barlow, RN Registered Nurse    Noemi Pelayo, JULI Physical Therapist Assistant                Physical Therapy Education     Title: PT OT SLP Therapies (In Progress)     Topic: Physical Therapy (Done)     Point: Mobility training (Done)     Learning Progress Summary           Patient Acceptance, E,TB,D, VU,DU,NR by  at 2/4/2023 1828    Comment: education re: purpose of PT/importance of activity, for safety/falls prevention, improved deep breathing, paced activity, and improved tech w/ tfers   Family Acceptance, E,TB,D, VU,DU,NR by  at 2/4/2023 1828    Comment: education re: purpose of PT/importance of activity, for safety/falls prevention, improved deep breathing, paced activity, and improved tech w/ tfers                   Point: Home exercise program (Done)     Learning Progress Summary           Patient Acceptance, E,TB,D, VU,DU,NR by  at 2/4/2023 1828    Comment: education re: purpose of PT/importance of activity, for safety/falls prevention, improved deep breathing, paced activity, and improved tech w/ tfers   Family Acceptance, E,TB,D, VU,DU,NR by  at 2/4/2023 1828    Comment: education re: purpose of PT/importance of activity, for safety/falls prevention, improved deep breathing, paced activity, and improved tech w/ tfers                   Point: Precautions (Done)     Learning Progress Summary           Patient Acceptance, E,TB,D, VU,DU,NR by  at 2/4/2023 1828    Comment: education re: purpose of PT/importance of activity, for safety/falls prevention, improved deep breathing, paced activity, and improved tech w/ tfers   Family Acceptance, E,TB,D, VU,DU,NR by  at 2/4/2023 1828    Comment: education re: purpose of PT/importance of activity, for safety/falls prevention, improved deep breathing, paced activity, and improved tech w/ tfers                               User Key     Initials Effective Dates Name Provider Type Discipline      08/02/18 -  Nikki Olivares, PT Physical Therapist PT              PT Recommendation and Plan     Plan of Care Reviewed With: patient  Progress: no change  Outcome Evaluation: Pt. agreeable to therapy, but needed encouragement to actually participate. She c/o dizziness and feeling faint upon standing and needed sitting rest prior to walking.  Pt. was CGA for transfers and gait. She walked 200' with HHA. Will continue to work with pt on progressive activity.   Outcome Measures     Row Name 02/05/23 0745             How much help from another person do you currently need...    Turning from your back to your side while in flat bed without using bedrails? 3  -MF      Moving from lying on back to sitting on the side of a flat bed without bedrails? 3  -MF      Moving to and from a bed to a chair (including a wheelchair)? 3  -MF      Standing up from a chair using your arms (e.g., wheelchair, bedside chair)? 3  -MF      Climbing 3-5 steps with a railing? 3  -MF      To walk in hospital room? 3  -MF      AM-PAC 6 Clicks Score (PT) 18  -MF         Functional Assessment    Outcome Measure Options AM-PAC 6 Clicks Basic Mobility (PT)  -MF            User Key  (r) = Recorded By, (t) = Taken By, (c) = Cosigned By    Initials Name Provider Type    Noemi Pelayo PTA Physical Therapist Assistant                 Time Calculation:    PT Charges     Row Name 02/05/23 0810             Time Calculation    Start Time 0745  -MF      Stop Time 0809  -MF      Time Calculation (min) 24 min  -MF      PT Received On 02/05/23  -MF         Time Calculation- PT    Total Timed Code Minutes- PT 24 minute(s)  -MF         Timed Charges    97860 - Gait Training Minutes  24  -MF         Total Minutes    Timed Charges Total Minutes 24  -MF       Total Minutes 24  -MF            User Key  (r) = Recorded By, (t) = Taken By, (c) = Cosigned By    Initials Name Provider Type    Noemi Pelayo PTA Physical Therapist Assistant               Therapy Charges for Today     Code Description Service Date Service Provider Modifiers Qty    03242188778 HC GAIT TRAINING EA 15 MIN 2/5/2023 Noemi Gatica, JULI GP 2          PT G-Codes  Outcome Measure Options: AM-PAC 6 Clicks Basic Mobility (PT)  AM-PAC 6 Clicks Score (PT): 18  AM-PAC 6 Clicks Score (OT): 20    Noemi Gatica PTA  2/5/2023

## 2023-02-05 NOTE — THERAPY TREATMENT NOTE
Acute Care - Physical Therapy Treatment Note  [facility]     Patient Name: Shilpa Zhang  : 1965  MRN: [id number]  Today's Date: 2023      Visit Dx:     ICD-10-CM ICD-9-CM   1. Dissection of descending thoracic aorta  I71.012 441.01   2. Decreased activities of daily living (ADL)  Z78.9 V49.89   3. Impaired functional mobility and activity tolerance  Z74.09 V49.89     Patient Active Problem List   Diagnosis   • Dissection of descending thoracic aorta   • Nausea & vomiting   • Aortic dissection (HCC)     History reviewed. No pertinent past medical history.  Past Surgical History:   Procedure Laterality Date   • ABDOMINAL SURGERY       PT Assessment (last 12 hours)     PT Evaluation and Treatment     Row Name 23 1306 23 0745       Physical Therapy Time and Intention    Subjective Information complains of;dizziness;nausea/vomiting  - complains of;dizziness  and feeling faint  -    Document Type therapy note (daily note)  - therapy note (daily note)  -    Mode of Treatment physical therapy  - physical therapy  -    Comment -- Pt. does not have sternal precautions  -    Row Name 23 1306 23 0745       General Information    Existing Precautions/Restrictions fall;cardiac  - fall;cardiac  -    Row Name 23 130 23 0745       Pain    Pretreatment Pain Rating 0/10 - no pain  - 4/10  -    Posttreatment Pain Rating 0/10 - no pain  - 4/10  -    Pain Location - -- back  -    Pain Intervention(s) -- Repositioned;Ambulation/increased activity  -    Row Name 23 1306 23 0745       Bed Mobility    Comment, (Bed Mobility) up  in chair  - up in chair  -    Row Name 23 0745          Transfers    Comment, (Transfers) Pt. c/o dizziness and feeling faint upon standing from [facility]. Pt. required sitting rest in chair prior to gait. Pt. took encouragement from [facility] to get up to walk.  -     Row Name 23 1306 23 0745       Sit-Stand  Transfer    Sit-Stand Graham (Transfers) contact guard  -MF contact guard  -MF    Row Name 02/05/23 1306 02/05/23 0745       Stand-Sit Transfer    Stand-Sit Graham (Transfers) contact guard  -MF contact guard  -MF    Row Name 02/05/23 1306 02/05/23 0745       Toilet Transfer    Type (Toilet Transfer) stand pivot/stand step  -MF sit-stand;stand pivot/stand step  -MF    Graham Level (Toilet Transfer) contact guard  -MF contact guard  -MF    Assistive Device (Toilet Transfer) commode, bedside without drop arms  -MF commode, bedside without drop arms  -MF    Row Name 02/05/23 1306 02/05/23 0745       Gait/Stairs (Locomotion)    Graham Level (Gait) verbal cues;contact guard  -MF contact guard;2 person assist  -MF    Assistive Device (Gait) --  HHA  -MF --  HHA  -MF    Distance in Feet (Gait) 200  2 brief standing rests  - 200  -MF    Deviations/Abnormal Patterns (Gait) stride length decreased;xiang decreased  - stride length decreased;xiang decreased  -MF    Row Name 02/05/23 1306          Motor Skills    Comments, Therapeutic Exercise cardiac warm ups  -     Row Name             Wound 02/02/23 1519 Right anterior groin Incision    Wound - Properties Group Placement Date: 02/02/23  -AC Placement Time: 1519  -AC Present on Hospital Admission: N  -AC Side: Right  -AC Orientation: anterior  -AC Location: groin  -AC Primary Wound Type: Incision  -AC Additional Comments: PERCLOSE X2 2X2 TEGADERM  -AC    Retired Wound - Properties Group Placement Date: 02/02/23  -AC Placement Time: 1519  -AC Present on Hospital Admission: N  -AC Side: Right  -AC Orientation: anterior  -AC Location: groin  -AC Primary Wound Type: Incision  -AC Additional Comments: PERCLOSE X2 2X2 TEGADERM  -AC    Retired Wound - Properties Group Date first assessed: 02/02/23  -AC Time first assessed: 1519  -AC Present on Hospital Admission: N  -AC Side: Right  -AC Location: groin  -AC Primary Wound Type: Incision  -AC  Additional Comments: PERCLOSE X2 2X2 TEGADERM  -AC    Row Name             Wound 02/02/23 1519 Left anterior groin Incision    Wound - Properties Group Placement Date: 02/02/23  -AC Placement Time: 1519  -AC Present on Hospital Admission: N  -AC Side: Left  -AC Orientation: anterior  -AC Location: groin  -AC Primary Wound Type: Incision  -AC Additional Comments: MYNX 2X2 TEGADERM  -AC    Retired Wound - Properties Group Placement Date: 02/02/23  -AC Placement Time: 1519  -AC Present on Hospital Admission: N  -AC Side: Left  -AC Orientation: anterior  -AC Location: groin  -AC Primary Wound Type: Incision  -AC Additional Comments: MYNX 2X2 TEGADERM  -AC    Retired Wound - Properties Group Date first assessed: 02/02/23  -AC Time first assessed: 1519  -AC Present on Hospital Admission: N  -AC Side: Left  -AC Location: groin  -AC Primary Wound Type: Incision  -AC Additional Comments: MYNX 2X2 TEGADERM  -AC    Row Name             Wound 02/02/23 1520 Left distal arm Incision    Wound - Properties Group Placement Date: 02/02/23  -AC Placement Time: 1520  -AC Present on Hospital Admission: N  -AC Side: Left  -AC Orientation: distal  -AC Location: arm  -AC Primary Wound Type: Incision  -AC Additional Comments: STERISTRIPS 4X4 TEGADERM  -AC    Retired Wound - Properties Group Placement Date: 02/02/23  -AC Placement Time: 1520  -AC Present on Hospital Admission: N  -AC Side: Left  -AC Orientation: distal  -AC Location: arm  -AC Primary Wound Type: Incision  -AC Additional Comments: STERISTRIPS 4X4 TEGADERM  -AC    Retired Wound - Properties Group Date first assessed: 02/02/23  -AC Time first assessed: 1520  -AC Present on Hospital Admission: N  -AC Side: Left  -AC Location: arm  -AC Primary Wound Type: Incision  -AC Additional Comments: STERISTRIPS 4X4 TEGADERM  -AC    Row Name 02/05/23 0745          Plan of Care Review    Plan of Care Reviewed With patient  -MF     Progress no change  -MF     Outcome Evaluation Pt. agreeable  to therapy, but needed encouragement to actually participate. She c/o dizziness and feeling faint upon standing and needed sitting rest prior to walking.  Pt. was CGA for transfers and gait. She walked 200' with HHA. Will continue to work with pt on progressive activity.  -     Row Name 02/05/23 1306          Vital Signs    Pre Systolic BP Rehab 116  -MF     Pre Treatment Diastolic BP 49  -MF     Post Systolic BP Rehab 140  -     Post Treatment Diastolic BP 63  -     Row Name 02/05/23 1306 02/05/23 0745       Positioning and Restraints    Pre-Treatment Position sitting in chair/recliner  - bedside commode  -    Post Treatment Position chair  - chair  -    In Chair notified nsg;reclined;call light within reach;encouraged to call for assist;with family/caregiver  - reclined;call light within reach;encouraged to call for assist;with family/caregiver;notified nsg  -          User Key  (r) = Recorded By, (t) = Taken By, (c) = Cosigned By    Initials Name Provider Type    Francesca Valdez RN Registered Nurse    Noemi Pelayo, PTA Physical Therapist Assistant                Physical Therapy Education     Title: PT OT SLP Therapies (In Progress)     Topic: Physical Therapy (Done)     Point: Mobility training (Done)     Learning Progress Summary           Patient Acceptance, E,TB,ANUEL, CONCHITA VILLAFANA,NR by  at 2/4/2023 1828    Comment: education re: purpose of PT/importance of activity, for safety/falls prevention, improved deep breathing, paced activity, and improved tech w/ tfers   Family Acceptance, E,ANUEL BLACKMAN VU, DU,NR by  at 2/4/2023 1828    Comment: education re: purpose of PT/importance of activity, for safety/falls prevention, improved deep breathing, paced activity, and improved tech w/ tfers                   Point: Home exercise program (Done)     Learning Progress Summary           Patient Acceptance, E,YEHUDA,LEDY AGEE DU,NR by  at 2/4/2023 1828    Comment: education re: purpose of PT/importance  of activity, for safety/falls prevention, improved deep breathing, paced activity, and improved tech w/ tfers   Family Acceptance, E,TB,ANUEL, LEDY,CONCHITA,NR by MONIQUE at 2/4/2023 1828    Comment: education re: purpose of PT/importance of activity, for safety/falls prevention, improved deep breathing, paced activity, and improved tech w/ tfers                   Point: Precautions (Done)     Learning Progress Summary           Patient Acceptance, E,TB,ANUEL, LEDY,CONCHITA,NR by MONIQUE at 2/4/2023 1828    Comment: education re: purpose of PT/importance of activity, for safety/falls prevention, improved deep breathing, paced activity, and improved tech w/ tfers   Family Acceptance, E,TB,ANUEL, LEDY,CONCHITA,NR by MONIQUE at 2/4/2023 1828    Comment: education re: purpose of PT/importance of activity, for safety/falls prevention, improved deep breathing, paced activity, and improved tech w/ tfers                               User Key     Initials Effective Dates Name Provider Type Discipline     08/02/18 -  Nikki Olivares, PT Physical Therapist PT              PT Recommendation and Plan     Plan of Care Reviewed With: patient  Progress: no change  Outcome Evaluation: Pt. agreeable to therapy, but needed encouragement to actually participate. She c/o dizziness and feeling faint upon standing and needed sitting rest prior to walking.  Pt. was CGA for transfers and gait. She walked 200' with HHA. Will continue to work with pt on progressive activity.   Outcome Measures     Row Name 02/05/23 0745             How much help from another person do you currently need...    Turning from your back to your side while in flat bed without using bedrails? 3  -MF      Moving from lying on back to sitting on the side of a flat bed without bedrails? 3  -MF      Moving to and from a bed to a chair (including a wheelchair)? 3  -MF      Standing up from a chair using your arms (e.g., wheelchair, bedside chair)? 3  -MF      Climbing 3-5 steps with a railing? 3  -MF      To walk in  hospital room? 3  -MF      AM-PAC 6 Clicks Score (PT) 18  -MF         Functional Assessment    Outcome Measure Options AM-PAC 6 Clicks Basic Mobility (PT)  -MF            User Key  (r) = Recorded By, (t) = Taken By, (c) = Cosigned By    Initials Name Provider Type    Noemi Pelayo PTA Physical Therapist Assistant                 Time Calculation:    PT Charges     Row Name 02/05/23 1332 02/05/23 0810          Time Calculation    Start Time 1306  -MF 0745  -MF     Stop Time 1325  -MF 0809  -MF     Time Calculation (min) 19 min  -MF 24 min  -MF     PT Received On -- 02/05/23  -MF        Time Calculation- PT    Total Timed Code Minutes- PT 19 minute(s)  -MF 24 minute(s)  -MF        Timed Charges    77100 - Gait Training Minutes  19  -MF 24  -MF        Total Minutes    Timed Charges Total Minutes 19  -MF 24  -MF      Total Minutes 19  -MF 24  -MF           User Key  (r) = Recorded By, (t) = Taken By, (c) = Cosigned By    Initials Name Provider Type    Noemi Pelayo PTA Physical Therapist Assistant              Therapy Charges for Today     Code Description Service Date Service Provider Modifiers Qty    48166704247 HC GAIT TRAINING EA 15 MIN 2/5/2023 Noemi Gatica, JULI GP 2    43182665750 HC GAIT TRAINING EA 15 MIN 2/5/2023 Noemi Gatica PTA GP 1          PT G-Codes  Outcome Measure Options: AM-PAC 6 Clicks Basic Mobility (PT)  AM-PAC 6 Clicks Score (PT): 18  AM-PAC 6 Clicks Score (OT): 20    Noemi Gatica PTA  2/5/2023

## 2023-02-05 NOTE — THERAPY EVALUATION
Patient Name: Shilpa Zhang  : 1965    MRN: [id number]                              Today's Date: 2023       Admit Date: 2023    Visit Dx:     ICD-10-CM ICD-9-CM   1. Dissection of descending thoracic aorta  I71.012 441.01   2. Decreased activities of daily living (ADL)  Z78.9 V49.89   3. Impaired functional mobility and activity tolerance  Z74.09 V49.89     Patient Active Problem List   Diagnosis   • Dissection of descending thoracic aorta   • Nausea & vomiting   • Aortic dissection (HCC)     History reviewed. No pertinent past medical history.  Past Surgical History:   Procedure Laterality Date   • ABDOMINAL SURGERY        General Information     Row Name 23          Physical Therapy Time and Intention    Document Type evaluation;other (see comments)  see MAR  -     Mode of Treatment physical therapy  -     Row Name 23          General Information    Patient Profile Reviewed yes  -JE     Prior Level of Function independent:;all household mobility;community mobility;ADL's;home management;driving;shopping;using stairs  -     Existing Precautions/Restrictions fall   SBP less than 160  -     Barriers to Rehab medically complex  -     Row Name 23          Living Environment    People in Home alone  -     Row Name 23          Cognition    Orientation Status (Cognition) oriented x 4  -     Row Name 23          Safety Issues, Functional Mobility    Safety Issues Affecting Function (Mobility) friction/shear risk;safety precaution awareness  -     Impairments Affecting Function (Mobility) balance;endurance/activity tolerance;pain;strength  -           User Key  (r) = Recorded By, (t) = Taken By, (c) = Cosigned By    Initials Name Provider Type    Nikki Miles, PT Physical Therapist               Mobility     Row Name 23          Bed Mobility    Bed Mobility scooting/bridging;sit-supine  -     Scooting/Bridging  [place] (Bed Mobility) standby assist;verbal cues  -     Sit-Supine [place] (Bed Mobility) standby assist;verbal cues  -     Comment, (Bed Mobility) up in chair upon entry into room  -     Row Name 02/04/23 1650          Bed-Chair Transfer    Bed-Chair [place] (Transfers) contact guard;verbal cues  -     Row Name 02/04/23 1650          Sit-Stand Transfer    Sit-Stand [place] (Transfers) contact guard;minimum assist (75% patient effort);verbal cues  -     Row Name 02/04/23 1650          Gait/Stairs (Locomotion)    [place] Level (Gait) contact guard;verbal cues  -     Assistive Device (Gait) other (see comments)  HHA X 1  -     Distance in Feet (Gait) 200 ft  -     Deviations/Abnormal Patterns (Gait) gait speed decreased  -     Comment, (Gait/Stairs) education for paced activity and improved deep breathing  -           User Key  (r) = Recorded By, (t) = Taken By, (c) = Cosigned By    Initials Name Provider Type    Nikki Miles, PT Physical Therapist               Obj/Interventions     Row Name 02/04/23 1650          Range of Motion Comprehensive    Comment, General Range of Motion AROM all 4 extremities WFLS  -[facility] Name 02/04/23 1650          Strength Comprehensive (MMT)    Comment, General Manual Muscle Testing (MMT) Assessment no formal strength assessment, pt able to move all 4 extremities against gravity w/out difficulty  -[facility] Name 02/04/23 1650          Motor Skills    Therapeutic Exercise other (see comments)  performed warm up/cool down ex w/ U/LEs X 10 reps each  -[facility] Name 02/04/23 1650          Balance    Balance Assessment sitting static balance;sitting dynamic balance;sit to stand dynamic balance;standing static balance;standing dynamic balance  -     Static Sitting Balance independent  -     Dynamic Sitting Balance independent  -     Position, Sitting Balance supported;sitting in chair  -     Sit to Stand Dynamic Balance  minimal assist;contact guard;verbal cues  -     Static Standing Balance contact guard  -     Dynamic Standing Balance contact guard  -     Position/Device Used, Standing Balance supported  HHA  -     Row Name 02/04/23 1650          Sensory Assessment (Somatosensory)    Sensory Assessment (Somatosensory) sensation intact  -           User Key  (r) = Recorded By, (t) = Taken By, (c) = Cosigned By    Initials Name Provider Type    Nikki Miles, PT Physical Therapist               Goals/Plan     Row Name 02/04/23 1650          Bed Mobility Goal 1 (PT)    Activity/Assistive Device (Bed Mobility Goal 1, PT) rolling to left;rolling to right;scooting;sit to supine/supine to sit;sidelying to sit/sit to sidelying  -     [place] Level/Cues Needed (Bed Mobility Goal 1, PT) standby assist;independent  -JE     Time Frame (Bed Mobility Goal 1, PT) long term goal (LTG);10 days  -JE     Progress/Outcomes (Bed Mobility Goal 1, PT) goal ongoing  -     Row Name 02/04/23 1650          Transfer Goal 1 (PT)    Activity/Assistive Device (Transfer Goal 1, PT) sit-to-stand/stand-to-sit;bed-to-chair/chair-to-bed  -JE     [place] Level/Cues Needed (Transfer Goal 1, PT) standby assist;independent  -JE     Time Frame (Transfer Goal 1, PT) long term goal (LTG);10 days  -JE     Progress/Outcome (Transfer Goal 1, PT) goal ongoing  -     Row Name 02/04/23 1650          Gait Training Goal 1 (PT)    Activity/Assistive Device (Gait Training Goal 1, PT) gait (walking locomotion);decrease fall risk;improve balance and speed;increase endurance/gait distance;increase energy conservation  -     [place] Level (Gait Training Goal 1, PT) standby assist;independent  -JE     Distance (Gait Training Goal 1, PT) 300 ft  -     Time Frame (Gait Training Goal 1, PT) long term goal (LTG);10 days  -JE     Progress/Outcome (Gait Training Goal 1, PT) goal ongoing  [facility]     Row Name 02/04/23 1650          Patient Education Goal  (PT)    Activity (Patient Education Goal, PT) I w/ HEP for warm up/cool down ex  -JE     [place]/Cues/Accuracy (Memory Goal 2, PT) demonstrates adequately;independent;verbalizes understanding  -     Time Frame (Patient Education Goal, PT) long term goal (LTG);10 days  -     Progress/Outcome (Patient Education Goal, PT) goal ongoing  -     Row Name 02/04/23 1650          Therapy Assessment/Plan (PT)    Planned Therapy Interventions (PT) balance training;bed mobility training;gait training;home exercise program;postural re-education;patient/family education;ROM (range of motion);transfer training;other (see comments)  safety/falls prevention, breathing ex, energy conservation techniques  -           User Key  (r) = Recorded By, (t) = Taken By, (c) = Cosigned By    Initials Name Provider Type    Nikki Miles, PT Physical Therapist               Clinical Impression     Row Name 02/04/23 1650          Pain    Pretreatment Pain Rating 0/10 - no pain  -     Posttreatment Pain Rating 5/10  -     Pre/Posttreatment Pain Comment between shld; RN aware  -     Pain Intervention(s) Ambulation/increased activity;Repositioned;Rest  -     Row Name 02/04/23 1650          Plan of Care Review    Plan of Care Reviewed With patient;mother  -     Outcome Evaluation PT eval completed.  Pt sleeping upon therapy entering room, however roused easily.  Oriented X 4.  Agreeable to therapy.  Pt performed warm up ex w/ U/LEs X 10 reps each.  Tfers sit to stand w/ min to CGA from chair and BSC.  Pt performed gait activity ~ 200 ft w/ CGA, noted decrease step length and decrease heel strike w/ occassional scissoring of R foot over L.  No LOB noted.  Pt fatigues easily reporting she is just tired.  Pt returned to bed w/ SBA.  Performed cool down ex w/ U/LEs X 10 reps.  Pt will benefit from continued PT services to improve strengh, activity tolerance, balance, safety awareness, knowledge of energy conservation, warm  up/cool down ex and to improve I w/ functional mobility reducing fall risk.  Recommend home w/ assist and HH.  Pt's mother plans to stay w/ her at discharge.  Will follow for progress and needs.  -[place]IQUE     Row Name 02/04/23 1650          Therapy Assessment/Plan (PT)    Patient/Family Therapy Goals Statement (PT) improve tolerance to activity  -     Rehab Potential (PT) good, to achieve stated therapy goals  -     Criteria for Skilled Interventions Met (PT) yes;meets criteria;skilled treatment is necessary  -     Therapy Frequency (PT) 2 times/day  -     Predicted Duration of Therapy Intervention (PT) until discharge or goals achieved  -     Row Name 02/04/23 1650          Vital Signs    Pre Systolic BP Rehab 140  -     Pre Treatment Diastolic BP 54  -     Post Systolic BP Rehab 136  -     Post Treatment Diastolic BP 58  -     Pretreatment Heart Rate (beats/min) 77  -JE     Posttreatment Heart Rate (beats/min) 70  -     Pretreatment Resp Rate (breaths/min) 18  -JE     Posttreatment Resp Rate (breaths/min) 19  -     Pre SpO2 (%) 92  -     O2 Delivery Pre Treatment room air  -     O2 Delivery Intra Treatment room air  -JE     Post SpO2 (%) 94  -     O2 Delivery Post Treatment room air  -     Pre Patient Position Sitting  -     Intra Patient Position Standing  -     Post Patient Position Supine  -     Row Name 02/04/23 1650          Positioning and Restraints    Pre-Treatment Position sitting in chair/recliner  -     Post Treatment Position bed  -JE     In Bed notified nsg;fowlers;call light within reach;encouraged to call for assist;with family/caregiver;side rails up x2;patient within staff view;with nsg  -           User Key  (r) = Recorded By, (t) = Taken By, (c) = Cosigned By    Initials Name Provider Type    Nikki Miles, PT Physical Therapist               Outcome Measures     Row Name 02/04/23 1650 02/04/23 0745       How much help from another person do you currently  need...    Turning from your back to your side while in flat bed without using bedrails? 3  -JE 3  -MG    Moving from lying on back to sitting on the side of a flat bed without bedrails? 3  -JE 3  -MG    Moving to and from a bed to a chair (including a wheelchair)? 3  -JE 3  -MG    Standing up from a chair using your arms (e.g., wheelchair, bedside chair)? 3  -JE 3  -MG    Climbing 3-5 steps with a railing? 3  -JE 2  -MG    To walk in hospital room? 3  -JE 3  -MG    AM-PAC 6 Clicks Score (PT) 18  -JE 17  -MG    Highest level of mobility 6 --> Walked 10 steps or more  - 5 --> Static standing  -MG    Row Name 02/04/23 1650 02/04/23 1050       Functional Assessment    Outcome Measure Options AM-PAC 6 Clicks Basic Mobility (PT)  -MONIQUE AM-PAC 6 Clicks Daily Activity (OT)  -CARLOS          User Key  (r) = Recorded By, (t) = Taken By, (c) = Cosigned By    Initials Name Provider Type    Nikki Miles, PT Physical Therapist    Chantelle Gallegos, OTR/L, CSRS Occupational Therapist    Fabiano Bunn, RN Registered Nurse                             Physical Therapy Education     Title: PT OT SLP Therapies (In Progress)     Topic: Physical Therapy (Done)     Point: Mobility training (Done)     Learning Progress Summary           Patient Acceptance, E,TB,ANUEL, LEDY,CONCHITA,NR by  at 2/4/2023 1828    Comment: education re: purpose of PT/importance of activity, for safety/falls prevention, improved deep breathing, paced activity, and improved tech w/ tfers   Family Acceptance, E,TB,D, LEDY,CONCHITA,NR by  at 2/4/2023 1828    Comment: education re: purpose of PT/importance of activity, for safety/falls prevention, improved deep breathing, paced activity, and improved tech w/ tfers                   Point: Home exercise program (Done)     Learning Progress Summary           Patient Acceptance, E,TB,D, LEDY,CONCHITA,NR by  at 2/4/2023 1828    Comment: education re: purpose of PT/importance of activity, for safety/falls prevention, improved deep  breathing, paced activity, and improved tech w/ tfers   Family Acceptance, E,TB,D, VU,DU,NR by  at 2/4/2023 1828    Comment: education re: purpose of PT/importance of activity, for safety/falls prevention, improved deep breathing, paced activity, and improved tech w/ tfers                   Point: Precautions (Done)     Learning Progress Summary           Patient Acceptance, E,TB,D, VU,DU,NR by  at 2/4/2023 1828    Comment: education re: purpose of PT/importance of activity, for safety/falls prevention, improved deep breathing, paced activity, and improved tech w/ tfers   Family Acceptance, E,TB,D, VU,DU,NR by  at 2/4/2023 1828    Comment: education re: purpose of PT/importance of activity, for safety/falls prevention, improved deep breathing, paced activity, and improved tech w/ tfers                               User Key     Initials Effective Dates Name Provider Type Discipline     08/02/18 -  Nikki Olivares, PT Physical Therapist PT              PT Recommendation and Plan  Planned Therapy Interventions (PT): balance training, bed mobility training, gait training, home exercise program, postural re-education, patient/family education, ROM (range of motion), transfer training, other (see comments) (safety/falls prevention, breathing ex, energy conservation techniques)  Plan of Care Reviewed With: patient, mother  Outcome Evaluation: PT eval completed.  Pt sleeping upon therapy entering room, however roused easily.  Oriented X 4.  Agreeable to therapy.  Pt performed warm up ex w/ U/LEs X 10 reps each.  Tfers sit to stand w/ min to CGA from chair and BSC.  Pt performed gait activity ~ 200 ft w/ CGA, noted decrease step length and decrease heel strike w/ occassional scissoring of R foot over L.  No LOB noted.  Pt fatigues easily reporting she is just tired.  Pt returned to bed w/ SBA.  Performed cool down ex w/ U/LEs X 10 reps.  Pt will benefit from continued PT services to improve strengh, activity  tolerance, balance, safety awareness, knowledge of energy conservation, warm up/cool down ex and to improve I w/ functional mobility reducing fall risk.  Recommend home w/ assist and HH.  Pt's mother plans to stay w/ her at discharge.  Will follow for progress and needs.     Time Calculation:    PT Charges     Row Name 02/04/23 1731             Time Calculation    Start Time 1650  -      Stop Time 1731  -      Time Calculation (min) 41 min  -      PT Received On 02/04/23  -      PT Goal Re-Cert Due Date 02/14/23  -            User Key  (r) = Recorded By, (t) = Taken By, (c) = Cosigned By    Initials Name Provider Type    Nikki Miles, PT Physical Therapist              Therapy Charges for Today     Code Description Service Date Service Provider Modifiers Qty    [id number] HC PT EVAL MOD COMPLEXITY 3 2/4/2023 Nikki Olivares, PT GP 1          PT G-Codes  Outcome Measure Options: AM-PAC 6 Clicks Basic Mobility (PT)  AM-PAC 6 Clicks Score (PT): 18  AM-PAC 6 Clicks Score (OT): 20  PT Discharge Summary  Anticipated Discharge Disposition (PT): home with assist    Nikki Olivares, PT  2/4/2023

## 2023-02-05 NOTE — PLAN OF CARE
Goal Outcome Evaluation:           Progress: improving   Pain more manageable today. BP WNL. Pt was able to walk 200ft around unit 3x times.

## 2023-02-05 NOTE — PLAN OF CARE
Goal Outcome Evaluation:  Plan of Care Reviewed With: patient        Progress: no change  Outcome Evaluation: Pt. agreeable to therapy, but needed encouragement to actually participate. She c/o dizziness and feeling faint upon standing and needed sitting rest prior to walking.  Pt. was CGA for transfers and gait. She walked 200' with HHA. Will continue to work with pt on progressive activity.

## 2023-02-05 NOTE — PLAN OF CARE
Goal Outcome Evaluation:   Pt becoming more alert. Left nephrostomy tube output 1400 for the shift. Wound care completed per orders. Turned Q2. Morning potassium 2.7, electrolyte replacement protocol initiated and treatment initiated. Colostomy bag changed x1, output 750. VSS will continue to monitor.

## 2023-02-05 NOTE — PLAN OF CARE
Goal Outcome Evaluation:   Pt walked ~50ft down the sanchez, c/o feeling as if she was going to faint, pt was placed in a wheel chair and put back in bed. Pt went into SVT ~0010, given 5mg lopressor and pt went back to NSR. Pt complained of N/V ~0230, one time zofran given. Pt complained of pain ~0330, norco given. Pt currently up in chair. VSS, will continue to monitor.

## 2023-02-05 NOTE — PROGRESS NOTES
"Patient name: Shilpa Zhang  Patient : 1965  VISIT # [id number]  MR #[id number]    Procedure:Procedure(s):  THORACIC AORTIC ANEURYSM REPAIR USING ENDOGRAFT  BRACHIAL ARTERY EXPOSURE FOR LASER FENESTRATION OF THORACIC ENDOGRAFT FOR STENT PLACEMENT  THORACIC AORTIC ANEURYSM REPAIR  LUMBAR DRAIN INSERTION EXTERNAL  Procedure Date:2023  POD:3 Days Post-Op    Subjective   Abdominal pain/nausea    Afebrile.  On room air.  She is up in the recliner.  Developed SVT yesterday while attempting to ambulate.  Resolved with vagal maneuver and medications.  Currently normal sinus in the 60s.  Bowel movement yesterday.  Creatinine 0.8.  Hemoglobin 11.4.  Postop CT chest/abdomen completed yesterday.     Telemetry: Sinus, 60         Objective     Visit Vitals  /77   Pulse 62   Temp 98.6 °F (37 °C) (Oral)   Resp 16   Ht 167.6 cm (66\")   Wt 89.7 kg (197 lb 12.8 oz)   SpO2 99%   BMI 31.93 kg/m²       Intake/Output Summary (Last 24 hours) at 2023 0813  Last data filed at 2023 1800  Gross per 24 hour   Intake --   Output 900 ml   Net -900 ml     LABS:    CBC  WBC   Date/Time Value Ref Range Status   2023 024 12.07 (H) 3.40 - 10.80 10*3/mm3 Final     RBC   Date/Time Value Ref Range Status   2023 0244 3.76 (L) 3.77 - 5.28 10*6/mm3 Final     Hemoglobin   Date/Time Value Ref Range Status   2023 0244 11.4 (L) 12.0 - 15.9 g/dL Final     Hematocrit   Date/Time Value Ref Range Status   2023 0244 35.6 34.0 - 46.6 % Final     MCH   Date/Time Value Ref Range Status   2023 024 30.3 26.6 - 33.0 pg Final     MCHC   Date/Time Value Ref Range Status   2023 024 32.0 31.5 - 35.7 g/dL Final     RDW   Date/Time Value Ref Range Status   2023 0244 14.3 12.3 - 15.4 % Final     RDW-SD   Date/Time Value Ref Range Status   2023 0244 49.9 37.0 - 54.0 fl Final     MPV   Date/Time Value Ref Range Status   20234 12.2 (H) 6.0 - 12.0 fL Final     Platelets   Date/Time Value Ref Range " Status   02/05/2023 0244 172 140 - 450 10*3/mm3 Final       BMP  Glucose   Date/Time Value Ref Range Status   02/05/2023 0244 110 (H) 65 - 99 mg/dL Final     BUN   Date/Time Value Ref Range Status   02/05/2023 0244 13 6 - 20 mg/dL Final     Creatinine   Date/Time Value Ref Range Status   02/05/2023 0244 0.38 (L) 0.57 - 1.00 mg/dL Final     Sodium   Date/Time Value Ref Range Status   02/05/2023 0244 141 136 - 145 mmol/L Final     Potassium   Date/Time Value Ref Range Status   02/05/2023 0244 3.6 3.5 - 5.2 mmol/L Final     Chloride   Date/Time Value Ref Range Status   02/05/2023 0244 104 98 - 107 mmol/L Final     CO2   Date/Time Value Ref Range Status   02/05/2023 0244 28.0 22.0 - 29.0 mmol/L Final     Calcium   Date/Time Value Ref Range Status   02/05/2023 0244 8.6 8.6 - 10.5 mg/dL Final     BUN/Creatinine Ratio   Date/Time Value Ref Range Status   02/05/2023 0244 34.2 (H) 7.0 - 25.0 Final     Anion Gap   Date/Time Value Ref Range Status   02/05/2023 0244 9.0 5.0 - 15.0 mmol/L Final     eGFR   Date/Time Value Ref Range Status   02/05/2023 0244 117.0 >60.0 mL/min/1.73 Final     IMAGES:       Imaging Results (Last 24 Hours)     Procedure Component Value Units Date/Time    CT Abdomen Pelvis With Contrast [[id number]] Collected: 02/04/23 1317     Updated: 02/04/23 1329    Narrative:      CT ABDOMEN PELVIS W CONTRAST- 2/4/2023 10:21 AM CST     HISTORY: Aortic aneurysm (AAA), post-op; I71.012-Dissection of  descending thoracic aorta      COMPARISON: 01/31/2023     DOSE LENGTH PRODUCT: 523 mGy cm. Automated exposure control was also  utilized to decrease patient radiation dose.     TECHNIQUE: Axial images of the abdomen are performed following IV  contrast.     FINDINGS:  Endovascular graft of the thoracic aorta extends to the  diaphragmatic hiatus. A patent vascular graft is present within the  proximal celiac trunk with an intimal flap considered within the celiac  trunk to the level of the bifurcation. The distal  descending thoracic  and abdominal aortic dissection is visualized with partial filling of  contrast within the false lumen of the left lower thoracic aorta at/just  above the diaphragmatic hiatus. The intimal flap extends below the  endovascular graft throughout the abdominal aorta again extending into  the left common iliac artery. No internal or external iliac dissection  identified. The abdominal aorta measures 4.1 at the level of the  superior mesenteric artery, stable. Infrarenal abdominal aorta normal in  size measuring 2.7 cm. Appropriate opacification of the superior  mesenteric, bilateral renal, and inferior mesenteric arteries.     No suspicious focal liver or splenic mass. No pancreatic cyst or mass.  Gallbladder and adrenal glands are unremarkable. Appropriate enhancement  of the kidneys. No renal mass. No hydronephrosis. Bladder underdistended  containing air which may relate to recent Mcfarland catheterization.  Bicornuate uterus suspected. No adnexal mass. No free intraperitoneal  air loculated abscess. Ventral abdominal wall hernia mesh. Normal  appendix. No bowel obstruction. No pathologic lymphadenopathy.     Bony hemangioma suspected within the L2 vertebra. Degenerative changes  of the spine greatest at L4/L5.       Impression:      1. The endovascular graft of the descending thoracic aorta extends to  the diaphragmatic hiatus. Patent graft within the proximal celiac trunk  with questionable intimal flap of the celiac trunk extending to its  bifurcation. Partial filling of the false lumen within the distal  descending thoracic aorta with the intimal flap of the abdominal aorta  extending into the left common iliac artery. No internal/external iliac  dissection identified.  2. Bicornuate uterus considered with no adnexal mass.  3. Prior abdominal wall hernia repair with ventral midline mesh.  This report was finalized on 02/04/2023 13:26 by Dr. Chantelle Wan MD.    CT Chest With Contrast Diagnostic  [[id number]] Collected: 02/04/23 1202     Updated: 02/04/23 1213    Narrative:      CT CHEST W CONTRAST DIAGNOSTIC- 2/4/2023 10:21 AM CST     HISTORY: Thoracic aorta disease, post-op; I71.012-Dissection of  descending thoracic aorta      COMPARISON: 01/31/2023      DOSE LENGTH PRODUCT: 260 mGy cm. Automated exposure control was also  utilized to decrease patient radiation dose.     TECHNIQUE: Axial images the chest are performed following IV contrast.     FINDINGS:  Patient is undergone interval repair of the thoracic aortic  dissecting aneurysm. Endovascular graft within the arch of the aorta  extending inferiorly to the level of the diaphragmatic hiatus. There is  a patent proximal left subclavian artery stent in place. No evidence of  endoleak. The descending thoracic aorta measures approximately 6.2 cm  and maximal width, only minimal increase in the previous 5.6 cm  measurement. Stable ectasia of the ascending thoracic aorta with the  root measuring 4.6 cm in the ascending aorta measuring 4.5 cm cyst.  Stable cardiomegaly. No pericardial effusion. Please refer to CT abdomen  report for discussion of the celiac trunk stent and the abdominal aortic  dissection.     Mild increase in the size of a small left pleural effusion. Trace right  pleural fluid. No pathologic intrathoracic or axillary lymphadenopathy.     Mild basilar atelectasis. No parenchymal consolidation. No pneumothorax.  No endobronchial lesion.     Bony hemangioma suspected of the thoracic vertebra with moderate  degenerative change. No focal destructive osseous lesions.       Impression:      1. Endovascular repair of the thoracic aortic dissecting aneurysm. No  evidence of endoleak. Patent proximal left subclavian artery stent.  Proximal descending thoracic aorta measuring 6.2 cm in maximum width,  only subtle change from the previous preoperative 5.6 cm measurement.  Stable ectasia of the ascending thoracic aorta. Stable cardiomegaly  without  pericardial effusion.  2. Slight increase in a small left pleural effusion with trace right  pleural fluid. Questionable venous congestion with atelectasis.  This report was finalized on 02/04/2023 12:10 by Dr. Chantelle Wan MD.    XR Chest 1 View [[id number]] Collected: 02/04/23 1044     Updated: 02/04/23 1051    Narrative:      HISTORY: Status post Repair of thoracic aortic aneurysm with dissection     CXR: A frontal view the chest is obtained.     COMPARISON: 02/02/2023     FINDINGS: Endovascular stent extends from the aortic arch into the upper  abdominal aorta. A proximal left subclavian artery stent projects over  the thoracic vertebra on today's exam.     Cardiac silhouette remains enlarged. Improving pulmonary edema. She'll  trace left pleural effusion with probable basilar atelectasis. No  pneumothorax.       Impression:      1. Endovascular stent of the thoracic aorta with short segment proximal  left subclavian artery stent. Improving pulmonary edema with probable  basilar atelectasis and small left pleural effusion.  This report was finalized on 02/04/2023 10:48 by Dr. Chantelle Wan MD.        My image interpretation: Graft/Stent in place, basilar atelectasis, small left effusion    Physical Exam:  General: Alert, oriented. No apparent distress.  Obese.  Up in chair  Cardiovascular: Regular rate and rhythm without murmur, rubs, or gallops.    Pulmonary: Clear to auscultation bilaterally without wheezing, rubs, or rales.  Abdomen: Soft, nondistended, and nontender.  Extremities: Warm, moves all extremities. No edema. Bilateral groin sites C/D/I.    Neurologic:  Grossly intact with no focal deficits.       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   Lasix  20 mg   KCL  Chest x-ray in a.m.   Encourage ambulation  Transfer to  floor  Discussed with patient, family and nursing  Home in 1-2 days      ARTI Morin  02/05/23  08:13 CST

## 2023-02-06 ENCOUNTER — APPOINTMENT (OUTPATIENT)
Dept: GENERAL RADIOLOGY | Facility: HOSPITAL | Age: 58
DRG: 221 | End: 2023-02-06
Payer: MEDICAID

## 2023-02-06 LAB
ANION GAP SERPL CALCULATED.3IONS-SCNC: 10 MMOL/L (ref 5–15)
BUN SERPL-MCNC: 15 MG/DL (ref 6–20)
BUN/CREAT SERPL: 48.4 (ref 7–25)
CALCIUM SPEC-SCNC: 8.3 MG/DL (ref 8.6–10.5)
CHLORIDE SERPL-SCNC: 104 MMOL/L (ref 98–107)
CO2 SERPL-SCNC: 27 MMOL/L (ref 22–29)
CREAT SERPL-MCNC: 0.31 MG/DL (ref 0.57–1)
DEPRECATED RDW RBC AUTO: 50.5 FL (ref 37–54)
EGFRCR SERPLBLD CKD-EPI 2021: 122.9 ML/MIN/1.73
ERYTHROCYTE [DISTWIDTH] IN BLOOD BY AUTOMATED COUNT: 14.5 % (ref 12.3–15.4)
GLUCOSE SERPL-MCNC: 105 MG/DL (ref 65–99)
HCT VFR BLD AUTO: 35.5 % (ref 34–46.6)
HGB BLD-MCNC: 11.3 G/DL (ref 12–15.9)
MCH RBC QN AUTO: 30.2 PG (ref 26.6–33)
MCHC RBC AUTO-ENTMCNC: 31.8 G/DL (ref 31.5–35.7)
MCV RBC AUTO: 94.9 FL (ref 79–97)
PLATELET # BLD AUTO: 188 10*3/MM3 (ref 140–450)
PMV BLD AUTO: 10.9 FL (ref 6–12)
POTASSIUM SERPL-SCNC: 3.7 MMOL/L (ref 3.5–5.2)
QT INTERVAL: 302 MS
QT INTERVAL: 500 MS
QTC INTERVAL: 489 MS
QTC INTERVAL: 558 MS
RBC # BLD AUTO: 3.74 10*6/MM3 (ref 3.77–5.28)
SODIUM SERPL-SCNC: 141 MMOL/L (ref 136–145)
WBC NRBC COR # BLD: 11.06 10*3/MM3 (ref 3.4–10.8)

## 2023-02-06 PROCEDURE — 25010000002 ENOXAPARIN PER 10 MG: Performed by: SURGERY

## 2023-02-06 PROCEDURE — 99024 POSTOP FOLLOW-UP VISIT: CPT | Performed by: SURGERY

## 2023-02-06 PROCEDURE — 25010000002 FUROSEMIDE PER 20 MG: Performed by: NURSE PRACTITIONER

## 2023-02-06 PROCEDURE — 71046 X-RAY EXAM CHEST 2 VIEWS: CPT

## 2023-02-06 PROCEDURE — 97535 SELF CARE MNGMENT TRAINING: CPT | Performed by: OCCUPATIONAL THERAPIST

## 2023-02-06 PROCEDURE — 80048 BASIC METABOLIC PNL TOTAL CA: CPT | Performed by: NURSE PRACTITIONER

## 2023-02-06 PROCEDURE — 25010000002 ONDANSETRON PER 1 MG: Performed by: SURGERY

## 2023-02-06 PROCEDURE — 85027 COMPLETE CBC AUTOMATED: CPT | Performed by: NURSE PRACTITIONER

## 2023-02-06 PROCEDURE — 97116 GAIT TRAINING THERAPY: CPT

## 2023-02-06 RX ORDER — ONDANSETRON 2 MG/ML
4 INJECTION INTRAMUSCULAR; INTRAVENOUS EVERY 6 HOURS PRN
Status: DISCONTINUED | OUTPATIENT
Start: 2023-02-06 | End: 2023-02-07 | Stop reason: HOSPADM

## 2023-02-06 RX ORDER — LOSARTAN POTASSIUM 50 MG/1
75 TABLET ORAL
Status: DISCONTINUED | OUTPATIENT
Start: 2023-02-07 | End: 2023-02-07 | Stop reason: HOSPADM

## 2023-02-06 RX ORDER — LOSARTAN POTASSIUM 50 MG/1
25 TABLET ORAL ONCE
Status: COMPLETED | OUTPATIENT
Start: 2023-02-06 | End: 2023-02-06

## 2023-02-06 RX ADMIN — PANTOPRAZOLE SODIUM 40 MG: 40 INJECTION, POWDER, FOR SOLUTION INTRAVENOUS at 06:08

## 2023-02-06 RX ADMIN — ONDANSETRON 4 MG: 2 INJECTION INTRAMUSCULAR; INTRAVENOUS at 10:04

## 2023-02-06 RX ADMIN — Medication 10 ML: at 21:37

## 2023-02-06 RX ADMIN — ATORVASTATIN CALCIUM 10 MG: 10 TABLET, FILM COATED ORAL at 08:43

## 2023-02-06 RX ADMIN — ANTACID TABLETS 2 TABLET: 500 TABLET, CHEWABLE ORAL at 17:51

## 2023-02-06 RX ADMIN — FLUOXETINE HYDROCHLORIDE 40 MG: 20 CAPSULE ORAL at 08:43

## 2023-02-06 RX ADMIN — Medication 10 ML: at 08:44

## 2023-02-06 RX ADMIN — LOSARTAN POTASSIUM 50 MG: 50 TABLET, FILM COATED ORAL at 08:43

## 2023-02-06 RX ADMIN — PHENYTOIN SODIUM 500 MG: 100 CAPSULE ORAL at 21:36

## 2023-02-06 RX ADMIN — ACETAMINOPHEN 650 MG: 325 TABLET, FILM COATED ORAL at 06:08

## 2023-02-06 RX ADMIN — CARVEDILOL 12.5 MG: 6.25 TABLET, FILM COATED ORAL at 08:43

## 2023-02-06 RX ADMIN — ENOXAPARIN SODIUM 40 MG: 100 INJECTION SUBCUTANEOUS at 08:43

## 2023-02-06 RX ADMIN — POTASSIUM CHLORIDE 20 MEQ: 10 CAPSULE, COATED, EXTENDED RELEASE ORAL at 08:43

## 2023-02-06 RX ADMIN — FUROSEMIDE 20 MG: 10 INJECTION, SOLUTION INTRAMUSCULAR; INTRAVENOUS at 08:43

## 2023-02-06 RX ADMIN — HYDROCODONE BITARTRATE AND ACETAMINOPHEN 1 TABLET: 5; 325 TABLET ORAL at 01:49

## 2023-02-06 RX ADMIN — LOSARTAN POTASSIUM 25 MG: 50 TABLET, FILM COATED ORAL at 12:25

## 2023-02-06 RX ADMIN — ACETAMINOPHEN 650 MG: 325 TABLET, FILM COATED ORAL at 17:51

## 2023-02-06 RX ADMIN — ACETAMINOPHEN 650 MG: 325 TABLET, FILM COATED ORAL at 11:15

## 2023-02-06 RX ADMIN — CARVEDILOL 12.5 MG: 6.25 TABLET, FILM COATED ORAL at 17:51

## 2023-02-06 RX ADMIN — AMLODIPINE BESYLATE 5 MG: 5 TABLET ORAL at 08:43

## 2023-02-06 RX ADMIN — CLOPIDOGREL 75 MG: 75 TABLET, FILM COATED ORAL at 17:51

## 2023-02-06 RX ADMIN — ASPIRIN 81 MG: 81 TABLET, COATED ORAL at 08:43

## 2023-02-06 NOTE — PLAN OF CARE
Problem: Adult Inpatient Plan of Care  Goal: Plan of Care Review  Recent Flowsheet Documentation  Taken 2/6/2023 1504 by Fabiano Aguayo, OTR/L  Progress: no change  Plan of Care Reviewed With: patient  Outcome Evaluation: OT tx completed. Pt reports no pain. Pt does report nausea and dizziness. Pt reports she plans to d/c tomorrow. Pt's mother present. Pt and mom easily distracted and require verbal cues to remain on task. Pt takes additional time for all tasks. Pt agreeable for bed level activity. Pt frequently gags while brushing teeth. Pt mod I with set up for brushing teeth, using mouthwash and washing face. Pt request no further activity d/t fatigue but appreciative of assistance. Continue OT POC.

## 2023-02-06 NOTE — PLAN OF CARE
Goal Outcome Evaluation:              Outcome Evaluation: RD nutrition follow-up completed.  Pt's po intake is 25% of one recorded meal.  Pt indicates that she will be d/c'd tomorrow.  Will continue to monitor po intake, needs for supplements, and follow for further d/c needs

## 2023-02-06 NOTE — PROGRESS NOTES
"Patient name: Shilpa Zhang  Patient : 1965  VISIT # [id number]  MR #[id number]    Procedure: Endovascular Repair of Descending Thoracic Aorta, for Type B Aortic Dissection, with Coverage of LSA, Percutaneous Access and Closure of Femoral Artery for Delivery of Endograft through Large Sheath, Intravascular Ultrasound during Therapeutic Intervention, including radiographic supervision and interpretation; Selective Angiography of Celiac Artery, Percutaneous Arterioplasty with Stenting of Celiac Artery, Left Brachial Artery Open Exposure for Vascular Access, Laser Fenestration of LSA Origin into TEVAR graft with Stenting, Nonselective Angiography of Aortic Arch and Distal Descending Aorta by Dr. Jefferson and Dr. Farrar    Procedure Date: 2023    POD:4 Days Post-Op    Subjective   Chief complaint: Back pain    Patient is resting in chair tolerating room air.  She complains of nausea and vomiting x1 episode this morning.  She feels that she got choked on her pills.  Nausea continues.  She is getting up and walking without issue.  Patient's mother at bedside.    Telemetry: Sinus rhythm 60s   IV drips: None    ROS: No fevers or chills.  No shortness of breath.  Improving back pain.  No chest pain.  Complains of nausea       Objective     Visit Vitals  /66 (BP Location: Right arm, Patient Position: Sitting)   Pulse 71   Temp 98.2 °F (36.8 °C) (Oral)   Resp 16   Ht 167.6 cm (66\")   Wt 89 kg (196 lb 3.2 oz)   SpO2 95%   BMI 31.67 kg/m²       Intake/Output Summary (Last 24 hours) at 2023 1020  Last data filed at 2023 1018  Gross per 24 hour   Intake --   Output 950 ml   Net -950 ml       Lab:     CBC:  Results from last 7 days   Lab Units 23  0737 23  0244 23  0327   WBC 10*3/mm3 11.06* 12.07* 12.61*   HEMATOCRIT % 35.5 35.6 36.0   PLATELETS 10*3/mm3 188 172 142          BMP:  Results from last 7 days   Lab Units 23  0737 23  0244 23  0327   SODIUM mmol/L 141 141 139 "   POTASSIUM mmol/L 3.7 3.6 3.6   CHLORIDE mmol/L 104 104 104   CO2 mmol/L 27.0 28.0 24.0   GLUCOSE mg/dL 105* 110* 104*   BUN mg/dL 15 13 8   CREATININE mg/dL 0.31* 0.38* 0.33*          COAG:  Results from last 7 days   Lab Units 01/31/23  1406   INR  1.38*   APTT seconds 37.0*       IMAGES:       Imaging Results (Last 24 Hours)     Procedure Component Value Units Date/Time    XR Chest 2 View [[id number]] Collected: 02/06/23 0653     Updated: 02/06/23 0658    Narrative:      EXAM/TECHNIQUE: XR CHEST 2 VW-     INDICATION: post op; I71.012-Dissection of descending thoracic aorta;  Z78.9-Other specified health status; Z74.09-Other reduced mobility     COMPARISON: 02/04/2023     FINDINGS:     Aortic endograft a LEFT subclavian stent are grossly stable in position.  Cardiac silhouette and mediastinal contours are stable. No change in  mild bibasilar pleural/parenchymal opacities. No change in central  vascular congestion. No visible pneumothorax. No acute osseous finding.        Impression:         No change in appearance of the chest.  This report was finalized on 02/06/2023 06:55 by Dr. Abelardo Green MD.            Physical Exam:  General: Alert, oriented. No apparent distress.  Obese.  On room air  Cardiovascular: Regular rate and rhythm without murmur, rubs, or gallops.    Pulmonary: Clear to auscultation bilaterally without wheezing, rubs, or rales.  Abdomen: Soft, nondistended, and nontender.  Extremities: Warm, moves all extremities. No edema. Bilateral groin sites C/D/I.    Neurologic:  Grossly intact with no focal deficits.            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  Increase losartan to 75 mg daily  Zofran as needed for nausea.  Likely discharge home tomorrow as long as nausea is resolved.  She will need 1 month follow-up  with Dr. Jefferson and Dr. Farrar with CTA chest, abdomen, and pelvis.  Discussed with patient and nursing.      Jayde Alvarez, APRN  02/06/23  10:20 CST

## 2023-02-06 NOTE — NURSING NOTE
Patient moved from the unit today to 4b. Patient A/Ox4. Patient VSS/RA. NSR in the 70s per tele. Continue monitoring.

## 2023-02-06 NOTE — PLAN OF CARE
Goal Outcome Evaluation:  Plan of Care Reviewed With: patient        Progress: improving  Outcome Evaluation: Patient improving. No new skin issues noted.  No falls so far.  BP wnl.  SB/S 59-68 MF/PAC/PVC coup. Dressing to lumbar area CDI.  Bilateral groin, CDI, area soft, pulses palp.  Lovenox, vte.  Call light in reach.  Mother at bedside.

## 2023-02-06 NOTE — DISCHARGE SUMMARY
[facility] Cardiothoracic Surgery  DISCHARGE SUMMARY        Date of Admission: 1/31/2023  Date of Discharge:  2/7/2023  Primary Care Physician: Yuriy Romero MD    Discharge Diagnoses:  Active Hospital Problems    Diagnosis    • **Aortic dissection (HCC)    • Essential hypertension    • Paroxysmal atrial fibrillation (HCC)    • Dissection of descending thoracic aorta    • Nausea & vomiting        Procedures Performed:   Endovascular Repair of Descending Thoracic Aorta, for Type B Aortic Dissection, with Coverage of LSA, Percutaneous Access and Closure of Femoral Artery for Delivery of Endograft through Large Sheath, Intravascular Ultrasound during Therapeutic Intervention, including radiographic supervision and interpretation; Selective Angiography of Celiac Artery, Percutaneous Arterioplasty with Stenting of Celiac Artery, Left Brachial Artery Open Exposure for Vascular Access, Laser Fenestration of LSA Origin into TEVAR graft with Stenting, Nonselective Angiography of Aortic Arch and Distal Descending Aorta by Dr. Jefferson and Dr. Farrar    HPI:  Ms. Shilpa Zhang is a 57 y.o. female who presented as a transfer from [facility] after 5 days of back pain as well as abdominal pain.  At OSH she underwent work-up and was found to have a type B aortic dissection originating distal to the left subclavian with retrograde dissection back into the left subclavian origin and distal dissection through the iliac arteries.  Given the aforementioned she was transferred to our facility for further treatment.  She continued to have pain and uncontrolled hypertension despite optimal medical therapy therefore Dr. Jefferson discussed with Dr. Farrar and they elected to proceed with TEVAR with possible coverage of left subclavian artery and laser fenestration.  Dr. Jefferson discussed the risk and benefits of proceeding with surgery with Ms. Zhang and she was agreeable to proceed.    [facility]  Course: On 2/2/2023, Ms. Zhang was taken to the operating room for endovascular repair of descending thoracic aorta.  See separate op note by Dr. Jefferson detailing the operation.  Dr. Cerda did place lumbar drain at this time.  Following surgery she was transferred to the PACU in stable condition.  After meeting PACU discharge criteria, she was transferred to the ICU for ongoing recovery.  Spinal drain was removed on postop day 1.  At this time patient was up out of bed walking without difficulty.  The remainder of her hospital stay was significant for blood pressure control and optimizing oral antihypertensive regimen.  She did have intermittent nausea that ultimately improved.  After patient's blood pressure stabilized and she was able to remain off Cardene, she was transferred to 4B on postop day 3.  She is eating well and is tolerating room air.  She has had a bowel movement.  During the early morning hours of postop day 5, she had a brief episode of paroxysmal atrial fibrillation and ultimately converted to normal sinus rhythm after 1 dose of IV Lopressor and she remained in normal sinus rhythm.  She is on beta-blocker therapy.  She meets criteria for discharge home on postop day 5.  The patient is agreeable to go home with her mother.    Follow-up with Dr. Jefferson and Dr. Farrar in 1 month with repeat CTA chest abdomen and pelvis prior to appointment.  She will be discharged home on Eliquis and Plavix.  Plan for outpatient follow-up with cardiology in 1 month due to brief episode of atrial fibrillation.    Condition on Discharge:  Neurologically intact and has good pain control.  She is eating well and has demonstrable good bowel function.  The heart is in normal sinus rhythm.      Discharge Disposition:  Home or Self Care [1]    Discharge Medications:     Discharge Medications      New Medications      Instructions Start Date   amLODIPine 5 MG tablet  Commonly known as: NORVASC   5 mg, Oral, Every 24 Hours  Scheduled      apixaban 5 MG tablet tablet  Commonly known as: ELIQUIS   5 mg, Oral, Every 12 Hours Scheduled      carvedilol 12.5 MG tablet  Commonly known as: COREG   12.5 mg, Oral, 2 Times Daily With Meals      clopidogrel 75 MG tablet  Commonly known as: PLAVIX   75 mg, Oral, Daily      HYDROcodone-acetaminophen 5-325 MG per tablet  Commonly known as: NORCO   1 tablet, Oral, Every 6 Hours PRN      losartan 50 MG tablet  Commonly known as: COZAAR   75 mg, Oral, Every 24 Hours Scheduled   Start Date: February 8, 2023        Continue These Medications      Instructions Start Date   atorvastatin 10 MG tablet  Commonly known as: LIPITOR   10 mg, Oral, Daily      Dilantin 100 MG capsule  Generic drug: phenytoin ER   500 mg, Oral, Nightly      PROzac 40 MG capsule  Generic drug: FLUoxetine   40 mg, Oral, Daily         Stop These Medications    aspirin 81 MG chewable tablet     cephalexin 500 MG capsule  Commonly known as: KEFLEX            Discharge Diet: Regular diet     Activity at Discharge:   No heavy lifting or driving for 1 week    Tobacco:  The patient does not use tobacco products and therefore does not need tobacco cessation education.    BMI: BMI is >= 30 and <35. (Class 1 Obesity). The following options were offered after discussion;: referral to primary care      Follow-up Appointments: Shilpa Zhang  is requested to see Yuriy Romero MD within 1-2 weeks from time of discharge, to see Dr. Jefferson in 1 month with repeat CTA chest abdomen pelvis, to see Dr. Farrar in 1 month, follow-up with cardiology in 1 month for paroxysmal atrial fibrillation

## 2023-02-06 NOTE — THERAPY TREATMENT NOTE
Patient Name: Shilpa Zhang  : 1965    MRN: [id number]                              Today's Date: 2023       Admit Date: 2023    Visit Dx:     ICD-10-CM ICD-9-CM   1. Dissection of descending thoracic aorta  I71.012 441.01   2. Decreased activities of daily living (ADL)  Z78.9 V49.89   3. Impaired functional mobility and activity tolerance  Z74.09 V49.89     Patient Active Problem List   Diagnosis   • Dissection of descending thoracic aorta   • Nausea & vomiting   • Aortic dissection (HCC)     History reviewed. No pertinent past medical history.  Past Surgical History:   Procedure Laterality Date   • ABDOMINAL SURGERY        General Information     Row Name 23 1504          OT Time and Intention    Document Type therapy note (daily note)  -MM     Mode of Treatment occupational therapy  -MM     Row Name 23 1504          General Information    Patient Profile Reviewed yes  -MM     Existing Precautions/Restrictions fall;cardiac  -MM     Barriers to Rehab medically complex  -MM     Row Name 23 1504          Cognition    Orientation Status (Cognition) oriented x 4  -MM     Row Name 23 1504          Safety Issues, Functional Mobility    Impairments Affecting Function (Mobility) balance;endurance/activity tolerance;pain;strength  -MM           User Key  (r) = Recorded By, (t) = Taken By, (c) = Cosigned By    Initials Name Provider Type    MM Fabiano Aguayo, OTR/L Occupational Therapist                 Mobility/ADL's     Row Name 23 1504          Bed Mobility    Bed Mobility sit-supine  -MM     Sit-Supine [place] (Bed Mobility) modified independence  long sitting to supine  -MM     Assistive Device (Bed Mobility) head of bed elevated  -MM     Row Name 23 1504          Activities of Daily Living    BADL Assessment/Intervention grooming  -MM     Row Name 23 1504          Grooming Assessment/Training    [place] Level (Grooming) oral care regimen;wash face,  hands;modified independence;set up  -MM     Position (Grooming) [place] sitting  -MM           User Key  (r) = Recorded By, (t) = Taken By, (c) = Cosigned By    Initials Name Provider Type    Fabiano Hays, OTR/L Occupational Therapist               Obj/Interventions     Row Name 02/06/23 1504          Balance    Static Sitting Balance independent  -MM     Dynamic Sitting Balance independent  -MM     Position, Sitting Balance supported;long sitting  -MM           User Key  (r) = Recorded By, (t) = Taken By, (c) = Cosigned By    Initials Name Provider Type    Fabiano Hays, OTR/L Occupational Therapist               Goals/Plan    No documentation.                Clinical Impression     Row Name 02/06/23 1504          Pain Assessment    Pretreatment Pain Rating 0/10 - no pain  -MM     Posttreatment Pain Rating 0/10 - no pain  -MM     Row Name 02/06/23 1504          Plan of Care Review    Plan of Care Reviewed With patient  -MM     Progress no change  -MM     Outcome Evaluation OT tx completed. Pt reports no pain. Pt does report nausea and dizziness. Pt reports she plans to d/c tomorrow. Pt's mother present. Pt and mom easily distracted and require verbal cues to remain on task. Pt takes additional time for all tasks. Pt agreeable for bed level activity. Pt frequently gags while brushing teeth. Pt mod I with set up for brushing teeth, using mouthwash and washing face. Pt request no further activity d/t fatigue but appreciative of assistance. Continue OT POC.  -MM     Row Name 02/06/23 1506          Therapy Plan Review/Discharge Plan (OT)    Anticipated Discharge Disposition (OT) home with assist;home with home health  -MM     Row Name 02/06/23 1506          Positioning and Restraints    Pre-Treatment Position in bed  -MM     Post Treatment Position bed  -MM     In Bed supine;call light within reach;encouraged to call for assist;side rails up x2;with family/caregiver  -MM           User Key  (r) = Recorded  By, (t) = Taken By, (c) = Cosigned By    Initials Name Provider Type    Fabiano Hays, OTR/L Occupational Therapist               Outcome Measures     Row Name 02/06/23 1504          How much help from another is currently needed...    Putting on and taking off regular lower body clothing? 3  -MM     Bathing (including washing, rinsing, and drying) 3  -MM     Toileting (which includes using toilet bed pan or urinal) 3  -MM     Putting on and taking off regular upper body clothing 3  -MM     Taking care of personal grooming (such as brushing teeth) 4  -MM     Eating meals 4  -MM     AM-PAC 6 Clicks Score (OT) 20  -MM     Row Name 02/06/23 1504          Functional Assessment    Outcome Measure Options AM-PAC 6 Clicks Daily Activity (OT)  -MM           User Key  (r) = Recorded By, (t) = Taken By, (c) = Cosigned By    Initials Name Provider Type    Fabiano Hays, OTR/L Occupational Therapist                Occupational Therapy Education     Title: PT OT SLP Therapies (In Progress)     Topic: Occupational Therapy (In Progress)     Point: ADL training (In Progress)     Description:   Instruct learner(s) on proper safety adaptation and remediation techniques during self care or transfers.   Instruct in proper use of assistive devices.              Learning Progress Summary           Patient Acceptance, E, NR by SAMANTHA at 2/6/2023 1603    Comment: benefits of increased activity    Acceptance, E, VU by CARLOS at 2/4/2023 1156                   Point: Home exercise program (Not Started)     Description:   Instruct learner(s) on appropriate technique for monitoring, assisting and/or progressing therapeutic exercises/activities.              Learner Progress:  Not documented in this visit.          Point: Precautions (Done)     Description:   Instruct learner(s) on prescribed precautions during self-care and functional transfers.              Learning Progress Summary           Patient Acceptance, E, VU by CARLOS at 2/4/2023  1156                   Point: Body mechanics (Not Started)     Description:   Instruct learner(s) on proper positioning and spine alignment during self-care, functional mobility activities and/or exercises.              Learner Progress:  Not documented in this visit.                      User Key     Initials Effective Dates Name Provider Type Discipline     06/16/21 -  Fabiano Aguayo OTR/L Occupational Therapist OT    CARLOS 11/10/21 -  Chantelle Norman OTR/L, SONIA Occupational Therapist OT              OT Recommendation and Plan     Plan of Care Review  Plan of Care Reviewed With: patient  Progress: no change  Outcome Evaluation: OT tx completed. Pt reports no pain. Pt does report nausea and dizziness. Pt reports she plans to d/c tomorrow. Pt's mother present. Pt and mom easily distracted and require verbal cues to remain on task. Pt takes additional time for all tasks. Pt agreeable for bed level activity. Pt frequently gags while brushing teeth. Pt mod I with set up for brushing teeth, using mouthwash and washing face. Pt request no further activity d/t fatigue but appreciative of assistance. Continue OT POC.     Time Calculation:    Time Calculation- OT     Row Name 02/06/23 1504             Time Calculation- OT    OT Start Time 1504  -MM      OT Stop Time 1527  -MM      OT Time Calculation (min) 23 min  -MM      Total Timed Code Minutes- OT 23 minute(s)  -MM      OT Received On 02/06/23  -MM         Timed Charges    57494 - OT Self Care/Mgmt Minutes 23  -MM         Total Minutes    Timed Charges Total Minutes 23  -MM       Total Minutes 23  -MM            User Key  (r) = Recorded By, (t) = Taken By, (c) = Cosigned By    Initials Name Provider Type     Fabiano Aguayo OTR/L Occupational Therapist              Therapy Charges for Today     Code Description Service Date Service Provider Modifiers Qty    40415376983 HC OT SELF CARE/MGMT/TRAIN EA 15 MIN 2/6/2023 Fabiano Aguayo OTR/L GO 2                Fabiano Aguayo, OTR/L  2/6/2023

## 2023-02-06 NOTE — PLAN OF CARE
Goal Outcome Evaluation:  Plan of Care Reviewed With: patient        Progress: improving  Outcome Evaluation: VSS this shift. Morning BP was 143 systolic, losartan dose increased to 75mg. Systolic BP the rest of the day have been below 140. R groin has steri strips in place, L groin MARCIE. Pulses WNL. Patient had an episode of nausea/vomiting this shift, PRN zofran given. Now complaining of indigestion, PRN tums given. S 67-74 up to 147 on tele. Plans to go home tomorrow. Safety maintained, no falls. Will cont to monitor and notify MD of any changes.

## 2023-02-06 NOTE — THERAPY TREATMENT NOTE
Acute Care - Physical Therapy Treatment Note  [facility]     Patient Name: Shilpa Zhang  : 1965  MRN: [id number]  Today's Date: 2023      Visit Dx:     ICD-10-CM ICD-9-CM   1. Dissection of descending thoracic aorta  I71.012 441.01   2. Decreased activities of daily living (ADL)  Z78.9 V49.89   3. Impaired functional mobility and activity tolerance  Z74.09 V49.89     Patient Active Problem List   Diagnosis   • Dissection of descending thoracic aorta   • Nausea & vomiting   • Aortic dissection (HCC)     History reviewed. No pertinent past medical history.  Past Surgical History:   Procedure Laterality Date   • ABDOMINAL SURGERY       PT Assessment (last 12 hours)     PT Evaluation and Treatment     Row Name 23 1144 23 1048       Physical Therapy Time and Intention    Subjective Information complains of;nausea/vomiting;dizziness  - --    Document Type therapy note (daily note)  - therapy note (daily note)  -    Mode of Treatment physical therapy  - --    Session Not Performed -- patient/family declined treatment  -    Comment, Session Not Performed -- Pt. reports having nausea at this time. Pt. declines to walk. Will attempt to check back.  -    Comment Lots of encouragement to get pt. to participate.  - --    Row Name 23 1144          General Information    Existing Precautions/Restrictions fall;cardiac  -     Row Name 23 1144          Pain    Pretreatment Pain Rating 0/10 - no pain  -     Posttreatment Pain Rating 0/10 - no pain  -     Row Name 23 1144          Bed Mobility    Sit-Supine [place] (Bed Mobility) modified independence  -     Assistive Device (Bed Mobility) head of bed elevated;bed rails  -     Row Name 23 1144          Sit-Stand Transfer    Sit-Stand [place] (Transfers) contact guard  -     Row Name 23 1144          Stand-Sit Transfer    Stand-Sit [place] (Transfers) contact guard  -     Row Name 23  1144          Gait/Stairs (Locomotion)    [place] Level (Gait) verbal cues;contact guard  -MF     Assistive Device (Gait) --  HHA  -MF     Distance in Feet (Gait) 150  1 standing rest  -MF     Deviations/Abnormal Patterns (Gait) stride length decreased;xiang decreased  -MF     Comment, (Gait/Stairs) Pt. c/o dizziness and unable to walk further.  -MF     Row Name             Wound 02/02/23 1519 Right anterior groin Incision    Wound - Properties Group Placement Date: 02/02/23  -AC Placement Time: 1519  -AC Present on Hospital Admission: N  -AC Side: Right  -AC Orientation: anterior  -AC Location: groin  -AC Primary Wound Type: Incision  -AC Additional Comments: PERCLOSE X2 2X2 TEGADERM  -AC    Retired Wound - Properties Group Placement Date: 02/02/23  -AC Placement Time: 1519  -AC Present on Hospital Admission: N  -AC Side: Right  -AC Orientation: anterior  -AC Location: groin  -AC Primary Wound Type: Incision  -AC Additional Comments: PERCLOSE X2 2X2 TEGADERM  -AC    Retired Wound - Properties Group Date first assessed: 02/02/23  -AC Time first assessed: 1519  -AC Present on Hospital Admission: N  -AC Side: Right  -AC Location: groin  -AC Primary Wound Type: Incision  -AC Additional Comments: PERCLOSE X2 2X2 TEGADERM  -AC    Row Name             Wound 02/02/23 1519 Left anterior groin Incision    Wound - Properties Group Placement Date: 02/02/23  -AC Placement Time: 1519  -AC Present on Hospital Admission: N  -AC Side: Left  -AC Orientation: anterior  -AC Location: groin  -AC Primary Wound Type: Incision  -AC Additional Comments: MYNX 2X2 TEGADERM  -AC    Retired Wound - Properties Group Placement Date: 02/02/23  -AC Placement Time: 1519  -AC Present on Hospital Admission: N  -AC Side: Left  -AC Orientation: anterior  -AC Location: groin  -AC Primary Wound Type: Incision  -AC Additional Comments: MYNX 2X2 TEGADERM  -AC    Retired Wound - Properties Group Date first assessed: 02/02/23  -AC Time first assessed:  1519  -AC Present on Hospital Admission: N  -AC Side: Left  -AC Location: groin  -AC Primary Wound Type: Incision  -AC Additional Comments: MYNX 2X2 TEGADERM  -AC    Row Name             Wound 02/02/23 1520 Left distal arm Incision    Wound - Properties Group Placement Date: 02/02/23  -AC Placement Time: 1520  -AC Present on Hospital Admission: N  -AC Side: Left  -AC Orientation: distal  -AC Location: arm  -AC Primary Wound Type: Incision  -AC Additional Comments: STERISTRIPS 4X4 TEGADERM  -AC    Retired Wound - Properties Group Placement Date: 02/02/23  -AC Placement Time: 1520  -AC Present on Hospital Admission: N  -AC Side: Left  -AC Orientation: distal  -AC Location: arm  -AC Primary Wound Type: Incision  -AC Additional Comments: STERISTRIPS 4X4 TEGADERM  -AC    Retired Wound - Properties Group Date first assessed: 02/02/23  -AC Time first assessed: 1520  -AC Present on Hospital Admission: N  -AC Side: Left  -AC Location: arm  -AC Primary Wound Type: Incision  -AC Additional Comments: STERISTRIPS 4X4 TEGADERM  -AC    Row Name 02/06/23 1144          Positioning and Restraints    Pre-Treatment Position sitting in chair/recliner  -     Post Treatment Position bed  -     In Bed fowlers;call light within reach;encouraged to call for assist;side rails up x2  -           User Key  (r) = Recorded By, (t) = Taken By, (c) = Cosigned By    Initials Name Provider Type    Francesca Valdez RN Registered Nurse    Noemi Pelayo PTA Physical Therapist Assistant                Physical Therapy Education     Title: PT OT SLP Therapies (In Progress)     Topic: Physical Therapy (Done)     Point: Mobility training (Done)     Learning Progress Summary           Patient Acceptance, E,TB,D, VU,DU,NR by  at 2/4/2023 3724    Comment: education re: purpose of PT/importance of activity, for safety/falls prevention, improved deep breathing, paced activity, and improved tech w/ tfers   Family Acceptance, E,TB,D,  CONCHITA VILLAFANA,RILEY by MONIQUE at 2/4/2023 1828    Comment: education re: purpose of PT/importance of activity, for safety/falls prevention, improved deep breathing, paced activity, and improved tech w/ tfers                   Point: Home exercise program (Done)     Learning Progress Summary           Patient Acceptance, E,TB,ANUEL, LEDY,CONCHITA,NR by MONIQUE at 2/4/2023 1828    Comment: education re: purpose of PT/importance of activity, for safety/falls prevention, improved deep breathing, paced activity, and improved tech w/ tfers   Family Acceptance, E,TB,ANUEL, LEDY,CONCHITA,NR by MONIQUE at 2/4/2023 1828    Comment: education re: purpose of PT/importance of activity, for safety/falls prevention, improved deep breathing, paced activity, and improved tech w/ tfers                   Point: Precautions (Done)     Learning Progress Summary           Patient Acceptance, E,TB,ANUEL, LEDY,CONCHITA,NR by MONIQUE at 2/4/2023 1828    Comment: education re: purpose of PT/importance of activity, for safety/falls prevention, improved deep breathing, paced activity, and improved tech w/ tfers   Family Acceptance, E,TB,ANUEL, LEDY,CONCHITA,NR by MONIQUE at 2/4/2023 1828    Comment: education re: purpose of PT/importance of activity, for safety/falls prevention, improved deep breathing, paced activity, and improved tech w/ tfers                               User Key     Initials Effective Dates Name Provider Type Discipline     08/02/18 -  Nikki Olivares, PT Physical Therapist PT              PT Recommendation and Plan     Plan of Care Reviewed With: patient  Progress: no change  Outcome Evaluation: Pt. agreeable to therapy, but needed encouragement to actually participate. She c/o dizziness and feeling faint upon standing and needed sitting rest prior to walking.  Pt. was CGA for transfers and gait. She walked 200' with HHA. Will continue to work with pt on progressive activity.   Outcome Measures     Row Name 02/06/23 1144 02/05/23 0745          How much help from another person do you currently  need...    Turning from your back to your side while in flat bed without using bedrails? 4  -MF 3  -MF     Moving from lying on back to sitting on the side of a flat bed without bedrails? 4  -MF 3  -MF     Moving to and from a bed to a chair (including a wheelchair)? 3  -MF 3  -MF     Standing up from a chair using your arms (e.g., wheelchair, bedside chair)? 3  -MF 3  -MF     Climbing 3-5 steps with a railing? 3  -MF 3  -MF     To walk in hospital room? 3  -MF 3  -MF     AM-PAC 6 Clicks Score (PT) 20  -MF 18  -MF        Functional Assessment    Outcome Measure Options AM-PAC 6 Clicks [place] Mobility (PT)  -MF AM-PAC 6 Clicks Basic Mobility (PT)  -MF           User Key  (r) = Recorded By, (t) = Taken By, (c) = Cosigned By    Initials Name Provider Type    Noemi Pelayo PTA Physical Therapist Assistant                 Time Calculation:    PT Charges     Row Name 02/06/23 1637             Time Calculation    Start Time 1144  -MF      Stop Time 1200  -MF      Time Calculation (min) 16 min  -MF      PT Received On 02/06/23  -MF         Time Calculation- PT    Total Timed Code Minutes- PT 16 minute(s)  -MF         Timed Charges    53905 - Gait Training Minutes  16  -MF         Total Minutes    Timed Charges Total Minutes 16  -MF       Total Minutes 16  -MF            User Key  (r) = Recorded By, (t) = Taken By, (c) = Cosigned By    Initials Name Provider Type     Noemi Gatica PTA Physical Therapist Assistant              Therapy Charges for Today     Code Description Service Date Service Provider Modifiers Qty    79334719616 HC GAIT TRAINING EA 15 MIN 2/5/2023 Noemi Gatica, JULI GP 2    97642754821 HC GAIT TRAINING EA 15 MIN 2/5/2023 Noemi Gatica, PTA GP 1    54667021671 HC GAIT TRAINING EA 15 MIN 2/6/2023 Noemi Gatica, JULI GP 1          PT G-Codes  Outcome Measure Options: AM-PAC 6 Clicks Daily Activity (OT)  AM-PAC 6 Clicks Score (PT): 20  AM-PAC 6 Clicks Score (OT): 20    Noemi  XIAO Gatica, PTA  2/6/2023

## 2023-02-06 NOTE — CASE MANAGEMENT/SOCIAL WORK
Continued Stay Note   [facility]     Patient Name: Shilpa Zhang  MRN: [id number]  Today's Date: 2/6/2023    Admit Date: 1/31/2023    Plan: Home   Discharge Plan     Row Name 02/06/23 1131       Plan    Plan Home    Plan Comments Received consult to meet with patient per her request. SW met with patient and her mother was present in room. Patient wanted information about disability. SHIRA informed patient that [facility] cannot arrange disability coverage but did tell patient that the nearest social security office to her is in [place], [place]. Patient says she is aware of where the social security office is and will go there to begin process. Patient says discharge is planned for tomorrow, 2/7, and says she has no discharge planning needs.    Final Discharge Disposition Code 01 - home or self-care                      KIMBERLY London

## 2023-02-07 ENCOUNTER — READMISSION MANAGEMENT (OUTPATIENT)
Dept: CALL CENTER | Facility: HOSPITAL | Age: 58
End: 2023-02-07
Payer: MEDICAID

## 2023-02-07 VITALS
DIASTOLIC BLOOD PRESSURE: 64 MMHG | BODY MASS INDEX: 31.47 KG/M2 | TEMPERATURE: 97.4 F | RESPIRATION RATE: 18 BRPM | HEIGHT: 66 IN | SYSTOLIC BLOOD PRESSURE: 137 MMHG | WEIGHT: 195.8 LBS | OXYGEN SATURATION: 94 % | HEART RATE: 78 BPM

## 2023-02-07 DIAGNOSIS — I71.012 DISSECTION OF DESCENDING THORACIC AORTA: ICD-10-CM

## 2023-02-07 DIAGNOSIS — I71.012 DISSECTION OF DESCENDING THORACIC AORTA: Primary | ICD-10-CM

## 2023-02-07 PROBLEM — I48.0 PAROXYSMAL ATRIAL FIBRILLATION: Status: ACTIVE | Noted: 2023-02-07

## 2023-02-07 PROBLEM — I10 ESSENTIAL HYPERTENSION: Status: ACTIVE | Noted: 2023-02-07

## 2023-02-07 LAB
ANION GAP SERPL CALCULATED.3IONS-SCNC: 10 MMOL/L (ref 5–15)
BUN SERPL-MCNC: 13 MG/DL (ref 6–20)
BUN/CREAT SERPL: 29.5 (ref 7–25)
CALCIUM SPEC-SCNC: 8.9 MG/DL (ref 8.6–10.5)
CHLORIDE SERPL-SCNC: 101 MMOL/L (ref 98–107)
CO2 SERPL-SCNC: 28 MMOL/L (ref 22–29)
CREAT SERPL-MCNC: 0.44 MG/DL (ref 0.57–1)
DEPRECATED RDW RBC AUTO: 51.8 FL (ref 37–54)
EGFRCR SERPLBLD CKD-EPI 2021: 113 ML/MIN/1.73
ERYTHROCYTE [DISTWIDTH] IN BLOOD BY AUTOMATED COUNT: 14.5 % (ref 12.3–15.4)
GLUCOSE SERPL-MCNC: 120 MG/DL (ref 65–99)
HCT VFR BLD AUTO: 38.3 % (ref 34–46.6)
HGB BLD-MCNC: 11.9 G/DL (ref 12–15.9)
MCH RBC QN AUTO: 30.1 PG (ref 26.6–33)
MCHC RBC AUTO-ENTMCNC: 31.1 G/DL (ref 31.5–35.7)
MCV RBC AUTO: 96.7 FL (ref 79–97)
PLATELET # BLD AUTO: 262 10*3/MM3 (ref 140–450)
PMV BLD AUTO: 11.3 FL (ref 6–12)
POTASSIUM SERPL-SCNC: 3.9 MMOL/L (ref 3.5–5.2)
RBC # BLD AUTO: 3.96 10*6/MM3 (ref 3.77–5.28)
SODIUM SERPL-SCNC: 139 MMOL/L (ref 136–145)
WBC NRBC COR # BLD: 10.85 10*3/MM3 (ref 3.4–10.8)

## 2023-02-07 PROCEDURE — 25010000002 ENOXAPARIN PER 10 MG: Performed by: SURGERY

## 2023-02-07 PROCEDURE — 25010000002 ONDANSETRON PER 1 MG: Performed by: SURGERY

## 2023-02-07 PROCEDURE — 25010000002 FUROSEMIDE PER 20 MG: Performed by: NURSE PRACTITIONER

## 2023-02-07 PROCEDURE — 85027 COMPLETE CBC AUTOMATED: CPT | Performed by: NURSE PRACTITIONER

## 2023-02-07 PROCEDURE — 97110 THERAPEUTIC EXERCISES: CPT

## 2023-02-07 PROCEDURE — 80048 BASIC METABOLIC PNL TOTAL CA: CPT | Performed by: NURSE PRACTITIONER

## 2023-02-07 RX ORDER — CARVEDILOL 12.5 MG/1
12.5 TABLET ORAL 2 TIMES DAILY WITH MEALS
Qty: 60 TABLET | Refills: 2 | Status: SHIPPED | OUTPATIENT
Start: 2023-02-07 | End: 2023-03-06 | Stop reason: SDUPTHER

## 2023-02-07 RX ORDER — HYDROCODONE BITARTRATE AND ACETAMINOPHEN 5; 325 MG/1; MG/1
1 TABLET ORAL EVERY 6 HOURS PRN
Qty: 15 TABLET | Refills: 0 | Status: SHIPPED | OUTPATIENT
Start: 2023-02-07 | End: 2023-03-09

## 2023-02-07 RX ORDER — CLOPIDOGREL BISULFATE 75 MG/1
75 TABLET ORAL DAILY
Qty: 30 TABLET | Refills: 2 | Status: SHIPPED | OUTPATIENT
Start: 2023-02-07 | End: 2023-02-07 | Stop reason: SDUPTHER

## 2023-02-07 RX ORDER — CLOPIDOGREL BISULFATE 75 MG/1
75 TABLET ORAL DAILY
Qty: 30 TABLET | Refills: 2 | Status: SHIPPED | OUTPATIENT
Start: 2023-02-07

## 2023-02-07 RX ORDER — LOSARTAN POTASSIUM 50 MG/1
75 TABLET ORAL
Qty: 45 TABLET | Refills: 2 | Status: SHIPPED | OUTPATIENT
Start: 2023-02-08

## 2023-02-07 RX ORDER — POTASSIUM CHLORIDE 750 MG/1
20 CAPSULE, EXTENDED RELEASE ORAL 2 TIMES DAILY WITH MEALS
Status: DISCONTINUED | OUTPATIENT
Start: 2023-02-07 | End: 2023-02-07 | Stop reason: HOSPADM

## 2023-02-07 RX ORDER — METOPROLOL TARTRATE 5 MG/5ML
5 INJECTION INTRAVENOUS ONCE
Status: COMPLETED | OUTPATIENT
Start: 2023-02-07 | End: 2023-02-07

## 2023-02-07 RX ORDER — AMLODIPINE BESYLATE 5 MG/1
5 TABLET ORAL
Qty: 30 TABLET | Refills: 2 | Status: SHIPPED | OUTPATIENT
Start: 2023-02-07 | End: 2023-02-07 | Stop reason: SDUPTHER

## 2023-02-07 RX ORDER — HYDROCODONE BITARTRATE AND ACETAMINOPHEN 5; 325 MG/1; MG/1
1 TABLET ORAL EVERY 6 HOURS PRN
Qty: 15 TABLET | Refills: 0 | Status: SHIPPED | OUTPATIENT
Start: 2023-02-07 | End: 2023-02-07 | Stop reason: SDUPTHER

## 2023-02-07 RX ORDER — CARVEDILOL 12.5 MG/1
12.5 TABLET ORAL 2 TIMES DAILY WITH MEALS
Qty: 60 TABLET | Refills: 2 | Status: SHIPPED | OUTPATIENT
Start: 2023-02-07 | End: 2023-02-07 | Stop reason: SDUPTHER

## 2023-02-07 RX ORDER — LOSARTAN POTASSIUM 50 MG/1
75 TABLET ORAL
Qty: 45 TABLET | Refills: 2 | Status: SHIPPED | OUTPATIENT
Start: 2023-02-08 | End: 2023-02-07 | Stop reason: SDUPTHER

## 2023-02-07 RX ORDER — AMLODIPINE BESYLATE 5 MG/1
5 TABLET ORAL
Qty: 30 TABLET | Refills: 2 | Status: SHIPPED | OUTPATIENT
Start: 2023-02-07

## 2023-02-07 RX ADMIN — POTASSIUM CHLORIDE 20 MEQ: 10 CAPSULE, COATED, EXTENDED RELEASE ORAL at 08:15

## 2023-02-07 RX ADMIN — Medication 10 ML: at 08:16

## 2023-02-07 RX ADMIN — LOSARTAN POTASSIUM 75 MG: 50 TABLET, FILM COATED ORAL at 08:14

## 2023-02-07 RX ADMIN — METOPROLOL TARTRATE 5 MG: 5 INJECTION INTRAVENOUS at 04:18

## 2023-02-07 RX ADMIN — CARVEDILOL 12.5 MG: 6.25 TABLET, FILM COATED ORAL at 08:15

## 2023-02-07 RX ADMIN — FUROSEMIDE 20 MG: 10 INJECTION, SOLUTION INTRAMUSCULAR; INTRAVENOUS at 08:15

## 2023-02-07 RX ADMIN — ASPIRIN 81 MG: 81 TABLET, COATED ORAL at 08:15

## 2023-02-07 RX ADMIN — ATORVASTATIN CALCIUM 10 MG: 10 TABLET, FILM COATED ORAL at 08:15

## 2023-02-07 RX ADMIN — ONDANSETRON 4 MG: 2 INJECTION INTRAMUSCULAR; INTRAVENOUS at 00:08

## 2023-02-07 RX ADMIN — FLUOXETINE HYDROCHLORIDE 40 MG: 20 CAPSULE ORAL at 08:15

## 2023-02-07 RX ADMIN — ENOXAPARIN SODIUM 40 MG: 100 INJECTION SUBCUTANEOUS at 08:15

## 2023-02-07 RX ADMIN — PANTOPRAZOLE SODIUM 40 MG: 40 INJECTION, POWDER, FOR SOLUTION INTRAVENOUS at 06:25

## 2023-02-07 RX ADMIN — AMLODIPINE BESYLATE 5 MG: 5 TABLET ORAL at 08:15

## 2023-02-07 RX ADMIN — APIXABAN 5 MG: 5 TABLET, FILM COATED ORAL at 10:32

## 2023-02-07 NOTE — CASE MANAGEMENT/SOCIAL WORK
Continued Stay Note  [facility]     Patient Name: Shilpa Zhang  MRN: [id number]  Today's Date: 2/7/2023    Admit Date: 1/31/2023    Plan: Home   Discharge Plan     Row Name 02/07/23 1018       Plan    Plan Home    Final Discharge Disposition Code 01 - home or self-care    Final Note Plan discharge home today, no discharge planning needs.                Expected Discharge Date and Time     Expected Discharge Date Expected Discharge Time    Feb 7, 2023             KIMBERLY London

## 2023-02-07 NOTE — PLAN OF CARE
Goal Outcome Evaluation:      Patient practise bed mobility in and from a flat bed. All transfers were independent. Sit to stand to sit was supervision. Refused to walk.

## 2023-02-07 NOTE — PROGRESS NOTES
"Patient name: Shilpa Zhang  Patient : 1965  VISIT # [id number]  MR #[id number]    Procedure: Endovascular Repair of Descending Thoracic Aorta, for Type B Aortic Dissection, with Coverage of LSA, Percutaneous Access and Closure of Femoral Artery for Delivery of Endograft through Large Sheath, Intravascular Ultrasound during Therapeutic Intervention, including radiographic supervision and interpretation; Selective Angiography of Celiac Artery, Percutaneous Arterioplasty with Stenting of Celiac Artery, Left Brachial Artery Open Exposure for Vascular Access, Laser Fenestration of LSA Origin into TEVAR graft with Stenting, Nonselective Angiography of Aortic Arch and Distal Descending Aorta by Dr. Jefferson and Dr. Farrar    Procedure Date: 2023    POD:5 Days Post-Op    Subjective   Chief complaint: Back pain    Patient is resting in bed tolerating room air.  1 brief episode of atrial fibrillation during the night and converted to sinus rhythm after 1 dose of IV Lopressor 5 mg.  She is now in sinus rhythm 60s.  She is hopeful for discharge home today    Telemetry: Sinus rhythm 60s   IV drips: None    ROS: No fevers or chills.  No shortness of breath.  Improving back pain.  No chest pain.  Complains of nausea although improved       Objective     Visit Vitals  /64 (BP Location: Left arm, Patient Position: Lying)   Pulse 78   Temp 97.4 °F (36.3 °C) (Oral)   Resp 18   Ht 167.6 cm (66\")   Wt 88.8 kg (195 lb 12.8 oz)   SpO2 94%   BMI 31.60 kg/m²       Intake/Output Summary (Last 24 hours) at 2023 1047  Last data filed at 2023 0900  Gross per 24 hour   Intake 950 ml   Output --   Net 950 ml       Lab:     CBC:  Results from last 7 days   Lab Units 23  0443 23  0737 23  0244   WBC 10*3/mm3 10.85* 11.06* 12.07*   HEMATOCRIT % 38.3 35.5 35.6   PLATELETS 10*3/mm3 262 188 172          BMP:  Results from last 7 days   Lab Units 23  0443 23  0737 23  0244   SODIUM mmol/L 139 " 141 141   POTASSIUM mmol/L 3.9 3.7 3.6   CHLORIDE mmol/L 101 104 104   CO2 mmol/L 28.0 27.0 28.0   GLUCOSE mg/dL 120* 105* 110*   BUN mg/dL 13 15 13   CREATININE mg/dL 0.44* 0.31* 0.38*          COAG:  Results from last 7 days   Lab Units 01/31/23  1406   INR  1.38*   APTT seconds 37.0*       IMAGES:       Imaging Results (Last 24 Hours)     ** No results found for the last 24 hours. **            Physical Exam:  General: Alert, oriented. No apparent distress.  Obese.  On room air  Cardiovascular: Regular rate and rhythm without murmur, rubs, or gallops.    Pulmonary: Clear to auscultation bilaterally without wheezing, rubs, or rales.  Abdomen: Soft, nondistended, and nontender.  Extremities: Warm, moves all extremities. No edema. Bilateral groin sites C/D/I.    Neurologic:  Grossly intact with no focal deficits.            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  Blood pressure well controlled on current regimen.  Replace potassium  She is ready for discharge home today.  She will be discharged home on Eliquis and Plavix.  Plan for outpatient follow-up with cardiology in 1 month due to paroxysmal atrial fibrillation.  She will need 1 month follow-up with Dr. Jefferson and Dr. Farrar with CTA chest, abdomen, and pelvis.  No driving or heavy lifting for 1 week after discharge.  Nursing to discuss staple removal at previous lumbar drain site with neurosurgery prior to discharge.  Nursing to discuss left arm dressing with vascular surgery prior to discharge.  Discussed with patient and nursing.      Jayde Alvarez, APRN  02/07/23  10:47 CST

## 2023-02-07 NOTE — THERAPY TREATMENT NOTE
Acute Care - Physical Therapy Treatment Note  [facility]     Patient Name: Shilpa Zhang  : 1965  MRN: [id number]  Today's Date: 2023      Visit Dx:     ICD-10-CM ICD-9-CM   1. Dissection of descending thoracic aorta  I71.012 441.01   2. Decreased activities of daily living (ADL)  Z78.9 V49.89   3. Impaired functional mobility and activity tolerance  Z74.09 V49.89     Patient Active Problem List   Diagnosis   • Dissection of descending thoracic aorta   • Nausea & vomiting   • Aortic dissection (HCC)   • Essential hypertension   • Paroxysmal atrial fibrillation (HCC)     History reviewed. No pertinent past medical history.  Past Surgical History:   Procedure Laterality Date   • ABDOMINAL SURGERY       PT Assessment (last 12 hours)     PT Evaluation and Treatment     [facility] Name 23 1042          Physical Therapy Time and Intention    Subjective Information no complaints  -     Document Type therapy note (daily note)  -     Mode of Treatment physical therapy  -[facility] Name 23 1042          General Information    Existing Precautions/Restrictions fall;cardiac  -[facility] Name 23 1042          Pain    Pretreatment Pain Rating 0/10 - no pain  -     Posttreatment Pain Rating 0/10 - no pain  -[facility] Name 23 1042          Bed Mobility    Supine-Sit [place] (Bed Mobility) independent  -     Sit-Supine [place] (Bed Mobility) independent  -     Comment, (Bed Mobility) practiced x3 from flat bed, no rails  -[facility] Name 23 1042          Sit-Stand Transfer    Sit-Stand [place] (Transfers) supervision  -[facility] Name 23 1042          Stand-Sit Transfer    Stand-Sit [place] (Transfers) supervision  -[facility] Name 23 1042          Gait/Stairs (Locomotion)    Comment, (Gait/Stairs) refused to walk  -[facility] Name             Wound 23 1519 Right anterior groin Incision    Wound - Properties Group Placement Date: 23  -  Placement Time: 1519  -AC Present on Hospital Admission: N  -AC Side: Right  -AC Orientation: anterior  -AC Location: groin  -AC Primary Wound Type: Incision  -AC Additional Comments: PERCLOSE X2 2X2 TEGADERM  -AC    Retired Wound - Properties Group Placement Date: 02/02/23  -AC Placement Time: 1519  -AC Present on Hospital Admission: N  -AC Side: Right  -AC Orientation: anterior  -AC Location: groin  -AC Primary Wound Type: Incision  -AC Additional Comments: PERCLOSE X2 2X2 TEGADERM  -AC    Retired Wound - Properties Group Date first assessed: 02/02/23  -AC Time first assessed: 1519  -AC Present on Hospital Admission: N  -AC Side: Right  -AC Location: groin  -AC Primary Wound Type: Incision  -AC Additional Comments: PERCLOSE X2 2X2 TEGADERM  -AC    Row Name             Wound 02/02/23 1519 Left anterior groin Incision    Wound - Properties Group Placement Date: 02/02/23  -AC Placement Time: 1519  -AC Present on Hospital Admission: N  -AC Side: Left  -AC Orientation: anterior  -AC Location: groin  -AC Primary Wound Type: Incision  -AC Additional Comments: MYNX 2X2 TEGADERM  -AC    Retired Wound - Properties Group Placement Date: 02/02/23  -AC Placement Time: 1519  -AC Present on Hospital Admission: N  -AC Side: Left  -AC Orientation: anterior  -AC Location: groin  -AC Primary Wound Type: Incision  -AC Additional Comments: MYNX 2X2 TEGADERM  -AC    Retired Wound - Properties Group Date first assessed: 02/02/23  -AC Time first assessed: 1519  -AC Present on Hospital Admission: N  -AC Side: Left  -AC Location: groin  -AC Primary Wound Type: Incision  -AC Additional Comments: MYNX 2X2 TEGADERM  -AC    Row Name             Wound 02/02/23 1520 Left distal arm Incision    Wound - Properties Group Placement Date: 02/02/23  -AC Placement Time: 1520  -AC Present on Hospital Admission: N  -AC Side: Left  -AC Orientation: distal  -AC Location: arm  -AC Primary Wound Type: Incision  -AC Additional Comments: STERISTRIPS 4X4  TEGADERM  -AC    Retired Wound - Properties Group Placement Date: 02/02/23  -AC Placement Time: 1520  -AC Present on Hospital Admission: N  -AC Side: Left  -AC Orientation: distal  -AC Location: arm  -AC Primary Wound Type: Incision  -AC Additional Comments: STERISTRIPS 4X4 TEGADERM  -AC    Retired Wound - Properties Group Date first assessed: 02/02/23  -AC Time first assessed: 1520  -AC Present on Hospital Admission: N  -AC Side: Left  -AC Location: arm  -AC Primary Wound Type: Incision  -AC Additional Comments: STERISTRIPS 4X4 TEGADERM  -AC    Row Name 02/07/23 1042          Positioning and Restraints    Pre-Treatment Position in bed  -ALEKS     Post Treatment Position bed  -ALEKS     In Bed sitting EOB;call light within reach  -ALEKS           User Key  (r) = Recorded By, (t) = Taken By, (c) = Cosigned By    Initials Name Provider Type    Zulema Pickard PTA Physical Therapist Assistant    Francesca Valdez RN Registered Nurse                Physical Therapy Education     Title: PT OT SLP Therapies (In Progress)     Topic: Physical Therapy (Done)     Point: Mobility training (Done)     Learning Progress Summary           Patient Acceptance, E,TB,D, LEDY,DU,NR by MONIQUE at 2/4/2023 1828    Comment: education re: purpose of PT/importance of activity, for safety/falls prevention, improved deep breathing, paced activity, and improved tech w/ tfers   Family Acceptance, E,TB,D, LEDY,CONCHITA,NR by MONIQUE at 2/4/2023 1828    Comment: education re: purpose of PT/importance of activity, for safety/falls prevention, improved deep breathing, paced activity, and improved tech w/ tfers                   Point: Home exercise program (Done)     Learning Progress Summary           Patient Acceptance, E,TB,D, LEDY,DU,NR by MONIQUE at 2/4/2023 1828    Comment: education re: purpose of PT/importance of activity, for safety/falls prevention, improved deep breathing, paced activity, and improved tech w/ tfers   Family Acceptance, E,TB,D, LEDY,DU,NR by MONIQUE at  2/4/2023 1828    Comment: education re: purpose of PT/importance of activity, for safety/falls prevention, improved deep breathing, paced activity, and improved tech w/ tfers                   Point: Precautions (Done)     Learning Progress Summary           Patient Acceptance, E,TB,ANUEL, CONCHITA VILLAFANA,NR by  at 2/4/2023 1828    Comment: education re: purpose of PT/importance of activity, for safety/falls prevention, improved deep breathing, paced activity, and improved tech w/ tfers   Family Acceptance, E,YEHUDA,ANUEL, LEDY,CONCHITA,NR by  at 2/4/2023 1828    Comment: education re: purpose of PT/importance of activity, for safety/falls prevention, improved deep breathing, paced activity, and improved tech w/ tfers                               User Key     Initials Effective Dates Name Provider Type Discipline     08/02/18 -  Nikki Olivares, PT Physical Therapist PT              PT Recommendation and Plan         Outcome Measures     Row Name 02/07/23 1200 02/06/23 1144 02/05/23 0745       How much help from another person do you currently need...    Turning from your back to your side while in flat bed without using bedrails? 4  -ALEKS 4  -MF 3  -MF    Moving from lying on back to sitting on the side of a flat bed without bedrails? 4  -ALEKS 4  -MF 3  -MF    Moving to and from a bed to a chair (including a wheelchair)? 4  -ALEKS 3  -MF 3  -MF    Standing up from a chair using your arms (e.g., wheelchair, bedside chair)? 4  -ALEKS 3  -MF 3  -MF    Climbing 3-5 steps with a railing? 4  -ALEKS 3  -MF 3  -MF    To walk in hospital room? 4  -ALEKS 3  -MF 3  -MF    AM-PAC 6 Clicks Score (PT) 24  -ALEKS 20  -MF 18  -MF       Functional Assessment    Outcome Measure Options -- AM-PAC 6 Clicks Basic Mobility (PT)  -MF AM-PAC 6 Clicks Basic Mobility (PT)  -MF          User Key  (r) = Recorded By, (t) = Taken By, (c) = Cosigned By    Initials Name Provider Type    Zulema Pickard, PTA Physical Therapist Assistant    Noemi Pelayo, JULI Physical  Therapist Assistant                 Time Calculation:    PT Charges     Row Name 02/07/23 1204             Time Calculation    Start Time 1042  -ALEKS      Stop Time 1052  -ALEKS      Time Calculation (min) 10 min  -ALEKS         Timed Charges    92156 - PT Therapeutic Exercise Minutes 10  -ALEKS         Total Minutes    Timed Charges Total Minutes 10  -ALEKS       Total Minutes 10  -ALEKS            User Key  (r) = Recorded By, (t) = Taken By, (c) = Cosigned By    Initials Name Provider Type    ALEKS Zulema Walker PTA Physical Therapist Assistant              Therapy Charges for Today     Code Description Service Date Service Provider Modifiers Qty    01295484965 HC PT THER PROC EA 15 MIN 2/7/2023 Zulema Walker PTA GP 1          PT G-Codes  Outcome Measure Options: AM-PAC 6 Clicks Daily Activity (OT)  AM-PAC 6 Clicks Score (PT): 24  AM-PAC 6 Clicks Score (OT): 20    Zulema Walker PTA  2/7/2023

## 2023-02-08 NOTE — THERAPY DISCHARGE NOTE
Acute Care - Physical Therapy Discharge Summary  [facility]       Patient Name: Shilpa Zhang  : 1965  MRN: [id number]    Today's Date: 2023                 Admit Date: 2023      PT Recommendation and Plan    Visit Dx:    ICD-10-CM ICD-9-CM   1. Dissection of descending thoracic aorta  I71.012 441.01   2. Decreased activities of daily living (ADL)  Z78.9 V49.89   3. Impaired functional mobility and activity tolerance  Z74.09 V49.89        Outcome Measures     Row Name 23 1200 23 1144          How much help from another person do you currently need...    Turning from your back to your side while in flat bed without using bedrails? 4  -ALEKS 4  -MF     Moving from lying on back to sitting on the side of a flat bed without bedrails? 4  -ALEKS 4  -MF     Moving to and from a bed to a chair (including a wheelchair)? 4  -ALEKS 3  -MF     Standing up from a chair using your arms (e.g., wheelchair, bedside chair)? 4  -ALEKS 3  -MF     Climbing 3-5 steps with a railing? 4  -ALEKS 3  -MF     To walk in hospital room? 4  -ALEKS 3  -MF     AM-PAC 6 Clicks Score (PT) 24  -ALEKS 20  -MF        Functional Assessment    Outcome Measure Options -- AM-PAC 6 Clicks Basic Mobility (PT)  -           User Key  (r) = Recorded By, (t) = Taken By, (c) = Cosigned By    Initials Name Provider Type    Zulema Pickard PTA Physical Therapist Assistant     Noemi Gatica PTA Physical Therapist Assistant                     PT Rehab Goals     Row Name 23 0900             Bed Mobility Goal 1 (PT)    [place] Level/Cues Needed (Bed Mobility Goal 1, PT) independent  Goal:independent  -ALEKS      Progress/Outcomes (Bed Mobility Goal 1, PT) goal met  -ALEKS         Transfer Goal 1 (PT)    [place] Level/Cues Needed (Transfer Goal 1, PT) supervision required  Goal:independent  -ALEKS      Progress/Outcome (Transfer Goal 1, PT) goal not met  -ALEKS         Gait Training Goal 1 (PT)    [place] Level (Gait Training Goal 1, PT)  contact guard required  Goal:SBA  -ALEKS      Distance (Gait Training Goal 1, PT) 200  Goal:300  -ALEKS      Progress/Outcome (Gait Training Goal 1, PT) goal not met  -ALEKS         Patient Education Goal (PT)    [place]/Cues/Accuracy (Memory Goal 2, PT) --  Goal: demo warm ups/ HEP  adequately  -ALEKS      Progress/Outcome (Patient Education Goal, PT) goal not met  -ALEKS            User Key  (r) = Recorded By, (t) = Taken By, (c) = Cosigned By    Initials Name Provider Type Discipline    Zulema Pickard PTA Physical Therapist Assistant PT                Therapy Charges for Today     Code Description Service Date Service Provider Modifiers Qty    58022757970 HC PT THER PROC EA 15 MIN 2/7/2023 Zulema Walker PTA GP 1          PT Discharge Summary  Anticipated Discharge Disposition (PT): home with assist  Reason for Discharge: Discharge from facility  Outcomes Achieved: Patient able to partially acheive established goals  Discharge Destination: Home with assist      Zulema Walker PTA   2/8/2023

## 2023-02-08 NOTE — THERAPY DISCHARGE NOTE
Acute Care - Occupational Therapy Discharge Summary  [facility]     Patient Name: Shilpa Zhang  : 1965  MRN: [id number]    Today's Date: 2023                 Admit Date: 2023        OT Recommendation and Plan    Visit Dx:    ICD-10-CM ICD-9-CM   1. Dissection of descending thoracic aorta  I71.012 441.01   2. Decreased activities of daily living (ADL)  Z78.9 V49.89   3. Impaired functional mobility and activity tolerance  Z74.09 V49.89                OT Rehab Goals     Row Name 23 0800             Bathing Goal 1 (OT)    Activity/Device (Bathing Goal 1, OT) bathing skills, all  -AC      [place] Level/Cues Needed (Bathing Goal 1, OT) modified independence  -AC      Time Frame (Bathing Goal 1, OT) long term goal (LTG);by discharge  -AC      Progress/Outcomes (Bathing Goal 1, OT) goal not met  -AC         Dressing Goal 1 (OT)    Activity/Device (Dressing Goal 1, OT) dressing skills, all  -AC      [place]/Cues Needed (Dressing Goal 1, OT) independent  -AC      Time Frame (Dressing Goal 1, OT) long term goal (LTG);by discharge  -AC      Progress/Outcome (Dressing Goal 1, OT) goal not met  -AC         Toileting Goal 1 (OT)    Activity/Device (Toileting Goal 1, OT) toileting skills, all  -AC      [place] Level/Cues Needed (Toileting Goal 1, OT) independent  -AC      Time Frame (Toileting Goal 1, OT) long term goal (LTG);by discharge  -AC      Progress/Outcome (Toileting Goal 1, OT) goal not met  -AC            User Key  (r) = Recorded By, (t) = Taken By, (c) = Cosigned By    Initials Name Provider Type Discipline    Hipolito Hemphill, OTR/L, CNT Occupational Therapist OT                 Outcome Measures     Row Name 23 1200 23 1144          How much help from another person do you currently need...    Turning from your back to your side while in flat bed without using bedrails? 4  -ALEKS 4  -MF     Moving from lying on back to sitting on the side of a flat bed without  bedrails? 4  -ALEKS 4  -MF     Moving to and from a bed to a chair (including a wheelchair)? 4  -ALEKS 3  -MF     Standing up from a chair using your arms (e.g., wheelchair, bedside chair)? 4  -ALEKS 3  -MF     Climbing 3-5 steps with a railing? 4  -ALEKS 3  -MF     To walk in hospital room? 4  -ALEKS 3  -MF     AM-PAC 6 Clicks Score (PT) 24  -ALEKS 20  -MF        Functional Assessment    Outcome Measure Options -- AM-PAC 6 Clicks Basic Mobility (PT)  -MF           User Key  (r) = Recorded By, (t) = Taken By, (c) = Cosigned By    Initials Name Provider Type    Zulema Pickard, PTA Physical Therapist Assistant    Noemi Pelayo PTA Physical Therapist Assistant                Timed Therapy Charges  Total Units: 2    Suggested Charges  Total Units: 2    Procedure Name Documented Minutes Units Code    HC OT SELF CARE/MGMT/TRAIN EA 15 MIN 23  2    78230 (CPT®)               Documented Minutes  Total Minutes: 23    Therapy Provided Minutes    57554 - OT Self Care/Mgmt Minutes 23                    OT Discharge Summary  Anticipated Discharge Disposition (OT): home  Reason for Discharge: Discharge from facility  Outcomes Achieved: Refer to plan of care for updates on goals achieved  Discharge Destination: Home      KATHLEEN Crane/L, CNT  2/8/2023

## 2023-02-08 NOTE — OUTREACH NOTE
Prep Survey    Flowsheet Row Responses   [facility] facility patient discharged from? [place]   Is LACE score < 7 ? No   Eligibility Readm Mgmt   Discharge diagnosis Dissection of descending thoracic aorta   Does the patient have one of the following disease processes/diagnoses(primary or secondary)? Cardiothoracic surgery   Does the patient have Home health ordered? No   Is there a DME ordered? No   Medication alerts for this patient Eliquis, Plavix    Prep survey completed? Yes          Evelin JIMENEZ - Registered Nurse

## 2023-02-10 ENCOUNTER — READMISSION MANAGEMENT (OUTPATIENT)
Dept: CALL CENTER | Facility: HOSPITAL | Age: 58
End: 2023-02-10
Payer: MEDICAID

## 2023-02-10 NOTE — OUTREACH NOTE
CT Surgery Week 1 Survey    Flowsheet Row Responses   [facility] patient discharged from? [place]   Does the patient have one of the following disease processes/diagnoses(primary or secondary)? Cardiothoracic surgery   Week 1 attempt successful? Yes   Call start time [phone]   Call end time 1000   Discharge diagnosis Dissection of descending thoracic aorta   Meds reviewed with patient/caregiver? Yes   Is the patient having any side effects they believe may be caused by any medication additions or changes? No   Does the patient have all medications related to this admission filled (includes all antibiotics, pain medications, cardiac medications, etc.) Yes   Is the patient taking all medications as directed (includes completed medication regime)? Yes   Does the patient have a primary care provider?  Yes   Does the patient have an appointment scheduled with their C/T surgeon? Yes   Comments regarding PCP 2/14/23   Has the patient kept scheduled appointments due by today? N/A   Has home health visited the patient within 72 hours of discharge? N/A   Psychosocial issues? No   Did the patient receive a copy of their discharge instructions? Yes   Nursing interventions Reviewed instructions with patient   What is the patient's perception of their health status since discharge? Improving   Nursing interventions Nurse provided patient education   Is the patient/caregiver able to teach back normal signs of recovery? Nausea and lack of appetite   Is the patient /caregiver able to teach back basic post-op care? Shower daily, No tub bath, swimming, or hot tub until instructed by MD, Lifting as instructed by MD in discharge instructions, Drive as instructed by MD in discharge instructions   Is the patient/caregiver able to teach back signs and symptoms of incisional infection? Increased redness, swelling or pain at the incisonal site, Increased drainage or bleeding, Incisional warmth, Pus or odor from incision, Fever   Is the  patient/caregiver able to teach back steps to recovery at home? Set small, achievable goals for return to baseline health   Is the patient/caregiver able to teach back the hierarchy of who to call/visit for symptoms/problems? PCP, Specialist, Home health nurse, Urgent Care, ED, 911 Yes   Week 1 call completed? Yes   Wrap up additional comments Pt doing well at this time. no needs            OKSANA BAILEY - Registered Nurse

## 2023-03-06 ENCOUNTER — OFFICE VISIT (OUTPATIENT)
Dept: CARDIAC SURGERY | Facility: CLINIC | Age: 58
End: 2023-03-06
Payer: MEDICAID

## 2023-03-06 ENCOUNTER — HOSPITAL ENCOUNTER (OUTPATIENT)
Dept: CT IMAGING | Facility: HOSPITAL | Age: 58
Discharge: HOME | End: 2023-03-06
Admitting: SURGERY
Payer: MEDICAID

## 2023-03-06 VITALS
BODY MASS INDEX: 30.53 KG/M2 | HEIGHT: 66 IN | WEIGHT: 190 LBS | HEART RATE: 127 BPM | DIASTOLIC BLOOD PRESSURE: 64 MMHG | OXYGEN SATURATION: 97 % | SYSTOLIC BLOOD PRESSURE: 116 MMHG

## 2023-03-06 DIAGNOSIS — I71.012 DISSECTION OF DESCENDING THORACIC AORTA: Primary | ICD-10-CM

## 2023-03-06 DIAGNOSIS — I71.012 DISSECTION OF DESCENDING THORACIC AORTA: ICD-10-CM

## 2023-03-06 LAB — CREAT BLDA-MCNC: 0.5 MG/DL (ref 0.6–1.3)

## 2023-03-06 PROCEDURE — 3074F SYST BP LT 130 MM HG: CPT | Performed by: SURGERY

## 2023-03-06 PROCEDURE — 82565 ASSAY OF CREATININE: CPT

## 2023-03-06 PROCEDURE — 3078F DIAST BP <80 MM HG: CPT | Performed by: SURGERY

## 2023-03-06 PROCEDURE — 99024 POSTOP FOLLOW-UP VISIT: CPT | Performed by: SURGERY

## 2023-03-06 PROCEDURE — 25510000001 IOPAMIDOL PER 1 ML: Performed by: SURGERY

## 2023-03-06 PROCEDURE — 71275 CT ANGIOGRAPHY CHEST: CPT

## 2023-03-06 PROCEDURE — 74174 CTA ABD&PLVS W/CONTRAST: CPT

## 2023-03-06 RX ORDER — CARVEDILOL 12.5 MG/1
25 TABLET ORAL 2 TIMES DAILY WITH MEALS
Qty: 60 TABLET | Refills: 2 | Status: SHIPPED | OUTPATIENT
Start: 2023-03-06

## 2023-03-06 RX ORDER — FUROSEMIDE 20 MG/1
20 TABLET ORAL AS NEEDED
COMMUNITY

## 2023-03-06 RX ADMIN — IOPAMIDOL 100 ML: 755 INJECTION, SOLUTION INTRAVENOUS at 11:58

## 2023-03-06 NOTE — PROGRESS NOTES
"[facility] Cardiothoracic Surgery  PROGRESS NOTE          Procedure Performed:TEVAR from Zone 2 (Patient with Bovine Aortic Arch, Proximal Extent is up to Origin of Common Innominate Trunk) to Celiac Artery;  Stenting of Celiac Artery; Laser Fenestration of Ostium of LSA into Thoracic Endograft with Stenting    Date of Procedure: 02/02/2023    Subjective:  Shilpa Zhang is a 57-year-old female who presents today for a 6-week post-op follow-up.    In Summary The patient has a general feeling of wellness and is suffering from some fatigue. She maintains she is eating better but has some difficulty with sleep which has been an issue for her over the past year. She is still feeling some pain and associates it with stress. She is no longer suffering nausea or emesis and notes she has spread out the timing of when she takes her medication. Her heart rate today is elevated and she notes her  systolic blood pressure has been in the 120 mmHg range. The patient does not check her blood pressure at home on a consistent basis but notes she has not suffered any atrial fibrillation in several days. She has had several episodes post-op and notes she can tell when she is in atrial fibrillation and performs breathing exercises to help with her symptoms. She is scheduled to follow-up with her cardiologist Dr. Lancaster on 03/03/2023.     Objective:      03/06/23  1355   Weight: 86.2 kg (190 lb)              PE:  Visit Vitals  /64   Pulse (!) 127   Ht 167.6 cm (65.98\")   Wt 86.2 kg (190 lb)   SpO2 97%   BMI 30.68 kg/m²        No fevers, chills or recent illness.  The patient is obese and she is tachycardic but regular.     GENERAL: NAD, resting comfortably, normal color  CARDIOVASCULAR: regular, regular rate, sinus, well-healed sternotomy with stable sternum  PULMONARY: Normal bilateral breath sounds, no labored breathing  ABDOMEN: soft, nt/nd  EXTREMITIES: mild peripheral edema, normal pulses, normal ROM        "   Lab Results (last 72 hours)      ** No results found for the last 72 hours. **         I reviewed the patient's clinical results and discussed with patient.     CTA Chest:    FINDINGS:     Vascular findings:     Postoperative change of type B aortic dissection repair with endograft  beginning in the aortic arch and extending into the distal thoracic  aorta. Patent LEFT subclavian artery stent. Aortic arch branches are  widely patent. Interval decrease in diameter of the descending thoracic  aorta aneurysm sac size now measuring 4.5 cm transverse dimension on  image 63 of series 10 (previously 5 cm when measured in similar  location). There is a type IB endoleak at the inferior LEFT  posterolateral stent.     Dissection flap in the abdominal aorta begins at the distal aspect of  the endograft and extends into the LEFT common iliac artery. Patent  celiac artery stent. Widely patent SMA arising from the true lumen. JOSE MARIA  is patent and appears to arise from the false lumen. Both renal arteries  are widely patent. The RIGHT renal artery arises from the true lumen.  The LEFT renal artery arises from the false lumen but there appears to  be a fenestration through the dissection flap at the level of the LEFT  renal artery in the kidneys symmetrically enhance. Iliac arterial  vasculature is widely patent.     Nonvascular findings:     Central airways are clear. No consolidation or pleural effusion. Mild  atelectasis in the lung bases. No new or enlarging pulmonary nodule. No  enlarged thoracic lymph nodes. Chronic dilatation of the main pulmonary  artery. No central pulmonary artery filling defect. Cardiomegaly.     No arterial enhancing liver lesion. There is no gallstones or  gallbladder wall thickening. No biliary dilatation. Pancreas, spleen,  and adrenal glands are unremarkable. No solid renal mass. No  urolithiasis or hydronephrosis. No focal urinary bladder wall  thickening.     No bowel distention or evidence of  active bowel inflammation. No ascites  or free pelvic fluid. No pelvic mass or pelvic collection. No enlarged  abdominal or pelvic lymph nodes. Ventral abdominal wall hernia mesh  repair device. No acute osseous finding.     IMPRESSION:     Postoperative change of type B aortic dissection repair with endograft  extending from the aortic arch to the diaphragmatic hiatus. Interval  decrease in thoracic aneurysm sac size. Type IB endoleak at the distal  LEFT lateral stent. Patent LEFT subclavian artery and celiac artery  stents. In the abdomen, dissection flap begins at the distal aspect of  the thoracic endograft and courses through the abdominal aorta and into  the LEFT common iliac artery. Major abdominal aortic branches appear  widely patent and well opacified.   This report was finalized on 03/06/2023 13:34 by Dr. Abelardo Green MD.       CTA Abdomen and Pelvis:    FINDINGS:     Vascular findings:     Postoperative change of type B aortic dissection repair with endograft  beginning in the aortic arch and extending into the distal thoracic  aorta. Patent LEFT subclavian artery stent. Aortic arch branches are  widely patent. Interval decrease in diameter of the descending thoracic  aorta aneurysm sac size now measuring 4.5 cm transverse dimension on  image 63 of series 10 (previously 5 cm when measured in similar  location). There is a type IB endoleak at the inferior LEFT  posterolateral stent.     Dissection flap in the abdominal aorta begins at the distal aspect of  the endograft and extends into the LEFT common iliac artery. Patent  celiac artery stent. Widely patent SMA arising from the true lumen. JOSE MARIA  is patent and appears to arise from the false lumen. Both renal arteries  are widely patent. The RIGHT renal artery arises from the true lumen.  The LEFT renal artery arises from the false lumen but there appears to  be a fenestration through the dissection flap at the level of the LEFT  renal artery in the  kidneys symmetrically enhance. Iliac arterial  vasculature is widely patent.     Nonvascular findings:     Central airways are clear. No consolidation or pleural effusion. Mild  atelectasis in the lung bases. No new or enlarging pulmonary nodule. No  enlarged thoracic lymph nodes. Chronic dilatation of the main pulmonary  artery. No central pulmonary artery filling defect. Cardiomegaly.     No arterial enhancing liver lesion. There is no gallstones or  gallbladder wall thickening. No biliary dilatation. Pancreas, spleen,  and adrenal glands are unremarkable. No solid renal mass. No  urolithiasis or hydronephrosis. No focal urinary bladder wall  thickening.     No bowel distention or evidence of active bowel inflammation. No ascites  or free pelvic fluid. No pelvic mass or pelvic collection. No enlarged  abdominal or pelvic lymph nodes. Ventral abdominal wall hernia mesh  repair device. No acute osseous finding.     IMPRESSION:     Postoperative change of type B aortic dissection repair with endograft  extending from the aortic arch to the diaphragmatic hiatus. Interval  decrease in thoracic aneurysm sac size. Type IB endoleak at the distal  LEFT lateral stent. Patent LEFT subclavian artery and celiac artery  stents. In the abdomen, dissection flap begins at the distal aspect of  the thoracic endograft and courses through the abdominal aorta and into  the LEFT common iliac artery. Major abdominal aortic branches appear  widely patent and well opacified.   This report was finalized on 03/06/2023 13:34 by Dr. Abelardo Green MD.    I personally reviewed images of following exams, the following is my interpretation:     CTA Chest, Abdomen and Pelvis: Postoperative changes of TEVAR with fenestration of the left subclavian and celiac artery stent, there is a type I endoleak distally that appears to be coming up the false lumen and is overall minimally changed from previous CT scan prior to discharge home, there is  continued residual dissection through the distal abdominal aorta with good perfusion of abdominal visceral organs from the true lumen.       Assessment/Plan         Ms. Zhang is a 57-year-old female who is now 1 month status post TEVAR with laser fenestration and stenting of the left subclavian artery after coverage with TEVAR graft and stenting of the celiac artery. She had postoperative atrial fibrillation. She is currently tachycardic with heart rates in the 120s and I obtained EKG showing sinus tachycardia. I reviewed her postoperative imaging, she has patent stents from branches of her TEVAR graft and she has a distal type I endoleak that is stable, this is coming from the false lumen distally which is patent. She has good blood pressure control. Given her tachycardia associated with A. fib, we will increase her beta blocker to 25 mg of carvedilol. Twice a day today. She has follow-up scheduled with cardiology this week with Dr. Lancaster. She has follow-up scheduled with Dr. Farrar in 1 week as well.    Her ascending aorta is unchanged and measures 4.6 cm in maximum dimension. We discussed continued tight blood pressure control to keep this from expanding as well as smoking cessation. She understands. I will plan repeat CTA in 6 months and if has continued type I endoleak, we can discuss ballooning the distal end to try to stop this versus continued watching it given that it's coming from the false lumen distally. We'll discuss with Dr. Farrar. Follow up in 6 months with repeat CTA chest, abdomen, and pelvis.     Thank you for trusting me with the care of Ms. Zhang. Please don't hesitate to call with questions or concerns.         Riley Jefferson MD               Cardiothoracic Surgeon     Transcribed from ambient dictation for Riley Jefferson MD by Stanley Carvajal.  03/06/23   15:41 CST    Patient or patient representative verbalized consent to the visit recording.  I have personally performed the services described in  this document as transcribed by the above individual, and it is both accurate and complete.

## 2023-03-09 ENCOUNTER — OFFICE VISIT (OUTPATIENT)
Dept: CARDIOLOGY | Facility: CLINIC | Age: 58
End: 2023-03-09
Payer: MEDICAID

## 2023-03-09 ENCOUNTER — LAB (OUTPATIENT)
Dept: LAB | Facility: HOSPITAL | Age: 58
End: 2023-03-09
Payer: MEDICAID

## 2023-03-09 VITALS
WEIGHT: 189 LBS | HEART RATE: 75 BPM | SYSTOLIC BLOOD PRESSURE: 108 MMHG | BODY MASS INDEX: 30.37 KG/M2 | HEIGHT: 66 IN | DIASTOLIC BLOOD PRESSURE: 56 MMHG | OXYGEN SATURATION: 98 %

## 2023-03-09 DIAGNOSIS — Z86.79 HISTORY OF AORTIC DISSECTION: ICD-10-CM

## 2023-03-09 DIAGNOSIS — R00.2 PALPITATIONS: ICD-10-CM

## 2023-03-09 DIAGNOSIS — I47.1 SVT (SUPRAVENTRICULAR TACHYCARDIA): Primary | ICD-10-CM

## 2023-03-09 DIAGNOSIS — I10 ESSENTIAL HYPERTENSION: ICD-10-CM

## 2023-03-09 DIAGNOSIS — I48.0 PAROXYSMAL ATRIAL FIBRILLATION: ICD-10-CM

## 2023-03-09 DIAGNOSIS — I71.21 ANEURYSM OF ASCENDING AORTA WITHOUT RUPTURE: ICD-10-CM

## 2023-03-09 LAB
ANION GAP SERPL CALCULATED.3IONS-SCNC: 14 MMOL/L (ref 5–15)
BUN SERPL-MCNC: 18 MG/DL (ref 6–20)
BUN/CREAT SERPL: 42.9 (ref 7–25)
CALCIUM SPEC-SCNC: 9 MG/DL (ref 8.6–10.5)
CHLORIDE SERPL-SCNC: 104 MMOL/L (ref 98–107)
CO2 SERPL-SCNC: 24 MMOL/L (ref 22–29)
CREAT SERPL-MCNC: 0.42 MG/DL (ref 0.57–1)
EGFRCR SERPLBLD CKD-EPI 2021: 114.3 ML/MIN/1.73
GLUCOSE SERPL-MCNC: 86 MG/DL (ref 65–99)
POTASSIUM SERPL-SCNC: 4.3 MMOL/L (ref 3.5–5.2)
SODIUM SERPL-SCNC: 142 MMOL/L (ref 136–145)

## 2023-03-09 PROCEDURE — 80048 BASIC METABOLIC PNL TOTAL CA: CPT

## 2023-03-09 PROCEDURE — 99204 OFFICE O/P NEW MOD 45 MIN: CPT | Performed by: HOSPITALIST

## 2023-03-09 PROCEDURE — 36415 COLL VENOUS BLD VENIPUNCTURE: CPT

## 2023-03-09 PROCEDURE — 84443 ASSAY THYROID STIM HORMONE: CPT

## 2023-03-09 PROCEDURE — 1160F RVW MEDS BY RX/DR IN RCRD: CPT | Performed by: HOSPITALIST

## 2023-03-09 PROCEDURE — 3078F DIAST BP <80 MM HG: CPT | Performed by: HOSPITALIST

## 2023-03-09 PROCEDURE — 3074F SYST BP LT 130 MM HG: CPT | Performed by: HOSPITALIST

## 2023-03-09 PROCEDURE — 80061 LIPID PANEL: CPT

## 2023-03-09 PROCEDURE — 93000 ELECTROCARDIOGRAM COMPLETE: CPT | Performed by: HOSPITALIST

## 2023-03-09 PROCEDURE — 1159F MED LIST DOCD IN RCRD: CPT | Performed by: HOSPITALIST

## 2023-03-09 NOTE — PROGRESS NOTES
Reason For Visit:  Paroxysmal A-fib    Subjective        Shilpa Zhang is a 57 y.o. female with a PMH of recent type B aortic dissection s/p TEVAR, ascending aortic aneurysm, postop afib, HTN, and seizures who is referred for paroxysmal A-fib.    Patient was admitted 1/31 - 2/7/2023 for a type B aortic dissection that involves retrograde dissection back into the left subclavian origin and distal dissection through the iliac arteries.  Patient ultimately underwent endovascular repair by Dr. Jefferson (2/2/2023).  Postop course was complicated by brief paroxysmal A-fib episode that converted to sinus rhythm with IV Lopressor.  Echo during admission revealed normal biventricular systolic function, mild LVH, moderate aortic root dilation, and mild aortic valve regurgitation he was discharged on amlodipine 5 mg daily, Eliquis 5 mg twice daily, carvedilol 25 mg twice daily, Plavix 75 mg daily, losartan 75 mg daily, and atorvastatin 10 mg daily; she was referred to cardiology at discharge.    Since discharge, the patient reports that she has had intermittent palpitations without significant associated symptoms; she feels like this may suggest recurrent episodes of A-fib.  She had swelling in right leg s/p a fall; reports US negative for DVT by PCP, and the swelling is now improving.  She has had some mild fatigue since she left the hospital.  She otherwise has done well and denies chest pain, shortness of breath, lightheadedness, orthopnea, or lower extremity edema.    ROS: Pertinent positives and negatives are included above.    Pertinent past medical, surgical, family, and social history were reviewed and updated in the EMR.      Current Outpatient Medications:   •  amLODIPine (NORVASC) 5 MG tablet, Take 1 tablet by mouth Daily., Disp: 30 tablet, Rfl: 2  •  apixaban (ELIQUIS) 5 MG tablet tablet, Take 1 tablet by mouth Every 12 (Twelve) Hours. Indications: Atrial Fibrillation, Disp: 60 tablet, Rfl: 2  •  atorvastatin (LIPITOR)  "10 MG tablet, Take 1 tablet by mouth Daily., Disp: , Rfl:   •  carvedilol (COREG) 12.5 MG tablet, Take 2 tablets by mouth 2 (Two) Times a Day With Meals., Disp: 60 tablet, Rfl: 2  •  clopidogrel (PLAVIX) 75 MG tablet, Take 1 tablet by mouth Daily., Disp: 30 tablet, Rfl: 2  •  FLUoxetine (PROzac) 40 MG capsule, Take 1 capsule by mouth Daily., Disp: , Rfl:   •  furosemide (LASIX) 20 MG tablet, Take 1 tablet by mouth 2 (Two) Times a Day., Disp: , Rfl:   •  HYDROcodone-acetaminophen (NORCO) 5-325 MG per tablet, Take 1 tablet by mouth Every 6 (Six) Hours As Needed for Moderate Pain., Disp: 15 tablet, Rfl: 0  •  losartan (COZAAR) 50 MG tablet, Take 1.5 tablets by mouth Daily., Disp: 45 tablet, Rfl: 2  •  phenytoin ER (DILANTIN) 100 MG capsule, Take 5 capsules by mouth Every Night., Disp: , Rfl:      Objective   Vital Signs:  /56   Pulse 75   Ht 167.6 cm (66\")   Wt 85.7 kg (189 lb)   SpO2 98%   BMI 30.51 kg/m²   Estimated body mass index is 30.68 kg/m² as calculated from the following:    Height as of 3/6/23: 167.6 cm (65.98\").    Weight as of 3/6/23: 86.2 kg (190 lb).      Constitutional:       Appearance: Healthy appearance. Not in distress.   Neck:      Vascular: JVD normal.   Pulmonary:      Effort: Pulmonary effort is normal.      Breath sounds: Normal breath sounds.   Cardiovascular:      Normal rate. Regular rhythm.      Murmurs: There is a grade 1/6 systolic murmur at the LLSB.      No gallop. No click. No rub.   Edema:     Peripheral edema absent.   Abdominal:      General: There is no distension.      Palpations: Abdomen is soft.      Tenderness: There is no abdominal tenderness.   Skin:     General: Skin is warm and dry.   Neurological:      Mental Status: Alert and oriented to person, place and time.        Result Review :  The following data was reviewed by: Jakub Lancaster MD on 03/09/2023:  CMP   CMP    CMP 2/7/23 3/6/23 3/9/23   Glucose 120 (A)  86   BUN 13  18   Creatinine 0.44 (A) 0.50 (A) 0.42 " (A)   eGFR 113.0  114.3   Sodium 139  142   Potassium 3.9  4.3   Chloride 101  104   Calcium 8.9  9.0   BUN/Creatinine Ratio 29.5 (A)  42.9 (A)   Anion Gap 10.0  14.0   (A) Abnormal value       Comments are available for some flowsheets but are not being displayed.           CBC   CBC    CBC 2/5/23 2/6/23 2/7/23   WBC 12.07 (A) 11.06 (A) 10.85 (A)   RBC 3.76 (A) 3.74 (A) 3.96   Hemoglobin 11.4 (A) 11.3 (A) 11.9 (A)   Hematocrit 35.6 35.5 38.3   MCV 94.7 94.9 96.7   MCH 30.3 30.2 30.1   MCHC 32.0 31.8 31.1 (A)   RDW 14.3 14.5 14.5   Platelets 172 188 262   (A) Abnormal value            Lipid Panel     TSH          ECG 12 Lead    Date/Time: 3/9/2023 2:15 PM  Performed by: Jakub Lancaster MD  Authorized by: Jakub Lancaster MD   Comparison: compared with previous ECG from 3/6/2023  Comparison to previous ECG: Ventricular rate is decreased by 47 bpm  Nonspecific T wave abnormality more evident  Rhythm: sinus rhythm and sinus arrhythmia  Conduction: left anterior fascicular block  Other findings comments: Nonspecific T-wave abnormalities    Clinical impression: abnormal EKG            Adult Transthoracic Echo Complete W/ Cont if Necessary Per Protocol (01/31/2023 15:44)  •  Left ventricular systolic function is normal. Left ventricular ejection fraction appears to be 56 - 60%.  •  Left ventricular wall thickness is consistent with mild concentric hypertrophy.  •  Normal right ventricular cavity size and systolic function noted.  •  Mild aortic valve regurgitation is present.  •  Moderate dilation of the aortic root is present. Mild dilation of the sinuses of Valsalva is present. Mild dilation of the descending aorta present.         Assessment and Plan   Diagnoses and all orders for this visit:    1. Paroxysmal atrial fibrillation (HCC) (Primary)  2. Palpitations  - Continue carvedilol 25 mg twice daily  - Continue Eliquis 5 mg twice daily  - Check TSH and 14-day Zio patch    3. Essential hypertension  - BP goal less than  120/80 given ascending aortic aneurysm and history of dissection with emphasis on beta-blocker  - Carvedilol as noted above  - Losartan 75 mg daily  - Amlodipine 5 mg daily  - Patient to keep BP and HR log to bring with her to follow-up    4. History of type B aortic dissection s/p TEVAR  5. Aneurysm of ascending aorta without rupture  - Continue with CT surgery follow-up (Dr. Jefferson) who has plans for surveillance CTA in 6 months  - BP management as noted above  - Plavix 75 mg daily in addition to Eliquis  - Atorvastatin 10 mg daily; I will check a lipid panel today    ADDENDUM  Patient had been referred for atrial fibrillation, but on review of the patient's ECG's in the hospital, there is no documentation of afib. The patient did have an ECG demonstrating SVT. The patient completed her cardiac monitor revealing several episodes of SVT lasting up to almost 5 hours despite carvedilol 25mg BID; there was no significant evidence of afib. Therefore, I am recommending discontinuation of Eliquis and transitioning the patient to aspirin 81mg daily (she will continue plavix 75mg daily). Additionally, I am starting Flecainide 50mg BID and referring the patient to EP for consideration of SVT ablation.      Follow Up   Return in about 2 months (around 5/9/2023).  Patient was given instructions and counseling regarding her condition or for health maintenance advice. Please see specific information pulled into the AVS if appropriate.       EMR Dragon/Transcription disclaimer: Much of this encounter note is an electronic transcription/translation of spoken language to printed text. The electronic translation of spoken language may permit erroneous, or at times, nonsensical words or phrases to be inadvertently transcribed; although I have reviewed the note for such errors, some may still exist.

## 2023-03-10 ENCOUNTER — TELEPHONE (OUTPATIENT)
Dept: CARDIOLOGY | Facility: CLINIC | Age: 58
End: 2023-03-10
Payer: MEDICAID

## 2023-03-10 LAB
CHOLEST SERPL-MCNC: 172 MG/DL (ref 0–200)
HDLC SERPL-MCNC: 69 MG/DL (ref 40–60)
LDLC SERPL CALC-MCNC: 86 MG/DL (ref 0–100)
LDLC/HDLC SERPL: 1.21 {RATIO}
TRIGL SERPL-MCNC: 96 MG/DL (ref 0–150)
TSH SERPL DL<=0.05 MIU/L-ACNC: 2.85 UIU/ML (ref 0.27–4.2)
VLDLC SERPL-MCNC: 17 MG/DL (ref 5–40)

## 2023-03-10 NOTE — TELEPHONE ENCOUNTER
----- Message from Jakub Lancaster MD sent at 3/10/2023 12:06 PM CST -----  Please notify Ms. Zhang of her overall reassuring and stable lab results. Thank you.

## 2023-03-14 ENCOUNTER — OFFICE VISIT (OUTPATIENT)
Dept: VASCULAR SURGERY | Facility: CLINIC | Age: 58
End: 2023-03-14
Payer: MEDICAID

## 2023-03-14 ENCOUNTER — TELEPHONE (OUTPATIENT)
Dept: VASCULAR SURGERY | Facility: CLINIC | Age: 58
End: 2023-03-14
Payer: MEDICAID

## 2023-03-14 VITALS
DIASTOLIC BLOOD PRESSURE: 68 MMHG | OXYGEN SATURATION: 97 % | WEIGHT: 184 LBS | BODY MASS INDEX: 29.57 KG/M2 | SYSTOLIC BLOOD PRESSURE: 118 MMHG | HEART RATE: 88 BPM | HEIGHT: 66 IN

## 2023-03-14 DIAGNOSIS — I71.012 DISSECTION OF DESCENDING THORACIC AORTA: Primary | ICD-10-CM

## 2023-03-14 DIAGNOSIS — I10 ESSENTIAL HYPERTENSION: ICD-10-CM

## 2023-03-14 PROCEDURE — 3078F DIAST BP <80 MM HG: CPT | Performed by: SURGERY

## 2023-03-14 PROCEDURE — 1160F RVW MEDS BY RX/DR IN RCRD: CPT | Performed by: SURGERY

## 2023-03-14 PROCEDURE — 3074F SYST BP LT 130 MM HG: CPT | Performed by: SURGERY

## 2023-03-14 PROCEDURE — 1159F MED LIST DOCD IN RCRD: CPT | Performed by: SURGERY

## 2023-03-14 PROCEDURE — 99024 POSTOP FOLLOW-UP VISIT: CPT | Performed by: SURGERY

## 2023-03-14 NOTE — PROGRESS NOTES
"03/14/2023        Gill Ordoñez MD  [address] [place] [address] [address] [place] [id number]        Shilpa Zhang  1965      Chief Complaint   Patient presents with   • Post-op     1 month post op. Thoracic Aortic Aneurysm repair done 1/31/23. States that left arm is weak where they went in. Otherwise fine.        Dear Gill Ordoñez MD:   I had the pleasure of seeing you patient in the office today for follow up.  As you recall, the patient is a 57 y.o. female who we are currently following for routine health maintenance.  She did undergo TEVAR on 2/2/23. She reports she is feeling well other than fatigue. She is maintained on Eliquis and Plavix.  Her incisions healed without difficulty. She did have noninvasive testing performed which I did personally review.      Review of Systems   Constitutional: Positive for fatigue.   HENT: Negative.    Eyes: Negative.    Respiratory: Negative.    Cardiovascular: Negative.    Gastrointestinal: Negative.    Endocrine: Negative.    Genitourinary: Negative.    Musculoskeletal: Negative.    Skin: Negative.    Allergic/Immunologic: Negative.    Neurological: Positive for weakness.   Hematological: Negative.    Psychiatric/Behavioral: Negative.    All other systems reviewed and are negative.       /68   Pulse 88   Ht 167.6 cm (66\")   Wt 83.5 kg (184 lb)   SpO2 97%   BMI 29.70 kg/m²    Physical Exam  Vitals and nursing note reviewed.   Constitutional:       Appearance: She is well-developed.   HENT:      Head: Normocephalic and atraumatic.   Eyes:      General: No scleral icterus.     Pupils: Pupils are equal, round, and reactive to light.   Neck:      Thyroid: No thyromegaly.      Vascular: No carotid bruit or JVD.   Cardiovascular:      Rate and Rhythm: Normal rate and regular rhythm.      Pulses:           Carotid pulses are 2+ on the right side and 2+ on the left side.       Radial pulses are 2+ on the right side and 2+ on the left side.        Femoral pulses " are 2+ on the right side and 2+ on the left side.       Popliteal pulses are 2+ on the right side and 2+ on the left side.        Dorsalis pedis pulses are 2+ on the right side and 2+ on the left side.        Posterior tibial pulses are 2+ on the right side and 2+ on the left side.      Heart sounds: Normal heart sounds.   Pulmonary:      Effort: Pulmonary effort is normal.      Breath sounds: Normal breath sounds.   Abdominal:      General: Bowel sounds are normal. There is no distension or abdominal bruit.      Palpations: Abdomen is soft. There is no mass.      Tenderness: There is no abdominal tenderness.   Musculoskeletal:         General: Normal range of motion.      Cervical back: Neck supple.   Lymphadenopathy:      Cervical: No cervical adenopathy.   Skin:     General: Skin is warm and dry.   Neurological:      Mental Status: She is alert and oriented to person, place, and time.      Cranial Nerves: No cranial nerve deficit.      Sensory: No sensory deficit.           DIAGNOSTIC DATA:  CT Angiogram Chest, CT Angiogram Abdomen Pelvis    Result Date: 3/6/2023  Narrative: EXAM/TECHNIQUE: 1. CT chest angiography with 3D MIP images with IV contrast 2. CT abdomen and pelvis angiography with 3D MIP images with IV contrast  INDICATION: Dissection of descending thoracic aorta; I71.012-Dissection of descending thoracic aorta  COMPARISON: 02/04/2023  DLP: 1535 mGy cm. Automated exposure control was also utilized to decrease patient radiation dose.  FINDINGS:  Vascular findings:  Postoperative change of type B aortic dissection repair with endograft beginning in the aortic arch and extending into the distal thoracic aorta. Patent LEFT subclavian artery stent. Aortic arch branches are widely patent. Interval decrease in diameter of the descending thoracic aorta aneurysm sac size now measuring 4.5 cm transverse dimension on image 63 of series 10 (previously 5 cm when measured in similar location). There is a type IB  endoleak at the inferior LEFT posterolateral stent.  Dissection flap in the abdominal aorta begins at the distal aspect of the endograft and extends into the LEFT common iliac artery. Patent celiac artery stent. Widely patent SMA arising from the true lumen. JOSE MARIA is patent and appears to arise from the false lumen. Both renal arteries are widely patent. The RIGHT renal artery arises from the true lumen. The LEFT renal artery arises from the false lumen but there appears to be a fenestration through the dissection flap at the level of the LEFT renal artery in the kidneys symmetrically enhance. Iliac arterial vasculature is widely patent.  Nonvascular findings:  Central airways are clear. No consolidation or pleural effusion. Mild atelectasis in the lung bases. No new or enlarging pulmonary nodule. No enlarged thoracic lymph nodes. Chronic dilatation of the main pulmonary artery. No central pulmonary artery filling defect. Cardiomegaly.  No arterial enhancing liver lesion. There is no gallstones or gallbladder wall thickening. No biliary dilatation. Pancreas, spleen, and adrenal glands are unremarkable. No solid renal mass. No urolithiasis or hydronephrosis. No focal urinary bladder wall thickening.  No bowel distention or evidence of active bowel inflammation. No ascites or free pelvic fluid. No pelvic mass or pelvic collection. No enlarged abdominal or pelvic lymph nodes. Ventral abdominal wall hernia mesh repair device. No acute osseous finding.      Impression:  Postoperative change of type B aortic dissection repair with endograft extending from the aortic arch to the diaphragmatic hiatus. Interval decrease in thoracic aneurysm sac size. Type IB endoleak at the distal LEFT lateral stent. Patent LEFT subclavian artery and celiac artery stents. In the abdomen, dissection flap begins at the distal aspect of the thoracic endograft and courses through the abdominal aorta and into the LEFT common iliac artery. Major  abdominal aortic branches appear widely patent and well opacified. This report was finalized on 03/06/2023 13:34 by Dr. Abelardo Green MD.        Patient Active Problem List   Diagnosis   • Dissection of descending thoracic aorta   • Nausea & vomiting   • Aortic dissection (HCC)   • Essential hypertension   • Paroxysmal atrial fibrillation (HCC)     No diagnosis found.      PLAN: After thoroughly evaluating Shilpa Zhang, I believe the best course of action is to remain conservative from a vascular surgery standpoint.  Currently, she appears to be doing quite well without any significant complaints.  Her blood pressure is well controlled.  I did review her CTA which does show a distal type IB endoleak which appears to be coming from the false lumen.  It appears unchanged from previous testing.  We will see her back in 6 months time with repeat testing for continued surveillance. The patient is to continue taking their medications as previously discussed.   This was all discussed in full with complete understanding.  Thank you for allowing me to participate in the care of your patient.  Please do not hesitate to call with any questions or concerns.  We will keep you aware of any further encounters with Shilpa Zhang.      Sincerely Yours,        Jonnathan Farrar, DO

## 2023-03-14 NOTE — TELEPHONE ENCOUNTER
Tried to contact the patient to remind her of her appt tomorrow.  Wasn't available to leave a message.

## 2023-03-28 PROBLEM — I47.1 SVT (SUPRAVENTRICULAR TACHYCARDIA): Status: ACTIVE | Noted: 2023-02-07

## 2023-03-28 PROBLEM — I47.10 SVT (SUPRAVENTRICULAR TACHYCARDIA): Status: ACTIVE | Noted: 2023-02-07

## 2023-03-28 RX ORDER — ASPIRIN 81 MG/1
81 TABLET ORAL DAILY
Qty: 30 TABLET | Refills: 11 | Status: SHIPPED | OUTPATIENT
Start: 2023-03-28

## 2023-03-28 RX ORDER — FLECAINIDE ACETATE 50 MG/1
50 TABLET ORAL 2 TIMES DAILY
Qty: 60 TABLET | Refills: 11 | Status: SHIPPED | OUTPATIENT
Start: 2023-03-28

## 2023-03-29 ENCOUNTER — TELEPHONE (OUTPATIENT)
Dept: CARDIOLOGY | Facility: CLINIC | Age: 58
End: 2023-03-29
Payer: MEDICAID

## 2023-03-29 NOTE — TELEPHONE ENCOUNTER
----- Message from Jakub Lancaster MD sent at 3/28/2023  5:22 PM CDT -----  Regarding: Monitor Results  Ms. Zhang had been referred for atrial fibrillation, but on review of her ECG's in the hospital, there is no documentation of afib. She did have an ECG demonstrating SVT. Her cardiac monitor revealed several episodes of SVT lasting up to almost 5 hours despite carvedilol 25mg BID; there was no significant evidence of afib. Therefore, I am recommending discontinuation of Eliquis and starting aspirin 81mg daily (she will continue plavix 75mg daily). Additionally, I would like to start Flecainide 50mg BID to help prevent SVT episodes. I am also placing a referral to EP to discuss additional options to manage her SVT. Could you please make her aware of these changes/recommendations and have her scheduled for another follow-up with me in the next couple of weeks to further discuss these issues? Thank you!

## 2023-03-29 NOTE — TELEPHONE ENCOUNTER
The patient is called and notified of all and she voices understanding.  Her f/u appointment with Dr. Lancaster is moved up to 4/11/23.  Zaynab Perea MA

## 2023-03-31 ENCOUNTER — PREP FOR SURGERY (OUTPATIENT)
Dept: OTHER | Facility: HOSPITAL | Age: 58
End: 2023-03-31
Payer: MEDICAID

## 2023-03-31 ENCOUNTER — OFFICE VISIT (OUTPATIENT)
Dept: CARDIOLOGY | Facility: CLINIC | Age: 58
End: 2023-03-31
Payer: MEDICAID

## 2023-03-31 VITALS
DIASTOLIC BLOOD PRESSURE: 72 MMHG | BODY MASS INDEX: 30.7 KG/M2 | HEART RATE: 70 BPM | RESPIRATION RATE: 18 BRPM | SYSTOLIC BLOOD PRESSURE: 118 MMHG | OXYGEN SATURATION: 96 % | HEIGHT: 66 IN | WEIGHT: 191 LBS

## 2023-03-31 DIAGNOSIS — I47.1 SVT (SUPRAVENTRICULAR TACHYCARDIA): Primary | ICD-10-CM

## 2023-03-31 RX ORDER — SODIUM CHLORIDE 9 MG/ML
40 INJECTION, SOLUTION INTRAVENOUS AS NEEDED
OUTPATIENT
Start: 2023-03-31

## 2023-03-31 RX ORDER — SODIUM CHLORIDE 0.9 % (FLUSH) 0.9 %
10 SYRINGE (ML) INJECTION AS NEEDED
OUTPATIENT
Start: 2023-03-31

## 2023-03-31 RX ORDER — SODIUM CHLORIDE 0.9 % (FLUSH) 0.9 %
10 SYRINGE (ML) INJECTION EVERY 12 HOURS SCHEDULED
OUTPATIENT
Start: 2023-03-31

## 2023-03-31 NOTE — PROGRESS NOTES
"EP NEW PATIENT VISIT    Chief Complaint  Rapid Heart Rate (NP)    Subjective        History of Present Illness    EP Problems:  1.  Sustained SVT  2.  ? Atrial fibrillation    Cardiology Problems:  1.  Type B aortic dissection s/p TEVAR  2.  Ascending aortic aneurysm  3.  HTN    Medical Problems:  1.  Seizure disorder    Shilpa Zhang is a 57 y.o. female with problem list as above who presents to the clinic for evaluation of sustained SVT, possible atrial fibrillation.  She was recently admitted to the hospital with a type B aortic dissection and underwent endovascular repair by Dr. Jefferson.  She reportedly had an episode of atrial fibrillation which resolved with administration of IV Lopressor.  She was discharged home with cardiology follow-up and saw Dr. Lancaster in clinic.  He ordered a Holter monitor which revealed no evidence of atrial fibrillation but sustained episodes of SVT.  Review of her ECG while in the hospital was more suggestive of SVT as well.  Given these findings, she was referred to EP for further evaluation.  She does endorse fatigue, easy shortness of breath with exertion when she has these episodes.  The episodes are frequent and occur despite her taking beta-blockers.    Objective   Vital Signs:  /72 (BP Location: Right arm, Patient Position: Sitting)   Pulse 70   Resp 18   Ht 167.6 cm (66\")   Wt 86.6 kg (191 lb)   SpO2 96%   BMI 30.83 kg/m²   Estimated body mass index is 30.83 kg/m² as calculated from the following:    Height as of this encounter: 167.6 cm (66\").    Weight as of this encounter: 86.6 kg (191 lb).      Physical Exam  Vitals reviewed.   Constitutional:       Appearance: Normal appearance.   Cardiovascular:      Rate and Rhythm: Normal rate and regular rhythm.      Pulses: Normal pulses.      Heart sounds: Murmur heard.   Pulmonary:      Effort: Pulmonary effort is normal. No respiratory distress.      Breath sounds: Normal breath sounds.   Skin:     General: Skin is warm " and dry.   Neurological:      General: No focal deficit present.      Mental Status: She is alert and oriented to person, place, and time.   Psychiatric:         Mood and Affect: Mood normal.         Judgment: Judgment normal.        Result Review :  The following data was reviewed by: Dianne Love MD on 03/31/2023:  CMP    CMP 2/7/23 3/6/23 3/9/23   Glucose 120 (A)  86   BUN 13  18   Creatinine 0.44 (A) 0.50 (A) 0.42 (A)   eGFR 113.0  114.3   Sodium 139  142   Potassium 3.9  4.3   Chloride 101  104   Calcium 8.9  9.0   BUN/Creatinine Ratio 29.5 (A)  42.9 (A)   Anion Gap 10.0  14.0   (A) Abnormal value       Comments are available for some flowsheets but are not being displayed.           CBC    CBC 2/5/23 2/6/23 2/7/23   WBC 12.07 (A) 11.06 (A) 10.85 (A)   RBC 3.76 (A) 3.74 (A) 3.96   Hemoglobin 11.4 (A) 11.3 (A) 11.9 (A)   Hematocrit 35.6 35.5 38.3   MCV 94.7 94.9 96.7   MCH 30.3 30.2 30.1   MCHC 32.0 31.8 31.1 (A)   RDW 14.3 14.5 14.5   Platelets 172 188 262   (A) Abnormal value            TSH    TSH 3/9/23   TSH 2.850             Prior ECG previously performed on 2/6/2023 was directly visualized and independently interpreted with the following findings: SVT      ECG 12 Lead    Date/Time: 3/31/2023 3:45 PM  Performed by: Dianne Love MD  Authorized by: Dianne Love MD   Comparison: compared with previous ECG from 2/6/2023  Comparison to previous ECG: Sinus rhythm has replaced SVT  Rhythm: sinus rhythm  Rate: normal  Conduction: conduction normal  QRS axis: indeterminate  Other findings: non-specific ST-T wave changes, low voltage and poor R wave progression    Clinical impression: abnormal EKG                Assessment and Plan   Diagnoses and all orders for this visit:    1. SVT (supraventricular tachycardia) (HCC) (Primary)    Other orders  -     ECG 12 Lead        Shilpa Zhang is a 57 y.o. female with problem list as above who presents to the clinic for evaluation of SVT.  She has findings consistent with  sustained SVT refractory to beta blockers.  Given this, I have discussed risks, benefits, and alternatives of an electrophysiology study and ablation for supraventricular tachycardia with the patient.  Alternatives discussed include continued observation and medical management.  An electrophysiology study with ablation is an inherently high risk procedure with possible complications that include but are not limited to vascular access complications, internal bleeding, tamponade requiring pericardiocentesis or cardiac surgery, stroke, MI, embolism, myocardial injury, injury to the normal conduction system requiring a pacemaker, and ultimately death.  We also discussed that given the uncertain nature of the diagnosis, therapeutic efficacy can be variable.  Possibilities of recurrent arrhythmias and the possible need for additional procedures and/or medical therapy was discussed. Questions asked were appropriately answered.  No guarantees were made or implied.   Despite this, they would still like to proceed.    I have not seen any compelling evidence of AF for her otherwise.  I agree with the decision to discontinue anticoagulation unless more compelling evidence of AF or AFL is seen.    Plan:  - Schedule for SVT ablation  - Hold flecainide 48h prior to the procedure  - Will discuss the treatment plan with Dr. Tonny FERRELL Risk: High (high risk procedure decision as above)         Follow Up   Return in about 8 weeks (around 5/26/2023).  Patient was given instructions and counseling regarding her condition or for health maintenance advice. Please see specific information pulled into the AVS if appropriate.     Part of this note may be an electronic transcription/translation of spoken language to printed text using the Dragon Dictation System.

## 2023-04-11 ENCOUNTER — OFFICE VISIT (OUTPATIENT)
Dept: CARDIOLOGY | Facility: CLINIC | Age: 58
End: 2023-04-11
Payer: MEDICAID

## 2023-04-11 VITALS
SYSTOLIC BLOOD PRESSURE: 108 MMHG | HEART RATE: 68 BPM | OXYGEN SATURATION: 93 % | HEIGHT: 66 IN | WEIGHT: 187 LBS | DIASTOLIC BLOOD PRESSURE: 44 MMHG | BODY MASS INDEX: 30.05 KG/M2

## 2023-04-11 DIAGNOSIS — I10 ESSENTIAL HYPERTENSION: ICD-10-CM

## 2023-04-11 DIAGNOSIS — E78.5 HYPERLIPIDEMIA, UNSPECIFIED HYPERLIPIDEMIA TYPE: ICD-10-CM

## 2023-04-11 DIAGNOSIS — I71.21 ANEURYSM OF ASCENDING AORTA WITHOUT RUPTURE: ICD-10-CM

## 2023-04-11 DIAGNOSIS — Z86.79 HISTORY OF AORTIC DISSECTION: ICD-10-CM

## 2023-04-11 DIAGNOSIS — I47.1 SVT (SUPRAVENTRICULAR TACHYCARDIA): Primary | ICD-10-CM

## 2023-04-11 PROCEDURE — 93000 ELECTROCARDIOGRAM COMPLETE: CPT | Performed by: HOSPITALIST

## 2023-04-11 PROCEDURE — 99214 OFFICE O/P EST MOD 30 MIN: CPT | Performed by: HOSPITALIST

## 2023-04-11 PROCEDURE — 3074F SYST BP LT 130 MM HG: CPT | Performed by: HOSPITALIST

## 2023-04-11 PROCEDURE — 3078F DIAST BP <80 MM HG: CPT | Performed by: HOSPITALIST

## 2023-04-11 RX ORDER — ATORVASTATIN CALCIUM 20 MG/1
20 TABLET, FILM COATED ORAL DAILY
Qty: 90 TABLET | Refills: 3 | Status: SHIPPED | OUTPATIENT
Start: 2023-04-11

## 2023-04-11 NOTE — PROGRESS NOTES
Reason For Visit:  Follow-up SVT    Subjective        Shilpa Zhang is a 57 y.o. female with a PMH of type B aortic dissection s/p TEVAR, ascending aortic aneurysm, and SVT who presents for follow-up of her cardiovascular disease.    Patient reports that she has been doing quite well since her last visit.  Since starting flecainide, she has had no recurrent episodes of palpitations.  She does have some itching and intermittent redness in various locations that has been present for several months, which she plans to discuss with her PCP.  She denies any chest pain, shortness of breath, lower extremity swelling, orthopnea.  She has occasional lightheadedness with standing that has been chronic and overall stable.  She brings in a BP log that has SBP primarily 120s to 130s (occasionally in the 110s) with DBP mostly 50s to 60s.    Prior history:  Briefly, the patient was admitted at the end of January for a type B aortic dissection with retrograde dissection back to the left subclavian origin and distal dissection through the iliac arteries.  She underwent endovascular repair by Dr. Jefferson.  She had a postop tachyarrhythmia that resolved with IV metoprolol and was referred to cardiology for suspected atrial fibrillation.  On review of her ECGs during admission and a follow-up cardiac monitor, it was found that she had primarily SVT with no evidence of atrial fibrillation.  Cardiac monitor revealed significant SVT episodes despite high-dose beta-blockers.  Her Eliquis was stopped, and she was transitioned to aspirin while continuing Plavix.  She was also started on flecainide 50 mg twice daily and referred to EP who have scheduled an ablation for early May.    ROS: Pertinent findings are included above.    Pertinent past medical, surgical, family, and social history were reviewed.      Current Outpatient Medications:   •  amLODIPine (NORVASC) 5 MG tablet, Take 1 tablet by mouth Daily., Disp: 30 tablet, Rfl: 2  •  aspirin 81  "MG EC tablet, Take 1 tablet by mouth Daily., Disp: 30 tablet, Rfl: 11  •  atorvastatin (LIPITOR) 10 MG tablet, Take 1 tablet by mouth Daily., Disp: , Rfl:   •  carvedilol (COREG) 12.5 MG tablet, Take 2 tablets by mouth 2 (Two) Times a Day With Meals., Disp: 60 tablet, Rfl: 2  •  clopidogrel (PLAVIX) 75 MG tablet, Take 1 tablet by mouth Daily., Disp: 30 tablet, Rfl: 2  •  flecainide (TAMBOCOR) 50 MG tablet, Take 1 tablet by mouth 2 (Two) Times a Day., Disp: 60 tablet, Rfl: 11  •  FLUoxetine (PROzac) 40 MG capsule, Take 1 capsule by mouth Daily., Disp: , Rfl:   •  furosemide (LASIX) 20 MG tablet, Take 1 tablet by mouth As Needed., Disp: , Rfl:   •  losartan (COZAAR) 50 MG tablet, Take 1.5 tablets by mouth Daily., Disp: 45 tablet, Rfl: 2  •  phenytoin ER (DILANTIN) 100 MG capsule, Take 5 capsules by mouth Every Night., Disp: , Rfl:      Objective   Vital Signs:  /44   Pulse 68   Ht 167.6 cm (66\")   Wt 84.8 kg (187 lb)   SpO2 93%   BMI 30.18 kg/m²   Estimated body mass index is 30.18 kg/m² as calculated from the following:    Height as of this encounter: 167.6 cm (66\").    Weight as of this encounter: 84.8 kg (187 lb).      Constitutional:       Appearance: Healthy appearance. Not in distress.   Neck:      Vascular: JVD normal.   Pulmonary:      Effort: Pulmonary effort is normal.      Breath sounds: Normal breath sounds.   Cardiovascular:      Normal rate. Regular rhythm.      Murmurs: There is a grade 2/6 systolic murmur at the LLSB.      No gallop. No click. No rub.   Edema:     Peripheral edema absent.   Abdominal:      General: There is no distension.      Palpations: Abdomen is soft.      Tenderness: There is no abdominal tenderness.   Skin:     General: Skin is warm and dry.   Neurological:      Mental Status: Alert and oriented to person, place and time.        Result Review :           ECG 12 Lead    Date/Time: 4/11/2023 11:24 AM  Performed by: Jakub Lancaster MD  Authorized by: Jakub Lancaster MD "   Comparison: compared with previous ECG from 3/31/2023  Comparison to previous ECG: QRS axis has shifted left, NSTWA now present  Rhythm: sinus rhythm  Conduction: left anterior fascicular block  Other findings comments: nonspecific T wave abnormalities    Clinical impression: abnormal EKG           Previously reviewed studies:  Holter Monitor - 72 Hour Up To 15 Days (03/09/2023 16:16)  •  An abnormal monitor study.  •  400 episodes of SVT lasting up to 4 hours and 49 minutes. Patient triggers and diary entries correlated primarily to SVT.  •  2 episodes of wide complex tachycardia (SVT with aberrancy versus NSVT) lasting up to 4 beats that were not associated with patient triggers.  •  Occasional PAC's (3.9%).  •  No afib detected.    Adult Transthoracic Echo Complete W/ Cont if Necessary Per Protocol (01/31/2023 15:44)  •  Left ventricular systolic function is normal. Left ventricular ejection fraction appears to be 56 - 60%.  •  Left ventricular wall thickness is consistent with mild concentric hypertrophy.  •  Normal right ventricular cavity size and systolic function noted.  •  Mild aortic valve regurgitation is present.  •  Moderate dilation of the aortic root is present. Mild dilation of the sinuses of Valsalva is present. Mild dilation of the descending aorta present.        Assessment and Plan   Diagnoses and all orders for this visit:    1. SVT (supraventricular tachycardia) (Primary)  - Carvedilol 25 mg twice daily  - Flecainide 50 mg twice daily  - Continue follow-up with the EP with ablation planned for May    2. Aneurysm of ascending aorta without rupture  3. History of aortic dissection  4. Essential hypertension  BP goal less than 120/80 with emphasis on beta-blocker.  BP has consistently been at goal in clinic although is elevated on home log.  Discussed increasing amlodipine, but patient preferred to monitor BP at home and bring her BP cuff into her follow-up visit.  - Continue carvedilol 25 mg  twice daily  - Continue amlodipine 5 mg daily  - Continue losartan 50 mg BID  - Follow-up in 6 weeks for BP recheck with comparison of home cuff; plan to increase amlodipine if BP above goal    5. Hyperlipidemia, unspecified hyperlipidemia type  -Increase atorvastatin to 20 mg daily    Follow Up   Return in about 6 weeks (around 5/23/2023).  Patient was given instructions and counseling regarding her condition or for health maintenance advice. Please see specific information pulled into the AVS if appropriate.       EMR Dragon/Transcription disclaimer: Much of this encounter note is an electronic transcription/translation of spoken language to printed text. The electronic translation of spoken language may permit erroneous, or at times, nonsensical words or phrases to be inadvertently transcribed; although I have reviewed the note for such errors, some may still exist.

## 2023-05-22 RX ORDER — CLOPIDOGREL BISULFATE 75 MG/1
75 TABLET ORAL DAILY
Qty: 30 TABLET | Refills: 2 | Status: SHIPPED | OUTPATIENT
Start: 2023-05-22

## 2023-05-22 NOTE — TELEPHONE ENCOUNTER
Caller: Shilpa Zhang    Relationship: Self    Best call back number:     Requested Prescriptions:   Requested Prescriptions     Pending Prescriptions Disp Refills   • clopidogrel (PLAVIX) 75 MG tablet 30 tablet 2     Sig: Take 1 tablet by mouth Daily.        Pharmacy where request should be sent:  [facility]    Last office visit with prescribing clinician: 3/6/2023   Last telemedicine visit with prescribing clinician: 3/6/2023   Next office visit with prescribing clinician: 9/7/2023     Additional details provided by patient: COMPLETELY OUT OF MEDICATION     Does the patient have less than a 3 day supply:  [x] Yes  [] No    Would you like a call back once the refill request has been completed: [x] Yes [] No    If the office needs to give you a call back, can they leave a voicemail: [x] Yes [] No    Estefania Donahue Rep   05/22/23 12:22 CDT

## 2023-05-25 ENCOUNTER — OFFICE VISIT (OUTPATIENT)
Dept: CARDIOLOGY | Facility: CLINIC | Age: 58
End: 2023-05-25
Payer: MEDICAID

## 2023-05-25 ENCOUNTER — TELEPHONE (OUTPATIENT)
Dept: CARDIOLOGY | Facility: CLINIC | Age: 58
End: 2023-05-25

## 2023-05-25 VITALS
OXYGEN SATURATION: 98 % | WEIGHT: 192 LBS | SYSTOLIC BLOOD PRESSURE: 120 MMHG | DIASTOLIC BLOOD PRESSURE: 62 MMHG | HEIGHT: 66 IN | HEART RATE: 90 BPM | BODY MASS INDEX: 30.86 KG/M2 | RESPIRATION RATE: 18 BRPM

## 2023-05-25 DIAGNOSIS — I10 ESSENTIAL HYPERTENSION: ICD-10-CM

## 2023-05-25 DIAGNOSIS — Z86.79 HISTORY OF AORTIC DISSECTION: ICD-10-CM

## 2023-05-25 DIAGNOSIS — E78.5 HYPERLIPIDEMIA, UNSPECIFIED HYPERLIPIDEMIA TYPE: ICD-10-CM

## 2023-05-25 DIAGNOSIS — I71.21 ANEURYSM OF ASCENDING AORTA WITHOUT RUPTURE: ICD-10-CM

## 2023-05-25 DIAGNOSIS — I47.1 SVT (SUPRAVENTRICULAR TACHYCARDIA): Primary | ICD-10-CM

## 2023-05-25 NOTE — TELEPHONE ENCOUNTER
Caller: Shilpa Zhang    Relationship: Self    Best call back number: [phone]    What is the best time to reach you: ANYTIME      What was the call regarding: PT STATES THAT NIEVES SAXENA REQUESTED SHE CONFIRMED HER PRESCRIPTION ON HER BOTTLE FOR FLECAINIDE. SHE SAID IT'S 50MG TWICE A DAY. THIS IS WHAT IS REFLECTED IN HER CHART.    Do you require a callback: IF NEEDED

## 2023-05-25 NOTE — PROGRESS NOTES
Reason For Visit:  Rapid Heart Rate (6 wk fu )     Subjective        Shilpa Zhang is a 57 y.o. female with the below pertinent PMH who presents for follow-up of cardiovascular disease.    Since her previous appointment she is doing well.  She denies having any palpitations or fluttering in her chest.  She says she did not make her ablation appointment because she was acutely ill that day, however she is not interested in rescheduling at this time since her symptoms have resolved.  She is still taking the flecainide and carvedilol and tolerating this well.  There are some question about what dosage of carvedilol she is taking, and appears to 12.5 mg tablets were called out and she has been taking 1 tablet twice a day.  She denies any chest pain  She denies any dyspnea on exertion  Her blood pressure machine at home has not been consistent with her readings.      ROS normal except as otherwise noted above    Pertinent PMH  -Type B aortic dissection status post TEVAR January 2023  -Ascending aortic aneurysm  - SVT  - Hypertension  - Hyperlipidemia    Pertinent past medical, surgical, family, and social history were reviewed.      Current Outpatient Medications:     amLODIPine (NORVASC) 5 MG tablet, Take 1 tablet by mouth Daily., Disp: 30 tablet, Rfl: 2    aspirin 81 MG EC tablet, Take 1 tablet by mouth Daily., Disp: 30 tablet, Rfl: 11    atorvastatin (LIPITOR) 20 MG tablet, Take 1 tablet by mouth Daily., Disp: 90 tablet, Rfl: 3    carvedilol (COREG) 12.5 MG tablet, Take 2 tablets by mouth 2 (Two) Times a Day With Meals., Disp: 60 tablet, Rfl: 2    clopidogrel (PLAVIX) 75 MG tablet, Take 1 tablet by mouth Daily., Disp: 30 tablet, Rfl: 2    flecainide (TAMBOCOR) 50 MG tablet, Take 1 tablet by mouth 2 (Two) Times a Day., Disp: 60 tablet, Rfl: 11    FLUoxetine (PROzac) 40 MG capsule, Take 1 capsule by mouth Daily., Disp: , Rfl:     furosemide (LASIX) 20 MG tablet, Take 1 tablet by mouth As Needed., Disp: , Rfl:      "losartan (COZAAR) 50 MG tablet, Take 1.5 tablets by mouth Daily. (Patient taking differently: Take 1 tablet by mouth 2 (Two) Times a Day.), Disp: 45 tablet, Rfl: 2    phenytoin ER (DILANTIN) 100 MG capsule, Take 5 capsules by mouth Every Night., Disp: , Rfl:      Objective   Vital Signs:  /62 (BP Location: Right arm, Patient Position: Sitting)   Pulse 90   Resp 18   Ht 167.6 cm (66\")   Wt 87.1 kg (192 lb)   SpO2 98%   BMI 30.99 kg/m²   Estimated body mass index is 30.99 kg/m² as calculated from the following:    Height as of this encounter: 167.6 cm (66\").    Weight as of this encounter: 87.1 kg (192 lb).      Constitutional:       Appearance: Healthy appearance. Not in distress.   Pulmonary:      Effort: Pulmonary effort is normal.      Breath sounds: Normal breath sounds.   Cardiovascular:      PMI at left midclavicular line. Normal rate. Regular rhythm.      Murmurs: There is no murmur.      No gallop.  No click. No rub.   Edema:     Peripheral edema absent.   Abdominal:      General: Bowel sounds are normal.   Musculoskeletal: Normal range of motion.      Cervical back: Normal range of motion and neck supple. Skin:     General: Skin is warm.   Neurological:      Mental Status: Alert and oriented to person, place and time.      Result Review :  The following data was reviewed by: ARTI Barragan on 05/25/2023:  CMP   CMP          2/7/2023    04:43 3/6/2023    11:24 3/9/2023    15:12   CMP   Glucose 120    86     BUN 13    18     Creatinine 0.44   0.50   0.42     EGFR 113.0    114.3     Sodium 139    142     Potassium 3.9    4.3     Chloride 101    104     Calcium 8.9    9.0     BUN/Creatinine Ratio 29.5    42.9     Anion Gap 10.0    14.0       Lipid Panel   Lipid Panel          3/9/2023    15:12   Lipid Panel   Total Cholesterol 172     Triglycerides 96     HDL Cholesterol 69     VLDL Cholesterol 17     LDL Cholesterol  86     LDL/HDL Ratio 1.21       TSH   TSH          3/9/2023    15:12   TSH "   TSH 2.850       BMP   BMP          2/7/2023    04:43 3/6/2023    11:24 3/9/2023    15:12   BMP   BUN 13    18     Creatinine 0.44   0.50   0.42     Sodium 139    142     Potassium 3.9    4.3     Chloride 101    104     CO2 28.0    24.0     Calcium 8.9    9.0       A1c   Most Recent A1C          1/31/2023    14:06   HGBA1C Most Recent   Hemoglobin A1C 5.30       Data reviewed : Consultant notes EP notes      ECG 12 Lead    Date/Time: 5/25/2023 11:44 AM  Performed by: Aly Vera APRN  Authorized by: Aly Vera APRN   Comparison: compared with previous ECG from 4/11/2023  Similar to previous ECG  Comparison to previous ECG: Normal sinus rhythm  Rhythm: sinus rhythm  Rate: normal  Conduction: conduction normal          Assessment and Plan   Diagnoses and all orders for this visit:    1. SVT (supraventricular tachycardia) (Primary)  -Patient should be on carvedilol 25 mg twice daily, patient is going home to clarify the dose that she is taking  -Flecainide 50 mg twice daily  - We will have patient follow-up with the EP clinic to keep care established in case future ablation is required    2. Aneurysm of ascending aorta without rupture  3. History of aortic dissection  4. Essential hypertension  -Normotensive in the clinic today.  -Continue carvedilol, amlodipine, and losartan  - We will see the patient back in 2 months for recheck and I told her to obtain a new blood pressure cuff so that she can watch her closely at home    5. Hyperlipidemia, unspecified hyperlipidemia type  -Tolerating atorvastatin 20 mg well.           I spent 2 minutes on the separately reported service of EKG. This time is not included in the time used to support the E/M service also reported today.      Follow Up   Return in about 8 weeks (around 7/20/2023).  Patient was given instructions and counseling regarding her condition or for health maintenance advice. Please see specific information pulled into the AVS if appropriate.        EMR Dragon/Transcription disclaimer: Much of this encounter note is an electronic transcription/translation of spoken language to printed text. The electronic translation of spoken language may permit erroneous, or at times, nonsensical words or phrases to be inadvertently transcribed; although I have reviewed the note for such errors, some may still exist.

## 2023-05-25 NOTE — PATIENT INSTRUCTIONS
Check your Carvedilol dosage. You should be taking 25mg twice a day. We will contact you later about the appointment next week.

## 2023-05-26 ENCOUNTER — TELEPHONE (OUTPATIENT)
Dept: CARDIOLOGY | Facility: CLINIC | Age: 58
End: 2023-05-26

## 2023-05-26 NOTE — TELEPHONE ENCOUNTER
Caller: Shilpa Zhang    Relationship: Self    Best call back number: [phone]    What is the best time to reach you: ANY    Who are you requesting to speak with (clinical staff, provider,  specific staff member): NIEVES SAXENA    What was the call regarding: PT IS RETURNING HIS CALL.   HUB ATTEMPTED WT.    Do you require a callback: YES

## 2023-05-30 ENCOUNTER — OFFICE VISIT (OUTPATIENT)
Dept: CARDIOLOGY | Facility: CLINIC | Age: 58
End: 2023-05-30

## 2023-05-30 VITALS
WEIGHT: 196 LBS | SYSTOLIC BLOOD PRESSURE: 130 MMHG | DIASTOLIC BLOOD PRESSURE: 50 MMHG | HEART RATE: 79 BPM | HEIGHT: 66 IN | BODY MASS INDEX: 31.5 KG/M2

## 2023-05-30 DIAGNOSIS — I47.1 SVT (SUPRAVENTRICULAR TACHYCARDIA): Primary | ICD-10-CM

## 2023-05-30 DIAGNOSIS — I71.012 DISSECTION OF DESCENDING THORACIC AORTA: ICD-10-CM

## 2023-05-30 RX ORDER — HYDROXYZINE HYDROCHLORIDE 10 MG/1
10 TABLET, FILM COATED ORAL 3 TIMES DAILY PRN
COMMUNITY

## 2023-05-30 NOTE — PROGRESS NOTES
"[facility] -  CLINIC FOLLOW UP     Patient Care Team:  Gill Ordoñez MD as PCP - General (Family Medicine)  Jefferson, Riley TREVIZO MD as Consulting Physician (Cardiothoracic Surgery)    Chief Complaint:follow up on SVT     Subjective   EP Problems:  1.  Sustained SVT  2.  ? Atrial fibrillation     Cardiology Problems:  1.  Type B aortic dissection s/p TEVAR  2.  Ascending aortic aneurysm  3.  HTN     Medical Problems:  1.  Seizure disorder    HPI: Today I had the pleasure of seeing Shilpa Zhang in the cardiology clinic for follow up. She is a pleasant 57 year old female with a history of sustained SVT initially thought to be atrial fibrillation after her endovascular repair of type B aortic dissection. A follow up Holter monitor did not show evidence of afib but rather clear sustained SVT. Dr. Love felt her EKGs in hospital were more suggestive of SVT as well.     She was placed on Flecainide in preparation for ablation and taken off of anticoagulation. Due to HTN and her aortic dissection, she has been maintained on carvedilol, amlodipine, and losartan. Unfortunately, she was unable to keep her ablation procedure appointment due to becoming ill. However, she is not interested in rescheduling and feels that Flecainide is controlling her arrhythmia just fine. EKG today shows NSR with QRS duration of 82ms.     Objective     Visit Vitals  /50   Pulse 79   Ht 167.6 cm (66\")   Wt 88.9 kg (196 lb)   BMI 31.64 kg/m²           Vitals reviewed.   Constitutional:       Appearance: Healthy appearance. Not in distress.   Eyes:      Extraocular Movements: Extraocular movements intact.      Conjunctiva/sclera: Conjunctivae normal.      Pupils: Pupils are equal, round, and reactive to light.   HENT:      Head: Normocephalic and atraumatic.      Nose: Nose normal.    Mouth/Throat:      Lips: Pink.      Mouth: Mucous membranes are moist.      Pharynx: Oropharynx is clear.   Neck:      Vascular: No " carotid bruit or JVD. JVD normal.   Pulmonary:      Effort: Pulmonary effort is normal.      Breath sounds: Normal breath sounds.   Chest:      Chest wall: Not tender to palpatation.   Cardiovascular:      PMI at left midclavicular line. Normal rate. Regular rhythm. Normal S1. Normal S2.       Murmurs: There is no murmur.      No gallop.  No rub.   Pulses:     Radial: 2+ bilaterally.     Posterior tibial: 2+ bilaterally.  Edema:     Peripheral edema absent.   Abdominal:      General: Bowel sounds are normal.      Palpations: Abdomen is soft.   Musculoskeletal: Normal range of motion.      Extremities: No clubbing present.     Cervical back: Normal range of motion. Skin:     General: Skin is warm and dry.   Neurological:      General: No focal deficit present.      Mental Status: Alert and oriented to person, place, and time.      Cranial Nerves: Cranial nerves are intact.   Psychiatric:         Attention and Perception: Attention normal.         Mood and Affect: Affect normal.         Speech: Speech normal.         Behavior: Behavior normal.         Cognition and Memory: Cognition normal.           The following portions of the patient's history were reviewed and updated as appropriate: allergies, current medications, past medical history, past social history, past and problem list.     Review of Systems   Constitutional: Negative.    HENT: Negative.     Eyes: Negative.    Respiratory: Negative.     Cardiovascular: Negative.    Gastrointestinal: Negative.    Endocrine: Negative.    Genitourinary: Negative.    Musculoskeletal: Negative.    Skin: Negative.    Allergic/Immunologic: Negative.    Neurological: Negative.    Hematological: Negative.    Psychiatric/Behavioral: Negative.                ECG 12 Lead    Date/Time: 5/30/2023 11:54 AM  Performed by: Rebeca Justice PA  Authorized by: Rebeca Justice PA   Comparison: compared with previous ECG from 3/6/2023  Rhythm: sinus rhythm  Rate: normal  QRS axis:  normal          Medication Review: yes    Current Outpatient Medications:     amLODIPine (NORVASC) 5 MG tablet, Take 1 tablet by mouth Daily., Disp: 30 tablet, Rfl: 2    aspirin 81 MG EC tablet, Take 1 tablet by mouth Daily., Disp: 30 tablet, Rfl: 11    atorvastatin (LIPITOR) 20 MG tablet, Take 1 tablet by mouth Daily., Disp: 90 tablet, Rfl: 3    carvedilol (COREG) 12.5 MG tablet, Take 2 tablets by mouth 2 (Two) Times a Day With Meals., Disp: 60 tablet, Rfl: 2    clopidogrel (PLAVIX) 75 MG tablet, Take 1 tablet by mouth Daily., Disp: 30 tablet, Rfl: 2    flecainide (TAMBOCOR) 50 MG tablet, Take 1 tablet by mouth 2 (Two) Times a Day., Disp: 60 tablet, Rfl: 11    FLUoxetine (PROzac) 40 MG capsule, Take 60 mg by mouth Daily., Disp: , Rfl:     furosemide (LASIX) 20 MG tablet, Take 1 tablet by mouth As Needed., Disp: , Rfl:     hydrOXYzine (ATARAX) 10 MG tablet, Take 1 tablet by mouth 3 (Three) Times a Day As Needed for Itching., Disp: , Rfl:     losartan (COZAAR) 50 MG tablet, Take 1.5 tablets by mouth Daily. (Patient taking differently: Take 1 tablet by mouth 2 (Two) Times a Day.), Disp: 45 tablet, Rfl: 2    phenytoin ER (DILANTIN) 100 MG capsule, Take 5 capsules by mouth Every Night., Disp: , Rfl:    No Known Allergies    I have reviewed       CBC:  Lab Results - Last 18 Months   Lab Units 02/07/23  0443   WBC 10*3/mm3 10.85*   HEMOGLOBIN g/dL 11.9*   HEMATOCRIT % 38.3   PLATELETS 10*3/mm3 262      BMP/CMP:  Lab Results - Last 18 Months   Lab Units 03/09/23  1512   SODIUM mmol/L 142   POTASSIUM mmol/L 4.3   CHLORIDE mmol/L 104   CO2 mmol/L 24.0   GLUCOSE mg/dL 86   BUN mg/dL 18   CREATININE mg/dL 0.42*   CALCIUM mg/dL 9.0     BNP: No results for input(s): PROBNP in the last 99824 hours.   THYROID:  Lab Results - Last 18 Months   Lab Units 03/09/23  1512   TSH uIU/mL 2.850       Results for orders placed during the hospital encounter of 01/31/23    Adult Transthoracic Echo Complete W/ Cont if Necessary Per  Protocol    Interpretation Summary    Left ventricular systolic function is normal. Left ventricular ejection fraction appears to be 56 - 60%.    Left ventricular wall thickness is consistent with mild concentric hypertrophy.    Normal right ventricular cavity size and systolic function noted.    Mild aortic valve regurgitation is present.    Moderate dilation of the aortic root is present. Mild dilation of the sinuses of Valsalva is present. Mild dilation of the descending aorta present.     Assessment:   Diagnoses and all orders for this visit:    1. SVT (supraventricular tachycardia) (Primary)    2. Dissection of descending thoracic aorta    Other orders  -     ECG 12 Lead    SVT: Controlled with Flecainide, carvedilol. She denies any tachypalpitations or presyncope. She would like to continue with current medication management and if symptoms increase, she will let us know to pursue ablation.     Previous dissection of aorta: Followed by Dr. Lancaster, continue losartan, carvedilol, amlodipine and DAPT.     I spent 30 minutes caring for Shilpa on this date of service. This time includes time spent by me in the following activities:preparing for the visit, reviewing tests, obtaining and/or reviewing a separately obtained history, performing a medically appropriate examination and/or evaluation , counseling and educating the patient/family/caregiver, ordering medications, tests, or procedures, referring and communicating with other health care professionals , documenting information in the medical record, and independently interpreting results and communicating that information with the patient/family/caregiver        Electronically signed by FREDDIE Montana

## 2023-07-25 RX ORDER — CLOPIDOGREL BISULFATE 75 MG/1
75 TABLET ORAL DAILY
Qty: 30 TABLET | Refills: 2 | Status: SHIPPED | OUTPATIENT
Start: 2023-07-25

## 2023-07-27 ENCOUNTER — OFFICE VISIT (OUTPATIENT)
Dept: CARDIOLOGY | Facility: CLINIC | Age: 58
End: 2023-07-27
Payer: MEDICAID

## 2023-07-27 VITALS
WEIGHT: 193 LBS | BODY MASS INDEX: 31.02 KG/M2 | OXYGEN SATURATION: 99 % | HEART RATE: 67 BPM | DIASTOLIC BLOOD PRESSURE: 54 MMHG | SYSTOLIC BLOOD PRESSURE: 158 MMHG | HEIGHT: 66 IN

## 2023-07-27 DIAGNOSIS — I10 ESSENTIAL HYPERTENSION: ICD-10-CM

## 2023-07-27 DIAGNOSIS — E78.5 HYPERLIPIDEMIA, UNSPECIFIED HYPERLIPIDEMIA TYPE: ICD-10-CM

## 2023-07-27 DIAGNOSIS — I26.99 ACUTE PULMONARY EMBOLISM, UNSPECIFIED PULMONARY EMBOLISM TYPE, UNSPECIFIED WHETHER ACUTE COR PULMONALE PRESENT: ICD-10-CM

## 2023-07-27 DIAGNOSIS — I71.21 ANEURYSM OF ASCENDING AORTA WITHOUT RUPTURE: ICD-10-CM

## 2023-07-27 DIAGNOSIS — Z86.79 HISTORY OF AORTIC DISSECTION: ICD-10-CM

## 2023-07-27 DIAGNOSIS — I47.1 SVT (SUPRAVENTRICULAR TACHYCARDIA): Primary | ICD-10-CM

## 2023-07-27 RX ORDER — FLUOXETINE HYDROCHLORIDE 20 MG/1
20 CAPSULE ORAL DAILY
COMMUNITY
Start: 2023-07-05 | End: 2023-07-27

## 2023-07-27 RX ORDER — ATORVASTATIN CALCIUM 10 MG/1
1 TABLET, FILM COATED ORAL DAILY
COMMUNITY
Start: 2023-06-04 | End: 2023-07-27

## 2023-07-27 RX ORDER — LOSARTAN POTASSIUM 100 MG/1
100 TABLET ORAL
Qty: 90 TABLET | Refills: 3 | Status: SHIPPED | OUTPATIENT
Start: 2023-07-27

## 2023-07-27 RX ORDER — ATORVASTATIN CALCIUM 20 MG/1
30 TABLET, FILM COATED ORAL DAILY
Qty: 90 TABLET | Refills: 3
Start: 2023-07-27

## 2023-07-27 RX ORDER — APIXABAN 5 MG/1
TABLET, FILM COATED ORAL
COMMUNITY
Start: 2023-07-05

## 2023-07-27 RX ORDER — CARVEDILOL 25 MG/1
25 TABLET ORAL 2 TIMES DAILY WITH MEALS
Qty: 180 TABLET | Refills: 3
Start: 2023-07-27

## 2023-07-27 NOTE — PATIENT INSTRUCTIONS
Atorvastatin. Check and see the dosage you are taking.   Check your blood pressure daily. Goal is the top number being less than 120.   Increased losartan to 100mg daily

## 2023-07-27 NOTE — PROGRESS NOTES
Reason For Visit:  Rapid Heart Rate (8w f/u  patient states only some positional dizziness that resolves quickly, controlled with medications ) and DISSECTION OF AORTA (TREATED WITH MEDICATIONS)     Subjective        Shilpa Zhang is a 58 y.o. female with the below pertinent PMH who presents for follow-up of cardiovascular disease.    Patient was last seen 5/25/2023.  At that time she was normotensive in the clinic.  She had minimal complaints.  We maintained her on her Coreg 25 mg twice daily as well as her flecainide 50 mg twice daily.  After that appointment she saw electrophysiology as well.  She tell them that she is only interested in medical management currently for SVT and does not want to pursue ablation at this time.    Since her last appointment she had a fall at home that led to her being hospitalized after he was discovered she had fractured ribs.  It was also found during her work-up that she had pulmonary embolism.  She was told this is most likely due to her fall and lack of activity afterwards.  Her aspirin was stopped and she was started on Eliquis and maintained on her Plavix.  She also had some adjustments made to her blood pressure medicines and statin therapy.    In regards to her heart health she says she has not had any palpitations or irregular beats in some time.  Her only complaint today really is fatigue, which has been persistent since initiation of beta-blocker therapy.      ROS: Pertinent findings as noted above    Pertinent PMH  -Type B aortic dissection status post TEVAR January 2023  -Ascending aortic aneurysm  - SVT  - Hypertension  - Hyperlipidemia    Pertinent past medical, surgical, family, and social history were reviewed.      Current Outpatient Medications:     amLODIPine (NORVASC) 5 MG tablet, Take 1 tablet by mouth Daily., Disp: 30 tablet, Rfl: 2    atorvastatin (LIPITOR) 20 MG tablet, Take 1.5 tablets by mouth Daily., Disp: 90 tablet, Rfl: 3    carvedilol (COREG) 25 MG  "tablet, Take 1 tablet by mouth 2 (Two) Times a Day With Meals., Disp: 180 tablet, Rfl: 3    clopidogrel (PLAVIX) 75 MG tablet, TAKE 1 TABLET BY MOUTH DAILY, Disp: 30 tablet, Rfl: 2    Eliquis 5 MG tablet tablet, TAKE 1 TABLET BY MOUTH TWICE DAILY FOR 23 DAYS, Disp: , Rfl:     flecainide (TAMBOCOR) 50 MG tablet, Take 1 tablet by mouth 2 (Two) Times a Day., Disp: 60 tablet, Rfl: 11    FLUoxetine (PROzac) 40 MG capsule, Take 60 mg by mouth Daily., Disp: , Rfl:     furosemide (LASIX) 20 MG tablet, Take 1 tablet by mouth As Needed., Disp: , Rfl:     hydrOXYzine (ATARAX) 10 MG tablet, Take 1 tablet by mouth 3 (Three) Times a Day As Needed for Itching., Disp: , Rfl:     losartan (COZAAR) 100 MG tablet, Take 1 tablet by mouth Daily., Disp: 90 tablet, Rfl: 3    phenytoin ER (DILANTIN) 100 MG capsule, Take 5 capsules by mouth Every Night., Disp: , Rfl:      Objective   Vital Signs:  /54 (BP Location: Right arm, Patient Position: Sitting, Cuff Size: Adult)   Pulse 67   Ht 167.6 cm (66\")   Wt 87.5 kg (193 lb)   SpO2 99%   BMI 31.15 kg/m²   Estimated body mass index is 31.15 kg/m² as calculated from the following:    Height as of this encounter: 167.6 cm (66\").    Weight as of this encounter: 87.5 kg (193 lb).      Constitutional:       Appearance: Healthy appearance. Not in distress.   Pulmonary:      Effort: Pulmonary effort is normal.      Breath sounds: Normal breath sounds.   Cardiovascular:      PMI at left midclavicular line. Normal rate. Regular rhythm.      Murmurs: There is no murmur.      No gallop.  No click. No rub.   Edema:     Peripheral edema absent.   Abdominal:      General: Bowel sounds are normal.   Musculoskeletal: Normal range of motion.      Cervical back: Normal range of motion and neck supple. Skin:     General: Skin is warm.   Neurological:      Mental Status: Alert and oriented to person, place and time.      Result Review :  The following data was reviewed by: ARTI Barragan on " 07/27/2023:  Lipid Panel   Lipid Panel          3/9/2023    15:12   Lipid Panel   Total Cholesterol 172    Triglycerides 96    HDL Cholesterol 69    VLDL Cholesterol 17    LDL Cholesterol  86    LDL/HDL Ratio 1.21      BMP   BMP          2/7/2023    04:43 3/6/2023    11:24 3/9/2023    15:12   BMP   BUN 13   18    Creatinine 0.44  0.50  0.42    Sodium 139   142    Potassium 3.9   4.3    Chloride 101   104    CO2 28.0   24.0    Calcium 8.9   9.0      BNP No results for input(s): BNP in the last 38301 hours.           Assessment and Plan   Diagnoses and all orders for this visit:    1. SVT (supraventricular tachycardia) (Primary)  -Patient has no complaints of irregular rhythm at this time  - Continue carvedilol 25 mg twice daily as well as flecainide 50 mg twice daily  - Patient has follow-up with electrophysiology later this year      2. Aneurysm of ascending aorta without rupture  3. History of aortic dissection  4. Essential hypertension  -Patient is hypertensive in the clinic today, she says she has not been checking her blood pressure closely at home  - I will increase her losartan to 100 mg daily  - Continue carvedilol as well as amlodipine dosages  - Patient follows up with vascular surgery as well as cardiothoracic surgery in September        5. Hyperlipidemia, unspecified hyperlipidemia type  -Patient states she is taking 30 mg of atorvastatin, I told her this is an uncommon dose but she could be taking this  - I am having her check her dosages at home to confirm      6. Acute pulmonary embolism, unspecified pulmonary embolism type, unspecified whether acute cor pulmonale present  -Patient had pulmonary embolism diagnosed on 6/7/2023.  It is possible to label this as provoked given that she had a fall with rib fractures and decreased activity that then led to pulmonary embolism  - Continue Eliquis 5 mg twice daily as well as Plavix as noted above  - If and when her Eliquis is stopped she should resume baby  aspirin        Other orders  -     losartan (COZAAR) 100 MG tablet; Take 1 tablet by mouth Daily.  Dispense: 90 tablet; Refill: 3  -     carvedilol (COREG) 25 MG tablet; Take 1 tablet by mouth 2 (Two) Times a Day With Meals.  Dispense: 180 tablet; Refill: 3  -     atorvastatin (LIPITOR) 20 MG tablet; Take 1.5 tablets by mouth Daily.  Dispense: 90 tablet; Refill: 3                   Follow Up   Return in about 6 months (around 1/27/2024).  Patient was given instructions and counseling regarding her condition or for health maintenance advice. Please see specific information pulled into the AVS if appropriate.       EMR Dragon/Transcription disclaimer: Much of this encounter note is an electronic transcription/translation of spoken language to printed text. The electronic translation of spoken language may permit erroneous, or at times, nonsensical words or phrases to be inadvertently transcribed; although I have reviewed the note for such errors, some may still exist.

## 2023-09-05 ENCOUNTER — TELEPHONE (OUTPATIENT)
Dept: VASCULAR SURGERY | Facility: CLINIC | Age: 58
End: 2023-09-05
Payer: MEDICAID

## 2023-09-05 NOTE — TELEPHONE ENCOUNTER
TRIED CALLING PT TO LET HIM KNOW TO KEEP HIS TESTING APPT ON  09/07/2023 AND THAT HIS APPT WITH DR RAMON HAS BEEN CHANGED DUE TO DR RAMON IS IN SURGERY ON THURSDAY NOW AND HIS APPT IS 09/19/2023 WITH DR RAMON. CANNOT LEAVE MESS

## 2023-09-11 ENCOUNTER — TELEPHONE (OUTPATIENT)
Dept: CARDIAC SURGERY | Facility: CLINIC | Age: 58
End: 2023-09-11
Payer: MEDICAID

## 2023-09-11 NOTE — TELEPHONE ENCOUNTER
Of note, financial clearing has not contacted our office regarding this. Once appointment is rescheduled, I will schedule CT to coordinate.

## 2023-09-11 NOTE — TELEPHONE ENCOUNTER
I was about to call pt to confirm appt tomorrow with [facility] for follow up with CT when I realized pt was sched for CT last week but it was cx'd by financial clearing.  Does pt still need appt tomorrow since he has not had a CT?/tamera

## 2023-09-11 NOTE — TELEPHONE ENCOUNTER
Yes, Marilu michele'd this on 9-6-23 and resched the office appt for tomorrow but I don't see where the CT was ever rescheduled.  Not sure the status on the auth for CT so not sure how to proceed with this/kahm

## 2023-09-11 NOTE — TELEPHONE ENCOUNTER
I would reschedule the patient's appointment to a day when Dr. Jefferson is available in the office.  Imaging will need to be rescheduled for that day.

## 2023-09-11 NOTE — TELEPHONE ENCOUNTER
She needs the repeat CT so Dr. Jefferson can review.  Her appointment with [facility] needs to be on a day when Dr. Jefferson is in the office to review her imaging.  It looks like appointment with [facility] was canceled last Thursday.

## 2023-09-21 ENCOUNTER — TELEPHONE (OUTPATIENT)
Dept: CARDIAC SURGERY | Facility: CLINIC | Age: 58
End: 2023-09-21

## 2023-09-21 NOTE — TELEPHONE ENCOUNTER
Caller: Shilpa Zhang    Relationship: Self    Best call back number: [phone]    What test/procedure requested: CT SCAN OF PELVIS WITH AND W/O CONTRAST    When is it needed: TUESDAY 9.26.23    Where is the test/procedure going to be performed: [facility]    Additional information or concerns: NEEDING US TO CALL [phone] OPT[facility]

## 2023-09-22 NOTE — TELEPHONE ENCOUNTER
This number is [facility]. We do not obtain authorizations. Please route to correct department.

## 2023-09-25 ENCOUNTER — TELEPHONE (OUTPATIENT)
Dept: CARDIAC SURGERY | Facility: CLINIC | Age: 58
End: 2023-09-25

## 2023-09-25 NOTE — TELEPHONE ENCOUNTER
[facility] called this morning re: CTA Chest A/P scheduled for pt on 9/26 along with OV with Jayde HARVEY. They did not approve the A/P and would need P2P for this. I spoke with Jayde LEE re: this and she states the appt needs to be moved anyway to a day when Dr Jefferson is not in the OR. I rescheduled this to 10/19 with Jayde HARVEY. The [facility] did not reschedule the CTA Chest and has left it on schedule for tomorrow. This would make the patient have to get two separate CT's - It would be advisable to wait until all CT's are approved and patient can have closer to appt date on 10/19. I tried to call patient re: this and there is no answer, no VM set up.

## 2023-09-26 ENCOUNTER — HOSPITAL ENCOUNTER (OUTPATIENT)
Dept: CT IMAGING | Facility: HOSPITAL | Age: 58
Discharge: HOME | End: 2023-09-26
Admitting: NURSE PRACTITIONER
Payer: MEDICAID

## 2023-09-26 DIAGNOSIS — I71.012 DISSECTION OF DESCENDING THORACIC AORTA: ICD-10-CM

## 2023-09-26 LAB — CREAT BLDA-MCNC: 0.6 MG/DL (ref 0.6–1.3)

## 2023-09-26 PROCEDURE — 71275 CT ANGIOGRAPHY CHEST: CPT

## 2023-09-26 PROCEDURE — 25510000001 IOPAMIDOL PER 1 ML: Performed by: NURSE PRACTITIONER

## 2023-09-26 PROCEDURE — 82565 ASSAY OF CREATININE: CPT

## 2023-09-26 RX ADMIN — IOPAMIDOL 100 ML: 755 INJECTION, SOLUTION INTRAVENOUS at 12:33

## 2023-09-26 NOTE — TELEPHONE ENCOUNTER
[facility] called me re: this patient as she did show up for CTA Chest today. I explained I had tried to contact patient to eliminate the need to have two separate CT scans as the Abd/Pelvis was denied by insurance and needs P2P. She did inform patient of this and of moved OV to 10/19 and she let patient know we would be calling her with a new date/time for CT Abd/Pel prior to that OV.     A P2P will need to be completed by either Jayde Syed or Dr Jefferson on this. Call [phone] option [phone] to schedule a date/time to complete this. Case # [id number]

## 2023-09-27 NOTE — TELEPHONE ENCOUNTER
Called patient and informed her of the above information and of OV date/time with Jayde Syed. She voiced understanding to all.

## 2023-09-27 NOTE — TELEPHONE ENCOUNTER
Dr. Jefferson and I have spoken about this.  Given CTA of the chest has already been performed, we will hold off on proceeding with CTA abdomen and pelvis at this time given patient does not need 2 contrast loads.  We will get CTA abdomen and pelvis at her next follow-up interval which will be determined when she sees Jayde Syed on 10/19.  It should be noted at that visit that CT abdomen and pelvis is needed as dissection extends to the iliac artery.

## 2023-10-19 ENCOUNTER — OFFICE VISIT (OUTPATIENT)
Dept: CARDIAC SURGERY | Facility: CLINIC | Age: 58
End: 2023-10-19
Payer: MEDICAID

## 2023-10-19 VITALS
SYSTOLIC BLOOD PRESSURE: 122 MMHG | OXYGEN SATURATION: 95 % | HEIGHT: 66 IN | WEIGHT: 202 LBS | HEART RATE: 73 BPM | DIASTOLIC BLOOD PRESSURE: 54 MMHG | BODY MASS INDEX: 32.47 KG/M2

## 2023-10-19 DIAGNOSIS — I71.012 DISSECTION OF DESCENDING THORACIC AORTA: Primary | ICD-10-CM

## 2023-10-19 DIAGNOSIS — E66.09 CLASS 1 OBESITY DUE TO EXCESS CALORIES WITH SERIOUS COMORBIDITY AND BODY MASS INDEX (BMI) OF 32.0 TO 32.9 IN ADULT: ICD-10-CM

## 2023-10-19 DIAGNOSIS — I71.21 ANEURYSM OF ASCENDING AORTA WITHOUT RUPTURE: ICD-10-CM

## 2023-10-19 NOTE — PROGRESS NOTES
Chief Complaint   Patient presents with    Dissection of Descending Thoracic Aorta     Pt is here for follow up w/ CT (performed 09/26/2023)     Subjective     Procedures Performed:   Endovascular Repair of Descending Thoracic Aorta, for Type B Aortic Dissection, with Coverage of LSA, Percutaneous Access and Closure of Femoral Artery for Delivery of Endograft through Large Sheath, Intravascular Ultrasound during Therapeutic Intervention, including radiographic supervision and interpretation; Selective Angiography of Celiac Artery, Percutaneous Arterioplasty with Stenting of Celiac Artery, Left Brachial Artery Open Exposure for Vascular Access, Laser Fenestration of LSA Origin into TEVAR graft with Stenting, Nonselective Angiography of Aortic Arch and Distal Descending Aorta by Dr. Jefferson and Dr. Farrar    History of Present Illness  Ms. Zhang is a 58-year-old female who presents today for a 6-month postop follow-up.  Overall the patient is feeling well today.  She still continues to some complaints of fatigue.  She denies chest or back pain.  She has a blood pressure log with her in office today with blood pressure readings ranging from 120-140/60-80.  In the interim she was seen by electrophysiology for SVT.  She is currently being treated with Coreg and flecainide.  She was not interested in an ablation at this time.  She also was found to have a PE in June, currently on Eliquis twice daily. She is a never smoker.     Review of Systems   Constitutional:  Positive for fatigue. Negative for chills and fever.   Respiratory:  Negative for cough and shortness of breath.    Cardiovascular:  Negative for chest pain and leg swelling.   Neurological:  Negative for dizziness, syncope and weakness.   Psychiatric/Behavioral:  Negative for agitation, behavioral problems and confusion.           Past Medical History:   Diagnosis Date    Aneurysm 01/312023    Anxiety 1988    Claustrophobia 1971    Depression 1988    Heart valve  disease 2023    Hyperlipidemia 2000    Multiple rib fractures 2023    Pulmonary embolism     Seizures 1974     Past Surgical History:   Procedure Laterality Date    ABDOMINAL AORTIC ANEURYSM REPAIR WITH ENDOGRAFT N/A 2023    Procedure: THORACIC AORTIC ANEURYSM REPAIR USING ENDOGRAFT;  Surgeon: Jonnathan Farrar DO;  Location:  [facility];  Service: Vascular;  Laterality: N/A;    ABDOMINAL SURGERY      AORTIC VALVE REPAIR/REPLACEMENT  2023    AORTIC VALVE SURGERY  2023    BRACHIAL ARTERY EXPLORATION N/A 2023    Procedure: BRACHIAL ARTERY EXPOSURE FOR LASER FENESTRATION OF THORACIC ENDOGRAFT FOR STENT PLACEMENT;  Surgeon: Jonnathan Farrar DO;  Location:  [facility];  Service: Vascular;  Laterality: N/A;     SECTION      X 2    CORONARY STENT PLACEMENT  2023    LUMBAR DRAIN INSERTION EXTERNAL N/A 2023    Procedure: LUMBAR DRAIN INSERTION EXTERNAL;  Surgeon: Elie Cerda MD;  Location:  [facility];  Service: Neurosurgery;  Laterality: N/A;    THORACIC AORTIC ANEURYSM REPAIR N/A 2023    Procedure: THORACIC AORTIC ANEURYSM REPAIR;  Surgeon: Riley Jefferson MD;  Location:  [facility];  Service: Vascular;  Laterality: N/A;     Family History   Problem Relation Age of Onset    Diabetes Mother     Diabetes Father     Hypertension Father     Hypertension Maternal Grandfather     Stroke Maternal Grandfather     Hypertension Paternal Grandfather     Hypertension Maternal Aunt      Social History     Tobacco Use    Smoking status: Never     Passive exposure: Past (Parents)    Smokeless tobacco: Never   Vaping Use    Vaping Use: Never used   Substance Use Topics    Alcohol use: Never    Drug use: Never     Current Outpatient Medications   Medication Sig Dispense Refill    amLODIPine (NORVASC) 5 MG tablet Take 1 tablet by mouth Daily. 30 tablet 2    atorvastatin (LIPITOR) 20 MG tablet Take 1.5 tablets by mouth Daily.  "(Patient taking differently: Take 1 tablet by mouth Daily.) 90 tablet 3    carvedilol (COREG) 25 MG tablet Take 1 tablet by mouth 2 (Two) Times a Day With Meals. 180 tablet 3    clopidogrel (PLAVIX) 75 MG tablet TAKE 1 TABLET BY MOUTH DAILY 30 tablet 2    Eliquis 5 MG tablet tablet Take 1 tablet by mouth Every 12 (Twelve) Hours.      flecainide (TAMBOCOR) 50 MG tablet Take 1 tablet by mouth 2 (Two) Times a Day. 60 tablet 11    FLUoxetine (PROzac) 60 MG tablet Take 1 tablet by mouth Daily.      furosemide (LASIX) 20 MG tablet Take 1 tablet by mouth As Needed.      hydrOXYzine (ATARAX) 10 MG tablet Take 1 tablet by mouth 3 (Three) Times a Day As Needed for Itching.      losartan (COZAAR) 100 MG tablet Take 1 tablet by mouth Daily. 90 tablet 3    phenytoin ER (DILANTIN) 100 MG capsule Take 5 capsules by mouth Every Night.       No current facility-administered medications for this visit.     Allergies:  Patient has no known allergies.    Objective      Vital Signs  Visit Vitals  /54 (BP Location: Right arm, Patient Position: Sitting, Cuff Size: Adult)   Pulse 73   Ht 167.6 cm (66\")   Wt 91.6 kg (202 lb)   SpO2 95%   BMI 32.60 kg/m²         Physical Exam  Constitutional:       General: She is not in acute distress.  HENT:      Head: Normocephalic and atraumatic.   Eyes:      Pupils: Pupils are equal, round, and reactive to light.   Cardiovascular:      Rate and Rhythm: Normal rate and regular rhythm.      Heart sounds: Murmur heard.      No gallop.   Pulmonary:      Effort: Pulmonary effort is normal. No respiratory distress.      Breath sounds: Normal breath sounds. No wheezing, rhonchi or rales.   Abdominal:      General: There is no distension.      Palpations: Abdomen is soft.      Tenderness: There is no abdominal tenderness.   Musculoskeletal:         General: Normal range of motion.      Right lower leg: No edema.      Left lower leg: No edema.   Skin:     General: Skin is warm and dry.   Neurological:      " General: No focal deficit present.      Mental Status: She is alert and oriented to person, place, and time.   Psychiatric:         Mood and Affect: Mood normal.         Behavior: Behavior normal.     Results Review:   CT Angiogram Chest    Result Date: 9/26/2023  Narrative: EXAM: CT ANGIOGRAM CHEST- 9/26/2023 11:48 AM CDT  HISTORY: Aortic aneurysm, known or suspected  COMPARISON: 3/6/2023  DOSE LENGTH PRODUCT: 850 mGy cm. Automated exposure control was also utilized to decrease patient radiation dose.  TECHNIQUE: Helical tomographic images of the chest were obtained after the administration of intravenous contrast following angiogram protocol. Additionally, 3D and multiplanar reformatted images as well as MIPS were provided.    FINDINGS:  Support Devices: None.  Central Airways: Patent.  Lungs/Pleura: Mild atelectasis in the lung bases. No consolidation, pleural effusion, or pneumothorax.  Nodules: No suspicious pulmonary nodules.  Lower Neck: Unremarkable.  Mediastinum/Hilum: No enlarged lymphadenopathy.  Heart: Cardiomegaly. No pericardial effusion. Minimal coronary artery calcifications.  Aorta:  Status post aortic arch and descending thoracic aorta stent graft for a type B aortic dissection. Stent graft is patent.  Additional stent extends into the proximal left subclavian artery and celiac artery. The stent grafts are patent.  The great vessels are patent.  Resolved hematoma external to the aortic graft.  Unchanged dilation of the ascending thoracic aorta measuring up to 4.8 cm. Aortic arch measures up to 3.1 cm. Descending thoracic aorta measures up to 4.4 cm just proximal to the level of the diaphragmatic hiatus, previously 4.8 cm.  No definite endoleak visualized. Upper abdominal aortic dissection is partially visualized with the celiac, SMA, and right renal artery arising from the true lumen. The left renal artery also appears to arise from the true lumen but courses through the false lumen.  Pulmonary  Arteries: No pulmonary embolism. Enlarged main pulmonary artery measuring up to 4.4 cm.  Chest wall/Soft Tissues: Mild subcutaneous fat soft tissue stranding in the upper right chest with mild skin thickening.  Upper Abdomen: No acute or suspicious findings.  Osseous Structures: No acute or suspicious osseous finding.      Impression:  Status post aortic endograft repair of a type B aortic dissection with the stent extending from the aortic arch to the diaphragmatic hiatus. Additional stents within the proximal left subclavian artery and celiac artery. Stent grafts are patent. Resolved hematoma external to the aortic graft. No evidence of a thoracic endoleak.  Partially visualized upper abdominal aortic dissection with the celiac, SMA, right renal, and left renal arteries arising from the true lumens. There is persistent contrast opacification of the false lumen visualized in the upper abdomen.  Descending thoracic aorta aneurysm measures 4.4 cm, previously 4.8 cm.  Unchanged dilated ascending thoracic aorta (4.8 cm).  Enlarged main pulmonary artery (4.4 cm) which can be seen with pulmonary artery hypertension.  Mild subcutaneous fat soft tissue stranding in the upper right chest with mild overlying skin thickening. This could be posttraumatic or related to cellulitis. No underlying fluid collection.    This report was signed and finalized on 9/26/2023 1:00 PM CDT by Riley Aguilar.       Images reviewed with Dr. Jefferson. Distal endo leak much improved to resolved. Stable ascending aorta measuring 4.7 cm in maximum dimension.       Assessment & Plan     Dissection of descending thoracic aorta  Aneurysm of ascending aorta without rupture  Class I obesity due to excess calories with serious comorbidity and body mass index of 32.0-32.9 in adult    Ms. Zhang is a 58-year-old female who presents today for a 6-month postop follow-up.  Overall the patient is feeling well today.  She still continues to some complaints of  fatigue.  She denies chest or back pain.  She has a blood pressure log with her in office today with blood pressure readings ranging from 120-140/60-80. CT angiogram reviewed with Dr. Jefferson. The distal endo leak is much improved to resolved. The ascending aortic aneurysm is stable in size from the previous study.     We discussed the natural course history of aortic aneurysmal disease. We discussed the specific size of aneurysm today and potential risk of aortic complications. We discussed the operative treatment of aneursymal disease broadly. At this time we discussed the recommendation to plan surveillance with CT scans. We discussed signs and symptoms of acute aortic pathology and the need to present to the emergency department for further evaluation. Lastly, we discussed the value of exercise while being mindful of a known aneurysm and the potential risk that high intensity, isometric, or valsalva type exercises presents.   Potential medical therapy including the use of a beta-blocker and perhaps other agents to accomplish strict control of pressure were discussed.     Keep schedule appointments with cardiology    Follow-up with Dr. Jefferson in 6 months with CT angiogram of the chest and abd/pelvis     Body mass index is 32.6 kg/m². (Class 1 obesity). Educational material. PCP referral.     Shilpa Zhang  reports that she has never smoked. She has been exposed to tobacco smoke. She has never used smokeless tobacco.     Advance Care Planning   NA. Patient is under 65 years of age    ARTI Soria

## 2023-10-20 PROBLEM — I71.21 ANEURYSM OF ASCENDING AORTA WITHOUT RUPTURE: Status: ACTIVE | Noted: 2023-10-20

## 2023-10-20 PROBLEM — E66.09 CLASS 1 OBESITY DUE TO EXCESS CALORIES WITH SERIOUS COMORBIDITY AND BODY MASS INDEX (BMI) OF 32.0 TO 32.9 IN ADULT: Status: ACTIVE | Noted: 2023-10-20

## 2023-10-20 PROBLEM — E66.811 CLASS 1 OBESITY DUE TO EXCESS CALORIES WITH SERIOUS COMORBIDITY AND BODY MASS INDEX (BMI) OF 32.0 TO 32.9 IN ADULT: Status: ACTIVE | Noted: 2023-10-20

## 2023-10-23 ENCOUNTER — TELEPHONE (OUTPATIENT)
Dept: VASCULAR SURGERY | Facility: CLINIC | Age: 58
End: 2023-10-23
Payer: MEDICAID

## 2023-10-24 ENCOUNTER — OFFICE VISIT (OUTPATIENT)
Dept: VASCULAR SURGERY | Facility: CLINIC | Age: 58
End: 2023-10-24
Payer: MEDICAID

## 2023-10-24 VITALS
BODY MASS INDEX: 31.66 KG/M2 | DIASTOLIC BLOOD PRESSURE: 58 MMHG | SYSTOLIC BLOOD PRESSURE: 112 MMHG | OXYGEN SATURATION: 97 % | HEIGHT: 66 IN | HEART RATE: 62 BPM | WEIGHT: 197 LBS

## 2023-10-24 DIAGNOSIS — I71.012 DISSECTION OF DESCENDING THORACIC AORTA: Primary | ICD-10-CM

## 2023-10-24 DIAGNOSIS — I10 ESSENTIAL HYPERTENSION: ICD-10-CM

## 2023-10-24 NOTE — PROGRESS NOTES
"10/24/2023      Gill Ordoñez MD  [address] [place] [address] [address] [place] [id number]        Shilpa Zhang  1965      Chief Complaint   Patient presents with    Follow-up     6 month follow up. Last seen 3/14/23. Patient denies any abdominal, back or chest pain.        Dear Gill Ordoñez MD:   I had the pleasure of seeing you patient in the office today for follow up.  As you recall, the patient is a 58 y.o. female who we are currently following for routine health maintenance.  She did undergo TEVAR on 2/2/23. She reports she is feeling well other than fatigue. She is maintained on Eliquis and Plavix.  Her incisions healed without difficulty. She did have noninvasive testing performed which I did personally review.      Review of Systems   Constitutional:  Positive for fatigue.   HENT: Negative.     Eyes: Negative.    Respiratory: Negative.     Cardiovascular: Negative.    Gastrointestinal: Negative.    Endocrine: Negative.    Genitourinary: Negative.    Musculoskeletal: Negative.    Skin: Negative.    Allergic/Immunologic: Negative.    Neurological:  Positive for weakness.   Hematological: Negative.    Psychiatric/Behavioral: Negative.     All other systems reviewed and are negative.       /58   Pulse 62   Ht 167.6 cm (66\")   Wt 89.4 kg (197 lb)   SpO2 97%   BMI 31.80 kg/m²    Physical Exam  Vitals and nursing note reviewed.   Constitutional:       Appearance: She is well-developed.   HENT:      Head: Normocephalic and atraumatic.   Eyes:      General: No scleral icterus.     Pupils: Pupils are equal, round, and reactive to light.   Neck:      Thyroid: No thyromegaly.      Vascular: No carotid bruit or JVD.   Cardiovascular:      Rate and Rhythm: Normal rate and regular rhythm.      Pulses:           Carotid pulses are 2+ on the right side and 2+ on the left side.       Radial pulses are 2+ on the right side and 2+ on the left side.        Femoral pulses are 2+ on the right side and 2+ on the " left side.       Popliteal pulses are 2+ on the right side and 2+ on the left side.        Dorsalis pedis pulses are 2+ on the right side and 2+ on the left side.        Posterior tibial pulses are 2+ on the right side and 2+ on the left side.      Heart sounds: Normal heart sounds.   Pulmonary:      Effort: Pulmonary effort is normal.      Breath sounds: Normal breath sounds.   Abdominal:      General: Bowel sounds are normal. There is no distension or abdominal bruit.      Palpations: Abdomen is soft. There is no mass.      Tenderness: There is no abdominal tenderness.   Musculoskeletal:         General: Normal range of motion.      Cervical back: Neck supple.   Lymphadenopathy:      Cervical: No cervical adenopathy.   Skin:     General: Skin is warm and dry.   Neurological:      Mental Status: She is alert and oriented to person, place, and time.      Cranial Nerves: No cranial nerve deficit.      Sensory: No sensory deficit.           DIAGNOSTIC DATA:  CT Angiogram Chest    Result Date: 9/26/2023  Narrative: EXAM: CT ANGIOGRAM CHEST- 9/26/2023 11:48 AM CDT  HISTORY: Aortic aneurysm, known or suspected  COMPARISON: 3/6/2023  DOSE LENGTH PRODUCT: 850 mGy cm. Automated exposure control was also utilized to decrease patient radiation dose.  TECHNIQUE: Helical tomographic images of the chest were obtained after the administration of intravenous contrast following angiogram protocol. Additionally, 3D and multiplanar reformatted images as well as MIPS were provided.    FINDINGS:  Support Devices: None.  Central Airways: Patent.  Lungs/Pleura: Mild atelectasis in the lung bases. No consolidation, pleural effusion, or pneumothorax.  Nodules: No suspicious pulmonary nodules.  Lower Neck: Unremarkable.  Mediastinum/Hilum: No enlarged lymphadenopathy.  Heart: Cardiomegaly. No pericardial effusion. Minimal coronary artery calcifications.  Aorta:  Status post aortic arch and descending thoracic aorta stent graft for a type B  aortic dissection. Stent graft is patent.  Additional stent extends into the proximal left subclavian artery and celiac artery. The stent grafts are patent.  The great vessels are patent.  Resolved hematoma external to the aortic graft.  Unchanged dilation of the ascending thoracic aorta measuring up to 4.8 cm. Aortic arch measures up to 3.1 cm. Descending thoracic aorta measures up to 4.4 cm just proximal to the level of the diaphragmatic hiatus, previously 4.8 cm.  No definite endoleak visualized. Upper abdominal aortic dissection is partially visualized with the celiac, SMA, and right renal artery arising from the true lumen. The left renal artery also appears to arise from the true lumen but courses through the false lumen.  Pulmonary Arteries: No pulmonary embolism. Enlarged main pulmonary artery measuring up to 4.4 cm.  Chest wall/Soft Tissues: Mild subcutaneous fat soft tissue stranding in the upper right chest with mild skin thickening.  Upper Abdomen: No acute or suspicious findings.  Osseous Structures: No acute or suspicious osseous finding.      Impression:  Status post aortic endograft repair of a type B aortic dissection with the stent extending from the aortic arch to the diaphragmatic hiatus. Additional stents within the proximal left subclavian artery and celiac artery. Stent grafts are patent. Resolved hematoma external to the aortic graft. No evidence of a thoracic endoleak.  Partially visualized upper abdominal aortic dissection with the celiac, SMA, right renal, and left renal arteries arising from the true lumens. There is persistent contrast opacification of the false lumen visualized in the upper abdomen.  Descending thoracic aorta aneurysm measures 4.4 cm, previously 4.8 cm.  Unchanged dilated ascending thoracic aorta (4.8 cm).  Enlarged main pulmonary artery (4.4 cm) which can be seen with pulmonary artery hypertension.  Mild subcutaneous fat soft tissue stranding in the upper right chest  with mild overlying skin thickening. This could be posttraumatic or related to cellulitis. No underlying fluid collection.    This report was signed and finalized on 9/26/2023 1:00 PM CDT by Riley Aguilar.           Patient Active Problem List   Diagnosis    Dissection of descending thoracic aorta    Nausea & vomiting    Aortic dissection    Essential hypertension    SVT (supraventricular tachycardia)    Aneurysm of ascending aorta without rupture    Class 1 obesity due to excess calories with serious comorbidity and body mass index (BMI) of 32.0 to 32.9 in adult       ICD-10-CM ICD-9-CM   1. Dissection of descending thoracic aorta  I71.012 441.01   2. Essential hypertension  I10 401.9         PLAN: After thoroughly evaluating Shilpa Zhang, I believe the best course of action is to remain conservative from a vascular surgery standpoint.  Currently, she appears to be doing quite well without any significant complaints.  Her blood pressure is well controlled.  I did review her CTA which does show her previous distal type Ib endoleak to be resolved.  Her left subclavian stent and celiac artery stent appear to be widely patent.  We will see her back in 6 months time with repeat testing for continued surveillance. I did discuss vascular risk factors as they pertain to the progression of vascular disease including controlling essential hypertension.  This risk factor is currently controlled and stable.  The patient is to continue taking their medications as previously discussed.   This was all discussed in full with complete understanding.  Thank you for allowing me to participate in the care of your patient.  Please do not hesitate to call with any questions or concerns.  We will keep you aware of any further encounters with Shilpa Zhang.      Sincerely Yours,        Jonnathan Farrar, DO

## 2023-10-24 NOTE — LETTER
"October 24, 2023     Gill Ordoñez MD  [address] [place] [address]  [address]  [facility] [id number]    Patient: Shilpa Zhang   YOB: 1965   Date of Visit: 10/24/2023     Dear Gill Ordoñez MD:       Thank you for referring Shilpa Zhang to me for evaluation. Below are the relevant portions of my assessment and plan of care.    If you have questions, please do not hesitate to call me. I look forward to following Shilpa along with you.         Sincerely,        Jonnathan Farrar DO        CC: No Recipients    Jonnathan Farrar DO  10/24/23 1618  Sign when Signing Visit  10/24/2023      Gill Ordoñez MD  [address] [place] [address] [address]  [facility] [address]        Shilpa Zhang  1965      Chief Complaint   Patient presents with   • Follow-up     6 month follow up. Last seen 3/14/23. Patient denies any abdominal, back or chest pain.        Dear Gill Ordoñez MD:   I had the pleasure of seeing you patient in the office today for follow up.  As you recall, the patient is a 58 y.o. female who we are currently following for routine health maintenance.  She did undergo TEVAR on 2/2/23. She reports she is feeling well other than fatigue. She is maintained on Eliquis and Plavix.  Her incisions healed without difficulty. She did have noninvasive testing performed which I did personally review.      Review of Systems   Constitutional:  Positive for fatigue.   HENT: Negative.     Eyes: Negative.    Respiratory: Negative.     Cardiovascular: Negative.    Gastrointestinal: Negative.    Endocrine: Negative.    Genitourinary: Negative.    Musculoskeletal: Negative.    Skin: Negative.    Allergic/Immunologic: Negative.    Neurological:  Positive for weakness.   Hematological: Negative.    Psychiatric/Behavioral: Negative.     All other systems reviewed and are negative.       /58   Pulse 62   Ht 167.6 cm (66\")   Wt 89.4 kg (197 lb)   SpO2 97%   BMI 31.80 kg/m²    Physical Exam  Vitals and nursing " note reviewed.   Constitutional:       Appearance: She is well-developed.   HENT:      Head: Normocephalic and atraumatic.   Eyes:      General: No scleral icterus.     Pupils: Pupils are equal, round, and reactive to light.   Neck:      Thyroid: No thyromegaly.      Vascular: No carotid bruit or JVD.   Cardiovascular:      Rate and Rhythm: Normal rate and regular rhythm.      Pulses:           Carotid pulses are 2+ on the right side and 2+ on the left side.       Radial pulses are 2+ on the right side and 2+ on the left side.        Femoral pulses are 2+ on the right side and 2+ on the left side.       Popliteal pulses are 2+ on the right side and 2+ on the left side.        Dorsalis pedis pulses are 2+ on the right side and 2+ on the left side.        Posterior tibial pulses are 2+ on the right side and 2+ on the left side.      Heart sounds: Normal heart sounds.   Pulmonary:      Effort: Pulmonary effort is normal.      Breath sounds: Normal breath sounds.   Abdominal:      General: Bowel sounds are normal. There is no distension or abdominal bruit.      Palpations: Abdomen is soft. There is no mass.      Tenderness: There is no abdominal tenderness.   Musculoskeletal:         General: Normal range of motion.      Cervical back: Neck supple.   Lymphadenopathy:      Cervical: No cervical adenopathy.   Skin:     General: Skin is warm and dry.   Neurological:      Mental Status: She is alert and oriented to person, place, and time.      Cranial Nerves: No cranial nerve deficit.      Sensory: No sensory deficit.           DIAGNOSTIC DATA:  CT Angiogram Chest    Result Date: 9/26/2023  Narrative: EXAM: CT ANGIOGRAM CHEST- 9/26/2023 11:48 AM CDT  HISTORY: Aortic aneurysm, known or suspected  COMPARISON: 3/6/2023  DOSE LENGTH PRODUCT: 850 mGy cm. Automated exposure control was also utilized to decrease patient radiation dose.  TECHNIQUE: Helical tomographic images of the chest were obtained after the administration of  intravenous contrast following angiogram protocol. Additionally, 3D and multiplanar reformatted images as well as MIPS were provided.    FINDINGS:  Support Devices: None.  Central Airways: Patent.  Lungs/Pleura: Mild atelectasis in the lung bases. No consolidation, pleural effusion, or pneumothorax.  Nodules: No suspicious pulmonary nodules.  Lower Neck: Unremarkable.  Mediastinum/Hilum: No enlarged lymphadenopathy.  Heart: Cardiomegaly. No pericardial effusion. Minimal coronary artery calcifications.  Aorta:  Status post aortic arch and descending thoracic aorta stent graft for a type B aortic dissection. Stent graft is patent.  Additional stent extends into the proximal left subclavian artery and celiac artery. The stent grafts are patent.  The great vessels are patent.  Resolved hematoma external to the aortic graft.  Unchanged dilation of the ascending thoracic aorta measuring up to 4.8 cm. Aortic arch measures up to 3.1 cm. Descending thoracic aorta measures up to 4.4 cm just proximal to the level of the diaphragmatic hiatus, previously 4.8 cm.  No definite endoleak visualized. Upper abdominal aortic dissection is partially visualized with the celiac, SMA, and right renal artery arising from the true lumen. The left renal artery also appears to arise from the true lumen but courses through the false lumen.  Pulmonary Arteries: No pulmonary embolism. Enlarged main pulmonary artery measuring up to 4.4 cm.  Chest wall/Soft Tissues: Mild subcutaneous fat soft tissue stranding in the upper right chest with mild skin thickening.  Upper Abdomen: No acute or suspicious findings.  Osseous Structures: No acute or suspicious osseous finding.      Impression:  Status post aortic endograft repair of a type B aortic dissection with the stent extending from the aortic arch to the diaphragmatic hiatus. Additional stents within the proximal left subclavian artery and celiac artery. Stent grafts are patent. Resolved hematoma  external to the aortic graft. No evidence of a thoracic endoleak.  Partially visualized upper abdominal aortic dissection with the celiac, SMA, right renal, and left renal arteries arising from the true lumens. There is persistent contrast opacification of the false lumen visualized in the upper abdomen.  Descending thoracic aorta aneurysm measures 4.4 cm, previously 4.8 cm.  Unchanged dilated ascending thoracic aorta (4.8 cm).  Enlarged main pulmonary artery (4.4 cm) which can be seen with pulmonary artery hypertension.  Mild subcutaneous fat soft tissue stranding in the upper right chest with mild overlying skin thickening. This could be posttraumatic or related to cellulitis. No underlying fluid collection.    This report was signed and finalized on 9/26/2023 1:00 PM CDT by Riley Aguilar.           Patient Active Problem List   Diagnosis   • Dissection of descending thoracic aorta   • Nausea & vomiting   • Aortic dissection   • Essential hypertension   • SVT (supraventricular tachycardia)   • Aneurysm of ascending aorta without rupture   • Class 1 obesity due to excess calories with serious comorbidity and body mass index (BMI) of 32.0 to 32.9 in adult       ICD-10-CM ICD-9-CM   1. Dissection of descending thoracic aorta  I71.012 441.01   2. Essential hypertension  I10 401.9         PLAN: After thoroughly evaluating Shilpa Zhang, I believe the best course of action is to remain conservative from a vascular surgery standpoint.  Currently, she appears to be doing quite well without any significant complaints.  Her blood pressure is well controlled.  I did review her CTA which does show her previous distal type Ib endoleak to be resolved.  Her left subclavian stent and celiac artery stent appear to be widely patent.  We will see her back in 6 months time with repeat testing for continued surveillance. I did discuss vascular risk factors as they pertain to the progression of vascular disease including controlling  essential hypertension.  This risk factor is currently controlled and stable.  The patient is to continue taking their medications as previously discussed.   This was all discussed in full with complete understanding.  Thank you for allowing me to participate in the care of your patient.  Please do not hesitate to call with any questions or concerns.  We will keep you aware of any further encounters with Shilpa Zhang.      Sincerely Yours,        Jonnathan Farrar, DO

## 2023-11-30 ENCOUNTER — OFFICE VISIT (OUTPATIENT)
Dept: CARDIOLOGY | Facility: CLINIC | Age: 58
End: 2023-11-30
Payer: MEDICAID

## 2023-11-30 VITALS
WEIGHT: 198 LBS | SYSTOLIC BLOOD PRESSURE: 126 MMHG | BODY MASS INDEX: 31.82 KG/M2 | OXYGEN SATURATION: 98 % | HEIGHT: 66 IN | DIASTOLIC BLOOD PRESSURE: 74 MMHG | HEART RATE: 75 BPM

## 2023-11-30 DIAGNOSIS — I47.10 PAROXYSMAL SVT (SUPRAVENTRICULAR TACHYCARDIA): Primary | ICD-10-CM

## 2023-11-30 DIAGNOSIS — I26.99 PULMONARY EMBOLISM, UNSPECIFIED CHRONICITY, UNSPECIFIED PULMONARY EMBOLISM TYPE, UNSPECIFIED WHETHER ACUTE COR PULMONALE PRESENT: ICD-10-CM

## 2023-11-30 DIAGNOSIS — I71.00 DISSECTION OF AORTA, UNSPECIFIED PORTION OF AORTA: ICD-10-CM

## 2023-11-30 DIAGNOSIS — I47.10 SVT (SUPRAVENTRICULAR TACHYCARDIA): ICD-10-CM

## 2023-11-30 RX ORDER — APIXABAN 5 MG/1
5 TABLET, FILM COATED ORAL EVERY 12 HOURS
Qty: 60 TABLET | Refills: 6 | Status: SHIPPED | OUTPATIENT
Start: 2023-11-30

## 2023-11-30 NOTE — TELEPHONE ENCOUNTER
Refill request came to me today.  It looks like patient saw you in office today and Plavix was discontinued.  Just forwarding to you for clarification.

## 2023-11-30 NOTE — PROGRESS NOTES
"[facility] -  CLINIC FOLLOW UP     Patient Care Team:  Gill Ordoñez MD as PCP - General (Family Medicine)  Jefferson, Riley TREVIZO MD as Consulting Physician (Cardiothoracic Surgery)    Chief Complaint: follow up on SVT     Subjective   EP Problems:  1.  Sustained SVT       Cardiology Problems:  1.  Type B aortic dissection s/p TEVAR  2.  Ascending aortic aneurysm  3.  HTN     Medical Problems:  1.  Seizure disorder  2. PE 6/2023  -provoked after fall  3. Hx of thrombus  -?DVT on BCPs    HPI: Today I had the pleasure of seeing Shilpa Zhang in the cardiology clinic for follow up.  She is a pleasant 57 year old female with a history of sustained SVT initially thought to be atrial fibrillation after her endovascular repair of type B aortic dissection. A follow up Holter monitor did not show evidence of afib but rather clear sustained SVT. Dr. Lancaster initiated Flecainide, discontinued eliquis at that time and referred her to EP. She has been hesitant to pursue ablation overall and previously canceled her procedure due to illness. She feels the medication helps the arrhythmia.     She was diagnosed with PE in June after a fall with rib fractured. She has been maintained on Eliquis. She hasn't been able to get it for 2 weeks. She reports that she had a previous \"clot\" before in her 20s when she was on birth control pills. It may have been superficial as she was only treated with aspirin and does not remember being on warfarin or lovenox at that time.       Objective     Visit Vitals  /74   Pulse 75   Ht 167.6 cm (65.98\")   Wt 89.8 kg (198 lb)   SpO2 98%   BMI 31.97 kg/m²           Vitals reviewed.   Constitutional:       Appearance: Not in distress. Obese.   Eyes:      Extraocular Movements: Extraocular movements intact.      Conjunctiva/sclera: Conjunctivae normal.      Pupils: Pupils are equal, round, and reactive to light.   HENT:      Head: Normocephalic and atraumatic.      Nose: Nose " normal.    Mouth/Throat:      Lips: Pink.      Mouth: Mucous membranes are moist.      Pharynx: Oropharynx is clear.   Neck:      Vascular: No carotid bruit or JVD. JVD normal.   Pulmonary:      Effort: Pulmonary effort is normal.      Breath sounds: Normal breath sounds.   Chest:      Chest wall: Not tender to palpatation.   Cardiovascular:      PMI at left midclavicular line. Normal rate. Regular rhythm. Normal S1. Normal S2.       Murmurs: There is a grade 1/6 systolic murmur.      No gallop.  No rub.      Comments: Varicosities in lower extremities  Pulses:     Radial: 2+ bilaterally.  Edema:     Peripheral edema absent.   Abdominal:      General: Bowel sounds are normal.      Palpations: Abdomen is soft.   Musculoskeletal: Normal range of motion.      Extremities: No clubbing present.     Cervical back: Normal range of motion. Skin:     General: Skin is warm and dry.   Neurological:      General: No focal deficit present.      Mental Status: Alert and oriented to person, place, and time.   Psychiatric:         Attention and Perception: Attention normal.         Mood and Affect: Affect normal.         Speech: Speech normal.         Behavior: Behavior normal.         Cognition and Memory: Cognition normal.             The following portions of the patient's history were reviewed and updated as appropriate: allergies, current medications, past medical history, past social history, past and problem list.     Review of Systems   Constitutional: Negative.    HENT: Negative.     Eyes: Negative.    Respiratory: Negative.     Cardiovascular: Negative.    Gastrointestinal: Negative.    Endocrine: Negative.    Genitourinary: Negative.    Musculoskeletal: Negative.    Skin: Negative.    Allergic/Immunologic: Negative.    Neurological: Negative.    Hematological: Negative.    Psychiatric/Behavioral: Negative.              ECG 12 Lead    Date/Time: 11/30/2023 10:53 AM  Performed by: Rebeca Justice PA    Authorized by:  "Rebeca Justice PA  Comparison: compared with previous ECG from 5/30/2023  Rhythm: sinus rhythm  Rate: normal  Conduction: incomplete right bundle branch block  Other findings: non-specific ST-T wave changes    Clinical impression: abnormal EKG  Comments: QRS duration of 102ms             Medication Review: yes    Current Outpatient Medications:     amLODIPine (NORVASC) 5 MG tablet, Take 1 tablet by mouth Daily., Disp: 30 tablet, Rfl: 2    atorvastatin (LIPITOR) 20 MG tablet, Take 1.5 tablets by mouth Daily. (Patient taking differently: Take 1 tablet by mouth Daily.), Disp: 90 tablet, Rfl: 3    carvedilol (COREG) 25 MG tablet, Take 1 tablet by mouth 2 (Two) Times a Day With Meals., Disp: 180 tablet, Rfl: 3    Eliquis 5 MG tablet tablet, Take 1 tablet by mouth Every 12 (Twelve) Hours., Disp: 60 tablet, Rfl: 6    flecainide (TAMBOCOR) 50 MG tablet, Take 1 tablet by mouth 2 (Two) Times a Day., Disp: 60 tablet, Rfl: 11    FLUoxetine (PROzac) 60 MG tablet, Take 1 tablet by mouth Daily., Disp: , Rfl:     furosemide (LASIX) 20 MG tablet, Take 1 tablet by mouth As Needed., Disp: , Rfl:     hydrOXYzine (ATARAX) 10 MG tablet, Take 1 tablet by mouth 3 (Three) Times a Day As Needed for Itching., Disp: , Rfl:     losartan (COZAAR) 100 MG tablet, Take 1 tablet by mouth Daily., Disp: 90 tablet, Rfl: 3    phenytoin ER (DILANTIN) 100 MG capsule, Take 5 capsules by mouth Every Night., Disp: , Rfl:    No Known Allergies    I have reviewed       CBC:  Lab Results - Last 18 Months   Lab Units 02/07/23  0443   WBC 10*3/mm3 10.85*   HEMOGLOBIN g/dL 11.9*   HEMATOCRIT % 38.3   PLATELETS 10*3/mm3 262      BMP/CMP:  Lab Results - Last 18 Months   Lab Units 09/26/23  1140 03/09/23  1512   SODIUM mmol/L  --  142   POTASSIUM mmol/L  --  4.3   CHLORIDE mmol/L  --  104   CO2 mmol/L  --  24.0   GLUCOSE mg/dL  --  86   BUN mg/dL  --  18   CREATININE mg/dL 0.60 0.42*   CALCIUM mg/dL  --  9.0     BNP: No results for input(s): \"PROBNP\" in the last " 27511 hours.   THYROID:  Lab Results - Last 18 Months   Lab Units 03/09/23  1512   TSH uIU/mL 2.850       Results for orders placed during the hospital encounter of 01/31/23    Adult Transthoracic Echo Complete W/ Cont if Necessary Per Protocol    Interpretation Summary    Left ventricular systolic function is normal. Left ventricular ejection fraction appears to be 56 - 60%.    Left ventricular wall thickness is consistent with mild concentric hypertrophy.    Normal right ventricular cavity size and systolic function noted.    Mild aortic valve regurgitation is present.    Moderate dilation of the aortic root is present. Mild dilation of the sinuses of Valsalva is present. Mild dilation of the descending aorta present.     Assessment:   Diagnoses and all orders for this visit:    1. Paroxysmal SVT (supraventricular tachycardia) (Primary)  -     ECG 12 Lead; Future    2. Pulmonary embolism, unspecified chronicity, unspecified pulmonary embolism type, unspecified whether acute cor pulmonale present  -     Eliquis 5 MG tablet tablet; Take 1 tablet by mouth Every 12 (Twelve) Hours.  Dispense: 60 tablet; Refill: 6    3. SVT (supraventricular tachycardia)    4. Dissection of aorta, unspecified portion of aorta    Other orders  -     ECG 12 Lead      SVT: Still hesitant to perform Ablation for SVT. Would like to continue flecainide and follow up in 6 months with Dr. Love.     PE: Nearing 6 months of therapy. Provided refills today. Will discuss with PCP about need for lifelong anticoagulation given what sounds like previous thrombus in her lower extremity on BCPs in past.     Aortic dissection: Follow up by CT surgery. Will be having repeat CT in 6 months. Currently stable ascending aortic aneurysm at 4.7cm.   -Continue coreg, amlodipine and losartan.       I spent 30 minutes caring for Shilpa on this date of service. This time includes time spent by me in the following activities:preparing for the visit, reviewing  tests, obtaining and/or reviewing a separately obtained history, performing a medically appropriate examination and/or evaluation , counseling and educating the patient/family/caregiver, ordering medications, tests, or procedures, referring and communicating with other health care professionals , and documenting information in the medical record        Electronically signed by FREDDIE Montana

## 2023-12-01 NOTE — TELEPHONE ENCOUNTER
Dr. Jefferson said to check with Dr. Farrar due to her subclavian stent.   We will defer to him on how long he wants her to take Plavix

## 2023-12-20 RX ORDER — CLOPIDOGREL BISULFATE 75 MG/1
75 TABLET ORAL DAILY
Qty: 30 TABLET | Refills: 2 | Status: SHIPPED | OUTPATIENT
Start: 2023-12-20

## 2024-01-30 ENCOUNTER — OFFICE VISIT (OUTPATIENT)
Dept: CARDIOLOGY | Facility: CLINIC | Age: 59
End: 2024-01-30
Payer: MEDICAID

## 2024-01-30 VITALS
DIASTOLIC BLOOD PRESSURE: 60 MMHG | BODY MASS INDEX: 33.99 KG/M2 | SYSTOLIC BLOOD PRESSURE: 130 MMHG | WEIGHT: 204 LBS | OXYGEN SATURATION: 95 % | HEIGHT: 65 IN | HEART RATE: 70 BPM

## 2024-01-30 DIAGNOSIS — I26.99 PULMONARY EMBOLISM, UNSPECIFIED CHRONICITY, UNSPECIFIED PULMONARY EMBOLISM TYPE, UNSPECIFIED WHETHER ACUTE COR PULMONALE PRESENT: ICD-10-CM

## 2024-01-30 DIAGNOSIS — E78.5 HYPERLIPIDEMIA, UNSPECIFIED HYPERLIPIDEMIA TYPE: ICD-10-CM

## 2024-01-30 DIAGNOSIS — I10 ESSENTIAL HYPERTENSION: ICD-10-CM

## 2024-01-30 DIAGNOSIS — Z86.79 HISTORY OF AORTIC DISSECTION: ICD-10-CM

## 2024-01-30 DIAGNOSIS — I47.10 PAROXYSMAL SVT (SUPRAVENTRICULAR TACHYCARDIA): Primary | ICD-10-CM

## 2024-01-30 DIAGNOSIS — I71.21 ANEURYSM OF ASCENDING AORTA WITHOUT RUPTURE: ICD-10-CM

## 2024-01-30 RX ORDER — AMLODIPINE BESYLATE 10 MG/1
10 TABLET ORAL
Qty: 90 TABLET | Refills: 3 | Status: SHIPPED | OUTPATIENT
Start: 2024-01-30 | End: 2025-01-29

## 2024-01-30 RX ORDER — ATORVASTATIN CALCIUM 20 MG/1
40 TABLET, FILM COATED ORAL DAILY
Start: 2024-01-30

## 2024-01-30 NOTE — PROGRESS NOTES
Reason For Visit:  SVT (supraventricular tachycardia) (3 mo f/u)     Subjective        Shilpa Zhang is a 58 y.o. female with the below pertinent PMH who presents for follow-up of cardiovascular disease.    Patient was last seen in the cardiology clinic 7/27/2023.  At that time she was doing reasonably well without any significant cardiac symptoms.  Patient was hypertensive in the clinic.  Her losartan was increased to 100 mg daily.    Since her previous appointment, from a cardiovascular standpoint, she is been doing well.  She denies any concerns at this time.  She has had some falls recently.  Appear to be mechanical in nature, she says that she gets her feet tied up.  Denies any dizziness or lightheadedness.  She brings a blood pressure log which shows a average systolic of 130s to 140s.  She is tolerating her medication regimen well without significant side effects.          ROS: Pertinent findings as noted above    Pertinent PMH  -Type B aortic dissection status post TEVAR January 2023  -Ascending aortic aneurysm  - SVT  - Hypertension  - Hyperlipidemia    Pertinent past medical, surgical, family, and social history were reviewed.      Current Outpatient Medications:     amLODIPine (NORVASC) 5 MG tablet, Take 1 tablet by mouth Daily., Disp: 30 tablet, Rfl: 2    atorvastatin (LIPITOR) 20 MG tablet, Take 1.5 tablets by mouth Daily. (Patient taking differently: Take 2 tablets by mouth Daily.), Disp: 90 tablet, Rfl: 3    carvedilol (COREG) 25 MG tablet, Take 1 tablet by mouth 2 (Two) Times a Day With Meals., Disp: 180 tablet, Rfl: 3    clopidogrel (PLAVIX) 75 MG tablet, TAKE 1 TABLET BY MOUTH DAILY, Disp: 30 tablet, Rfl: 2    Eliquis 5 MG tablet tablet, Take 1 tablet by mouth Every 12 (Twelve) Hours., Disp: 60 tablet, Rfl: 6    flecainide (TAMBOCOR) 50 MG tablet, Take 1 tablet by mouth 2 (Two) Times a Day., Disp: 60 tablet, Rfl: 11    FLUoxetine (PROzac) 60 MG tablet, Take 1 tablet by mouth Daily., Disp: , Rfl:      "furosemide (LASIX) 20 MG tablet, Take 1 tablet by mouth As Needed., Disp: , Rfl:     hydrOXYzine (ATARAX) 10 MG tablet, Take 1 tablet by mouth 3 (Three) Times a Day As Needed for Itching., Disp: , Rfl:     losartan (COZAAR) 100 MG tablet, Take 1 tablet by mouth Daily., Disp: 90 tablet, Rfl: 3    phenytoin ER (DILANTIN) 100 MG capsule, Take 5 capsules by mouth Every Night., Disp: , Rfl:      Objective   Vital Signs:  /60   Pulse 70   Ht 165.1 cm (65\")   Wt 92.5 kg (204 lb)   SpO2 95%   BMI 33.95 kg/m²   Estimated body mass index is 33.95 kg/m² as calculated from the following:    Height as of this encounter: 165.1 cm (65\").    Weight as of this encounter: 92.5 kg (204 lb).      Constitutional:       Appearance: Healthy appearance. Not in distress.   Pulmonary:      Effort: Pulmonary effort is normal.      Breath sounds: Normal breath sounds.   Cardiovascular:      PMI at left midclavicular line. Normal rate. Regular rhythm.      Murmurs: There is no murmur.      No gallop.  No click. No rub.   Edema:     Peripheral edema absent.   Abdominal:      General: Bowel sounds are normal.   Musculoskeletal: Normal range of motion.      Cervical back: Normal range of motion and neck supple. Skin:     General: Skin is warm.   Neurological:      Mental Status: Alert and oriented to person, place and time.        Result Review :  The following data was reviewed by: ARTI Barragan on 01/30/2024:  CMP   CMP          3/6/2023    11:24 3/9/2023    15:12 9/26/2023    11:40   CMP   Glucose  86     BUN  18     Creatinine 0.50  0.42  0.60    EGFR  114.3     Sodium  142     Potassium  4.3     Chloride  104     Calcium  9.0     BUN/Creatinine Ratio  42.9     Anion Gap  14.0       Lipid Panel   Lipid Panel          3/9/2023    15:12   Lipid Panel   Total Cholesterol 172    Triglycerides 96    HDL Cholesterol 69    VLDL Cholesterol 17    LDL Cholesterol  86    LDL/HDL Ratio 1.21      BMP   BMP          3/6/2023    11:24 " 3/9/2023    15:12 9/26/2023    11:40   BMP   BUN  18     Creatinine 0.50  0.42  0.60    Sodium  142     Potassium  4.3     Chloride  104     CO2  24.0     Calcium  9.0       Data reviewed : Cardiology studies echo           Assessment and Plan   Diagnoses and all orders for this visit:    1. Paroxysmal SVT (supraventricular tachycardia) (Primary)  -No complaints of palpitations or fluttering in her chest as of late  - Managed by electrophysiology  - Continue on carvedilol as well as flecainide      2. Aneurysm of ascending aorta without rupture  3. History of aortic dissection  4. Essential hypertension  -Followed closely by cardiothoracic surgery as well as vascular surgery given history of aortic dissection.  -Blood pressures are slightly elevated given that her goal systolic less than 120  - Will increase amlodipine to 10 mg daily  - Continue carvedilol 25 mg twice daily and losartan 100 mg daily    5. Hyperlipidemia, unspecified hyperlipidemia type  -Patient is taking 40 mg of Lipitor daily, managed by PCP.    6. Pulmonary embolism, unspecified chronicity, unspecified pulmonary embolism type, unspecified whether acute cor pulmonale present  -Continue Eliquis 5 mg twice daily  - It looks like other providers on her care team are discussing need for lifelong anticoagulation.               Follow Up   No follow-ups on file.  Patient was given instructions and counseling regarding her condition or for health maintenance advice. Please see specific information pulled into the AVS if appropriate.       EMR Dragon/Transcription disclaimer: Much of this encounter note is an electronic transcription/translation of spoken language to printed text. The electronic translation of spoken language may permit erroneous, or at times, nonsensical words or phrases to be inadvertently transcribed; although I have reviewed the note for such errors, some may still exist.

## 2024-03-14 RX ORDER — FLECAINIDE ACETATE 50 MG/1
50 TABLET ORAL 2 TIMES DAILY
Qty: 60 TABLET | Refills: 11 | Status: SHIPPED | OUTPATIENT
Start: 2024-03-14

## 2024-03-14 NOTE — TELEPHONE ENCOUNTER
Rx Refill Note  Requested Prescriptions     Pending Prescriptions Disp Refills    flecainide (TAMBOCOR) 50 MG tablet 60 tablet 11     Sig: Take 1 tablet by mouth 2 (Two) Times a Day.      Last office visit with prescribing clinician: 4/11/2023      Next office visit with prescribing clinician: Visit date not found            Nae Jeffers MA  03/14/24, 08:49 CDT

## 2024-04-19 ENCOUNTER — TELEPHONE (OUTPATIENT)
Dept: VASCULAR SURGERY | Facility: CLINIC | Age: 59
End: 2024-04-19
Payer: MEDICAID

## 2024-04-22 ENCOUNTER — HOSPITAL ENCOUNTER (EMERGENCY)
Facility: HOSPITAL | Age: 59
Discharge: HOME | End: 2024-04-22
Attending: EMERGENCY MEDICINE | Admitting: EMERGENCY MEDICINE
Payer: MEDICAID

## 2024-04-22 ENCOUNTER — HOSPITAL ENCOUNTER (OUTPATIENT)
Dept: CT IMAGING | Facility: HOSPITAL | Age: 59
Discharge: HOME | End: 2024-04-22
Payer: MEDICAID

## 2024-04-22 VITALS
HEIGHT: 65 IN | TEMPERATURE: 98.7 F | HEART RATE: 68 BPM | SYSTOLIC BLOOD PRESSURE: 146 MMHG | OXYGEN SATURATION: 92 % | WEIGHT: 205 LBS | BODY MASS INDEX: 34.16 KG/M2 | RESPIRATION RATE: 20 BRPM | DIASTOLIC BLOOD PRESSURE: 58 MMHG
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
## 2024-04-22 DIAGNOSIS — S00.03XA HEMATOMA OF SCALP, INITIAL ENCOUNTER: ICD-10-CM

## 2024-04-22 DIAGNOSIS — I71.012 DISSECTION OF DESCENDING THORACIC AORTA: ICD-10-CM

## 2024-04-22 DIAGNOSIS — S00.03XA CONTUSION OF RIGHT TEMPOROFRONTAL SCALP, INITIAL ENCOUNTER: ICD-10-CM

## 2024-04-22 LAB — CREAT BLDA-MCNC: 0.7 MG/DL (ref 0.6–1.3)

## 2024-04-22 PROCEDURE — 74174 CTA ABD&PLVS W/CONTRAST: CPT

## 2024-04-22 PROCEDURE — 99282 EMERGENCY DEPT VISIT SF MDM: CPT

## 2024-04-22 PROCEDURE — 25510000001 IOPAMIDOL PER 1 ML

## 2024-04-22 PROCEDURE — 71275 CT ANGIOGRAPHY CHEST: CPT

## 2024-04-22 PROCEDURE — 82565 ASSAY OF CREATININE: CPT

## 2024-04-22 RX ADMIN — IOPAMIDOL 100 ML: 755 INJECTION, SOLUTION INTRAVENOUS at 12:14

## 2024-04-22 NOTE — ED PROVIDER NOTES
Subjective   History of Present Illness  Patient is a 58-year-old lady who came to the ER after she fell in the hospital she was going to her appointment and hit her head.  No loss of consciousness.  The patient said the last night she fell and hit her head and has a hematoma to her forehead because of that.  There was no loss of consciousness she states that she loses her balance and her legs gave away.  So these are more or less mechanical falls there was no syncope or shortness of breath or chest pain associate with this.  There was no loss of consciousness associate with this.  Her GCS of 15 of 15 at this time.  Moving all 4 extremities.    Fall  Mechanism of injury: fall    Injury location:  Head/neck and face  Head/neck injury location:  Head  Facial injury location:  Face  Incident location: At the hospital and also once at home.  Arrived directly from scene: yes    Fall:     Fall occurred:  Walking    Height of fall:  Floor level    Impact surface:  Hard floor    Entrapped after fall: no    Suspicion of alcohol use: no    Suspicion of drug use: no    Tetanus status:  Up to date  Prior to arrival data:     Loss of consciousness: no      Amnesic to event: no      Airway condition since incident:  Stable    Breathing condition since incident:  Stable    Circulation condition since incident:  Stable    Mental status condition since incident:  Stable    Disability condition since incident:  Stable  Associated symptoms: no abdominal pain, no back pain, no blindness, no chest pain, no difficulty breathing, no headaches, no hearing loss, no loss of consciousness, no nausea, no neck pain, no seizures and no vomiting    Risk factors: no CHF, no COPD and no steroid use        Review of Systems   Constitutional: Negative.    HENT: Negative.  Negative for hearing loss.    Eyes: Negative.  Negative for blindness.   Respiratory: Negative.     Cardiovascular: Negative.  Negative for chest pain.   Gastrointestinal: Negative.   Negative for abdominal pain, nausea and vomiting.   Musculoskeletal: Negative.  Negative for back pain and neck pain.   Skin: Negative.    Neurological: Negative.  Negative for seizures, loss of consciousness and headaches.   All other systems reviewed and are negative.      Past Medical History:   Diagnosis Date    Aneurysm     Anxiety     Claustrophobia 1971    Depression     Heart valve disease 2023    Hyperlipidemia 2000    Multiple rib fractures 2023    Pulmonary embolism     Seizures        No Known Allergies    Past Surgical History:   Procedure Laterality Date    ABDOMINAL AORTIC ANEURYSM REPAIR WITH ENDOGRAFT N/A 2023    Procedure: THORACIC AORTIC ANEURYSM REPAIR USING ENDOGRAFT;  Surgeon: Jonnathan Farrar DO;  Location:  [facility];  Service: Vascular;  Laterality: N/A;    ABDOMINAL SURGERY      AORTIC VALVE REPAIR/REPLACEMENT  2023    AORTIC VALVE SURGERY  2023    BRACHIAL ARTERY EXPLORATION N/A 2023    Procedure: BRACHIAL ARTERY EXPOSURE FOR LASER FENESTRATION OF THORACIC ENDOGRAFT FOR STENT PLACEMENT;  Surgeon: Jonnathan Farrar DO;  Location:  [facility];  Service: Vascular;  Laterality: N/A;     SECTION      X 2    CORONARY STENT PLACEMENT  2023    LUMBAR DRAIN INSERTION EXTERNAL N/A 2023    Procedure: LUMBAR DRAIN INSERTION EXTERNAL;  Surgeon: Elie Cerda MD;  Location:  [facility];  Service: Neurosurgery;  Laterality: N/A;    THORACIC AORTIC ANEURYSM REPAIR N/A 2023    Procedure: THORACIC AORTIC ANEURYSM REPAIR;  Surgeon: Riley Jefferson MD;  Location:  [facility];  Service: Vascular;  Laterality: N/A;       Family History   Problem Relation Age of Onset    Diabetes Mother     Diabetes Father     Hypertension Father     Hypertension Maternal Grandfather     Stroke Maternal Grandfather     Hypertension Paternal Grandfather     Hypertension Maternal Aunt        Social  History     Socioeconomic History    Marital status:    Tobacco Use    Smoking status: Never     Passive exposure: Past (Parents)    Smokeless tobacco: Never   Vaping Use    Vaping status: Never Used   Substance and Sexual Activity    Alcohol use: Never    Drug use: Never    Sexual activity: Not Currently     Birth control/protection: Abstinence, Tubal ligation           Objective   Physical Exam  Vitals and nursing note reviewed. Exam conducted with a chaperone present.   Constitutional:       General: She is not in acute distress.     Appearance: Normal appearance. She is not toxic-appearing or diaphoretic.   HENT:      Head: Normocephalic and atraumatic. Right periorbital erythema present. No raccoon eyes, Norwood's sign, contusion or left periorbital erythema.      Jaw: There is normal jaw occlusion.      Comments: Patient has got a supraorbital hematoma to the right eye and right frontal hematoma.  There is no hyphema     Right Ear: Tympanic membrane normal. No hemotympanum.      Left Ear: Tympanic membrane normal. No hemotympanum.      Nose: Nose normal.      Mouth/Throat:      Mouth: Mucous membranes are moist.      Pharynx: No oropharyngeal exudate.   Eyes:      General: Lids are normal.      Extraocular Movements: Extraocular movements intact.      Right eye: No nystagmus.      Left eye: No nystagmus.      Conjunctiva/sclera: Conjunctivae normal.      Right eye: Right conjunctiva is not injected.      Left eye: Left conjunctiva is not injected.      Pupils: Pupils are equal, round, and reactive to light.      Comments: No entrapment extraocular movements are intact no hyphema.   Neck:      Thyroid: No thyroid mass.      Vascular: No carotid bruit.      Trachea: Trachea and phonation normal. No tracheal tenderness or tracheal deviation.      Comments: Neck is supple  Cardiovascular:      Rate and Rhythm: Normal rate and regular rhythm.      Chest Wall: PMI is not displaced.      Pulses: Normal pulses.            Carotid pulses are 2+ on the right side and 2+ on the left side.       Radial pulses are 2+ on the right side and 2+ on the left side.        Femoral pulses are 2+ on the right side and 2+ on the left side.       Dorsalis pedis pulses are 2+ on the right side and 2+ on the left side.        Posterior tibial pulses are 2+ on the right side and 2+ on the left side.      Heart sounds: Normal heart sounds. No murmur heard.     No gallop.   Pulmonary:      Effort: Pulmonary effort is normal. No tachypnea, respiratory distress or retractions.      Breath sounds: Normal breath sounds. No stridor, decreased air movement or transmitted upper airway sounds.   Abdominal:      General: Abdomen is flat. Bowel sounds are normal. There is no distension or abdominal bruit.      Palpations: Abdomen is soft. There is no mass or pulsatile mass.      Tenderness: There is no abdominal tenderness. There is no right CVA tenderness or left CVA tenderness.      Hernia: No hernia is present.   Musculoskeletal:         General: Normal range of motion.      Right shoulder: Normal.      Left shoulder: Normal.      Cervical back: Neck supple. No signs of trauma, rigidity or crepitus. No spinous process tenderness or muscular tenderness.      Thoracic back: Normal.      Lumbar back: Normal.      Right hip: Normal.      Left hip: Normal.      Comments: Axial skeletal examination reveals no obvious deformity of the upper or lower extremity    Neurovascular examination is unremarkable and bone examination is unremarkable upper lower extremity L-spine T-spine C-spine examination negative.   Skin:     General: Skin is warm.      Capillary Refill: Capillary refill takes less than 2 seconds.      Coloration: Skin is not ashen, cyanotic or pale.   Neurological:      General: No focal deficit present.      Mental Status: She is alert and oriented to person, place, and time.      GCS: GCS eye subscore is 4. GCS verbal subscore is 5. GCS motor  subscore is 6.      Cranial Nerves: Cranial nerves 2-12 are intact. No cranial nerve deficit or facial asymmetry.      Sensory: Sensation is intact. No sensory deficit.      Motor: Motor function is intact. No weakness, atrophy or abnormal muscle tone.      Deep Tendon Reflexes:      Reflex Scores:       Bicep reflexes are 2+ on the right side and 2+ on the left side.       Patellar reflexes are 2+ on the right side and 2+ on the left side.  Psychiatric:         Attention and Perception: Attention normal.         Mood and Affect: Mood normal.         Speech: Speech normal.         Behavior: Behavior normal.         Procedures           ED Course                                             Medical Decision Making  Case were discussed at length with the patient she fell today she has had falls at home also.  I have offered her evaluation assessment including lab workup CAT scans including CAT scan of the head and if needed CAT scan of the chest and abdomen.  The patient needs a workup for this falling episodes although there is no syncope or loss of caution associate with this this could be mechanical but there is no way to assure that without further workup.  Also the possible intracranial hemorrhage bleeding related injuries cannot be ruled out and therefore CTs will need to be performed for that.  Patient is awake, alert and oriented knows where she is and is answering question appropriately and refuses all test lab workup scans or x-rays she wants to go home    Problems Addressed:  Contusion of right temporofrontal scalp, initial encounter: acute illness or injury  Fall, initial encounter: acute illness or injury  Hematoma of scalp, initial encounter: acute illness or injury    Risk  Risk Details: Patient with a possible mechanical fall she is refusing any chest at this time refusing CAT scans.  The patient has requested to leave the ED and does not want to be admitted nor does he want any further work-up at this  time. The patient reason(s) for leaving include, but are not limited to, the following: Feels better and wants to go home. I believe this patient is of sound mind and competent to refuse medical care. The patient is responding and asking questions appropriately. The patient is oriented to person, place and time. The patient is not psychotic, delusional, suicidal, homicidal or hallucinating. The patient demonstrates a normal mental capacity to make decisions regarding their healthcare. The patient is clinically sober and does not appear to be under the influence of any illicit drugs at this time. The patient has been advised of the risks, in layman terms, of leaving  which include, but are not limited to death, coma, permanent disability, loss of current lifestyle, delay in diagnosis. Alternatives have been offered - the patient remains steadfast in their wish to leave. The patient has been advised that should they change their mind they are welcome to return to this hospital, or any other,  I have provided appropriate prescriptions, referrals, and discharge instructions.. The above discussion was witnessed by another member of staff.         Final diagnoses:   Fall, initial encounter   Contusion of right temporofrontal scalp, initial encounter   Hematoma of scalp, initial encounter       ED Disposition  ED Disposition       ED Disposition   Discharge    Condition   Stable    Comment   --               Gill Ordoñez MD  [address] [place] [id number] [phone]               Medication List      No changes were made to your prescriptions during this visit.            Raymon Cary MD  04/22/24 1311       Raymon Cary MD  04/22/24 1317     no

## 2024-05-23 ENCOUNTER — OFFICE VISIT (OUTPATIENT)
Dept: CARDIAC SURGERY | Facility: CLINIC | Age: 59
End: 2024-05-23
Payer: MEDICAID

## 2024-05-23 VITALS
OXYGEN SATURATION: 95 % | SYSTOLIC BLOOD PRESSURE: 144 MMHG | BODY MASS INDEX: 32.19 KG/M2 | HEART RATE: 77 BPM | DIASTOLIC BLOOD PRESSURE: 42 MMHG | HEIGHT: 65 IN | WEIGHT: 193.2 LBS

## 2024-05-23 DIAGNOSIS — I71.012 DISSECTION OF DESCENDING THORACIC AORTA: Primary | ICD-10-CM

## 2024-05-23 DIAGNOSIS — I71.00 DISSECTION OF AORTA, UNSPECIFIED PORTION OF AORTA: Primary | ICD-10-CM

## 2024-05-23 PROCEDURE — 3077F SYST BP >= 140 MM HG: CPT | Performed by: SURGERY

## 2024-05-23 PROCEDURE — 99214 OFFICE O/P EST MOD 30 MIN: CPT | Performed by: SURGERY

## 2024-05-23 PROCEDURE — 3078F DIAST BP <80 MM HG: CPT | Performed by: SURGERY

## 2024-05-23 NOTE — LETTER
May 31, 2024       No Recipients    Patient: Shilpa Zhang   YOB: 1965   Date of Visit: 5/23/2024       Dear Gill Ordoñez MD,    Shilpa Zhang was in my office today. Below are the relevant portions of my assessment and plan of care.    Ms. Zhang is a 58-year-old female. She presents in follow-up just over 1 year since last follow-up. She is 15 months status post TEVAR for type B aortic dissection and stenting of her celiac artery. Also performed coverage of her left subclavian with laser fenestration and stenting into the LS origin. She returns today with a follow-up surveillance scan.    Upon reviewing the patient's imaging, it was observed that the false lumen in the abdominal portion of the aneurysm is now more thrombosed, and it is reported as a type I endoleak, which I suspect is retrograde flow in the false lumen up to the level of the distal stent. Overall, appears satisfactory and appears to be evolving as expected. The patient's blood pressure is well-managed, and she is not a smoker. However, she does have an ascending aortic aneurysm that requires continued surveillance. We will continue to survey this, her TEVAR and abdominal residual dissection.    The patient is scheduled for a follow-up visit in 1 year, which will include a repeat CTA of the chest, abdomen, and pelvis with myself and Dr. Farrar. Thank you for trusting me with the care of Ms. Zhang.  Please do not hesitate to call with any questions or concerns.    Sincerely,        Riley Jefferson MD        CC:   No Recipients

## 2024-05-28 ENCOUNTER — OFFICE VISIT (OUTPATIENT)
Dept: CARDIOLOGY | Facility: CLINIC | Age: 59
End: 2024-05-28
Payer: MEDICAID

## 2024-05-28 VITALS
DIASTOLIC BLOOD PRESSURE: 56 MMHG | BODY MASS INDEX: 32.32 KG/M2 | WEIGHT: 194 LBS | SYSTOLIC BLOOD PRESSURE: 148 MMHG | HEIGHT: 65 IN | HEART RATE: 76 BPM | OXYGEN SATURATION: 98 %

## 2024-05-28 DIAGNOSIS — I47.10 SVT (SUPRAVENTRICULAR TACHYCARDIA): ICD-10-CM

## 2024-05-28 DIAGNOSIS — I71.00 DISSECTION OF AORTA, UNSPECIFIED PORTION OF AORTA: ICD-10-CM

## 2024-05-28 DIAGNOSIS — I47.10 PAROXYSMAL SVT (SUPRAVENTRICULAR TACHYCARDIA): Primary | ICD-10-CM

## 2024-05-28 DIAGNOSIS — I71.012 DISSECTION OF DESCENDING THORACIC AORTA: ICD-10-CM

## 2024-05-28 PROCEDURE — 3077F SYST BP >= 140 MM HG: CPT | Performed by: PHYSICIAN ASSISTANT

## 2024-05-28 PROCEDURE — 99214 OFFICE O/P EST MOD 30 MIN: CPT | Performed by: PHYSICIAN ASSISTANT

## 2024-05-28 PROCEDURE — 3078F DIAST BP <80 MM HG: CPT | Performed by: PHYSICIAN ASSISTANT

## 2024-05-28 PROCEDURE — 93000 ELECTROCARDIOGRAM COMPLETE: CPT | Performed by: PHYSICIAN ASSISTANT

## 2024-05-28 NOTE — PROGRESS NOTES
"[facility] -  CLINIC FOLLOW UP     Patient Care Team:  Gill Ordoñez MD as PCP - General (Family Medicine)  Jefferson, Riley TREVIZO MD as Consulting Physician (Cardiothoracic Surgery)    Chief Complaint: follow up for SVT     Subjective   EP Problems:  1.  Sustained SVT     Cardiology Problems:  1.  Type B aortic dissection s/p TEVAR  2.  Ascending aortic aneurysm  3.  HTN     Medical Problems:  1.  Seizure disorder  2. PE 6/2023  -provoked after fall  3. Hx of thrombus  -?DVT on BCPs      HPI: Today I had the pleasure of seeing Shilpa Zhang in the cardiology clinic for follow up. She is a pleasant 57 year old female with a history of sustained SVT initially thought to be atrial fibrillation after her endovascular repair of type B aortic dissection. A follow up Holter monitor did not show evidence of afib but rather clear sustained SVT. Dr. Lancaster initiated Flecainide, discontinued eliquis at that time and referred her to EP. Previously she was on the ablation schedule, but she canceled last year.     She is off of eliquis now after she started having numbness. She is now on aspirin only. However, Dr. Farrar had previously communicated that he would prefer her to be on both plavix and aspirin (if Eliquis was discontinued), but the patient states plavix was never renewed. She missed her Vascular clinic follow up with Sharmila April 22nd.     She is still on carvedilol and flecainide. QRS duration today on EKG is 94ms. She would prefer to continue medication for now since it's working and her mom is in the nursing home as well, so things are stressful.     Objective     Visit Vitals  /56   Pulse 76   Ht 165.1 cm (65\")   Wt 88 kg (194 lb)   SpO2 98%   BMI 32.28 kg/m²           Vitals reviewed.   Constitutional:       Appearance: Not in distress. Obese.   Eyes:      Extraocular Movements: Extraocular movements intact.      Conjunctiva/sclera: Conjunctivae normal.      Pupils: Pupils " are equal, round, and reactive to light.   HENT:      Head: Normocephalic and atraumatic.      Nose: Nose normal.    Mouth/Throat:      Lips: Pink.      Mouth: Mucous membranes are moist.      Pharynx: Oropharynx is clear.   Neck:      Vascular: No carotid bruit or JVD. JVD normal.   Pulmonary:      Effort: Pulmonary effort is normal.      Breath sounds: Normal breath sounds.   Chest:      Chest wall: Not tender to palpatation.   Cardiovascular:      PMI at left midclavicular line. Normal rate. Regular rhythm. Normal S1. Normal S2.       Murmurs: There is a grade 1/6 systolic murmur.      No gallop.  No rub.   Pulses:     Radial: 2+ bilaterally.  Edema:     Peripheral edema absent.   Abdominal:      Palpations: Abdomen is soft.   Musculoskeletal: Normal range of motion.      Extremities: No clubbing present.     Cervical back: Normal range of motion. Skin:     General: Skin is warm and dry.   Neurological:      General: No focal deficit present.      Mental Status: Alert and oriented to person, place, and time.   Psychiatric:         Attention and Perception: Attention normal.         Mood and Affect: Affect normal.         Speech: Speech normal.         Behavior: Behavior normal.         Cognition and Memory: Cognition normal.             The following portions of the patient's history were reviewed and updated as appropriate: allergies, current medications, past medical history, past social history, past and problem list.     Review of Systems   Constitutional: Negative.    HENT: Negative.     Eyes: Negative.    Respiratory: Negative.     Cardiovascular: Negative.    Gastrointestinal: Negative.    Endocrine: Negative.    Genitourinary: Negative.    Musculoskeletal: Negative.    Skin: Negative.    Allergic/Immunologic: Negative.    Neurological: Negative.    Hematological: Negative.    Psychiatric/Behavioral: Negative.             ECG 12 Lead    Date/Time: 5/28/2024 1:43 PM  Performed by: Rebeca Justice,  PA    Authorized by: Rebeca Justice PA  Comparison: compared with previous ECG from 11/23/2023  Rhythm: sinus rhythm  Rate: normal  BPM: 79  Conduction: incomplete right bundle branch block  QRS axis: right  Other findings: non-specific ST-T wave changes    Clinical impression: abnormal EKG          Medication Review: yes    Current Outpatient Medications:     amLODIPine (NORVASC) 10 MG tablet, Take 1 tablet by mouth Daily., Disp: 90 tablet, Rfl: 3    atorvastatin (LIPITOR) 20 MG tablet, Take 2 tablets by mouth Daily. (Patient taking differently: Take 2 tablets by mouth Daily. Pt taking 40 mg), Disp: , Rfl:     carvedilol (COREG) 25 MG tablet, Take 1 tablet by mouth 2 (Two) Times a Day With Meals., Disp: 180 tablet, Rfl: 3    clopidogrel (PLAVIX) 75 MG tablet, TAKE 1 TABLET BY MOUTH DAILY, Disp: 30 tablet, Rfl: 2    flecainide (TAMBOCOR) 50 MG tablet, Take 1 tablet by mouth 2 (Two) Times a Day., Disp: 60 tablet, Rfl: 11    FLUoxetine (PROzac) 60 MG tablet, Take 1 tablet by mouth Daily., Disp: , Rfl:     furosemide (LASIX) 20 MG tablet, Take 1 tablet by mouth As Needed., Disp: , Rfl:     hydrOXYzine (ATARAX) 10 MG tablet, Take 1 tablet by mouth 3 (Three) Times a Day As Needed for Itching., Disp: , Rfl:     losartan (COZAAR) 100 MG tablet, Take 1 tablet by mouth Daily., Disp: 90 tablet, Rfl: 3    phenytoin ER (DILANTIN) 100 MG capsule, Take 5 capsules by mouth Every Night., Disp: , Rfl:    No Known Allergies    I have reviewed       CBC:  Lab Results - Last 18 Months   Lab Units 02/07/23  0443   WBC 10*3/mm3 10.85*   HEMOGLOBIN g/dL 11.9*   HEMATOCRIT % 38.3   PLATELETS 10*3/mm3 262      BMP/CMP:  Lab Results - Last 18 Months   Lab Units 04/22/24  1118 09/26/23  1140 03/09/23  1512   SODIUM mmol/L  --   --  142   POTASSIUM mmol/L  --   --  4.3   CHLORIDE mmol/L  --   --  104   CO2 mmol/L  --   --  24.0   GLUCOSE mg/dL  --   --  86   BUN mg/dL  --   --  18   CREATININE mg/dL 0.70   < > 0.42*   CALCIUM mg/dL  --   --  " 9.0    < > = values in this interval not displayed.     BNP: No results for input(s): \"PROBNP\" in the last 66984 hours.   THYROID:  Lab Results - Last 18 Months   Lab Units 03/09/23  1512   TSH uIU/mL 2.850       Results for orders placed during the hospital encounter of 01/31/23    Adult Transthoracic Echo Complete W/ Cont if Necessary Per Protocol    Interpretation Summary    Left ventricular systolic function is normal. Left ventricular ejection fraction appears to be 56 - 60%.    Left ventricular wall thickness is consistent with mild concentric hypertrophy.    Normal right ventricular cavity size and systolic function noted.    Mild aortic valve regurgitation is present.    Moderate dilation of the aortic root is present. Mild dilation of the sinuses of Valsalva is present. Mild dilation of the descending aorta present.     Assessment:   Diagnoses and all orders for this visit:    1. Paroxysmal SVT (supraventricular tachycardia) (Primary)  -     ECG 12 Lead; Future    2. SVT (supraventricular tachycardia)    3. Dissection of descending thoracic aorta    4. Dissection of aorta, unspecified portion of aorta    Other orders  -     ECG 12 Lead    SVT: Controlled overall on Flecainide and carvedilol without recurrence. We discussed potential option of ablation, but she is hesitant and feels most comfortable  with continue conservative management. Follow up every 6 months for repeat EKG and labs.     Dissection of aorta: Followed by Dr. Jefferson seen May 23rd.   -Thoracic aortic aneurysm repair on 2/2/23, missed recent vascular appt.   -left subclavian stent and celiac artery stent appear to be widely patent.   -Currently only on aspirin only, will reach out to Vascular APRN about missed appt.   -She was on Eliquis due to PE dx 6/2023 and completed 6 months treatment.     I spent 30 minutes caring for Shilpa on this date of service. This time includes time spent by me in the following activities:preparing for the " visit, reviewing tests, obtaining and/or reviewing a separately obtained history, performing a medically appropriate examination and/or evaluation , counseling and educating the patient/family/caregiver, ordering medications, tests, or procedures, referring and communicating with other health care professionals , documenting information in the medical record, and independently interpreting results and communicating that information with the patient/family/caregiver        Electronically signed by FREDDIE Montana

## 2024-05-31 NOTE — PROGRESS NOTES
[facility] Cardiothoracic Surgery  PROGRESS NOTE   CC: Postop TEVAR with laser fenestration of the left subclavian    Procedure Performed:TEVAR from Zone 2 (Patient with Bovine Aortic Arch, Proximal Extent is up to Origin of Common Innominate Trunk) to Celiac Artery;  Stenting of Celiac Artery; Laser Fenestration of Ostium of LSA into Thoracic Endograft with Stenting  Date of Procedure: 02/02/2023    Subjective:   History of Present Illness  The patient is a 58-year-old female who presents for a follow-up surveillance scan.    The patient reports a general sense of well-being. However, she has experienced several falls, which she attributes to tripping incidents, unrelated to her cardiac condition. Her last fall occurred during her last visit, necessitating an emergency room visit. Since then, she has not experienced any falls, despite not experiencing dizziness. She acknowledges a history of clumsiness. She denies experiencing any back or chest pain. Her cardiac health remains stable, with no reported issues. She is a non-smoker and has no history of smoking. Her blood pressure, previously recorded as 140/70 for several years, is currently well-controlled.      ROS: No fevers chills chest pain recent illnesses      Objective:    PE:  Vitals:    05/23/24 1120   BP: 144/42   Pulse: 77   SpO2: 95%     GENERAL: NAD, resting comfortably, normal palor  CARDIOVASCULAR: regular, regular rate, sinus  PULMONARY: Normal bilateral breath sounds, no labored breathing  ABDOMEN: soft, nt/nd  EXTREMITIES: mild peripheral edema, normal pulses, normal ROM      Physical Exam        Lab Results (last 72 hours)       ** No results found for the last 72 hours. **              Results  I personally reviewed CTA and the following is my interpretation:    Scans show excellent positioning of the TEVAR graft and patency of both the left subclavian and celiac stents. Ascending aorta is unchanged at about 4.7 x 4.8 cm.  The false lumen in the abdominal portion of the aneurysm is more thrombosed now and what is reported as a type I endoleak I suspect is retrograde flow in the false lumen up to the level of the distal stent.    Assessment & Plan     Assessment & Plan  Ms. Zhang is a 58-year-old female. She presents in follow-up just over 1 year since last follow-up. She is 15 months status post TEVAR for type B aortic dissection and stenting of her celiac artery. Also performed coverage of her left subclavian with laser fenestration and stenting into the LS origin. She returns today with a follow-up surveillance scan.    Upon reviewing the patient's imaging, it was observed that the false lumen in the abdominal portion of the aneurysm is now more thrombosed, and it is reported as a type I endoleak, which I suspect is retrograde flow in the false lumen up to the level of the distal stent. Overall, appears satisfactory and appears to be evolving as expected. The patient's blood pressure is well-managed, and she is not a smoker. However, she does have an ascending aortic aneurysm that requires continued surveillance. We will continue to survey this, her TEVAR and abdominal residual dissection.    The patient is scheduled for a follow-up visit in 1 year, which will include a repeat CTA of the chest, abdomen, and pelvis with myself and Dr. Farrar. Thank you for trusting me with the care of Ms. Zhang.  Please do not hesitate to call with any questions or concerns.      Riley Jefferson MD   Cardiothoracic Surgeon    Patient or patient representative verbalized consent for the use of Ambient Listening during the visit with  Riley Jefferson MD for chart documentation. 5/31/2024  11:50 CDT

## 2024-06-06 ENCOUNTER — TELEPHONE (OUTPATIENT)
Dept: VASCULAR SURGERY | Facility: CLINIC | Age: 59
End: 2024-06-06
Payer: MEDICAID

## 2024-06-07 ENCOUNTER — OFFICE VISIT (OUTPATIENT)
Dept: VASCULAR SURGERY | Facility: CLINIC | Age: 59
End: 2024-06-07
Payer: MEDICAID

## 2024-06-07 VITALS
WEIGHT: 192 LBS | HEIGHT: 65 IN | SYSTOLIC BLOOD PRESSURE: 130 MMHG | OXYGEN SATURATION: 98 % | BODY MASS INDEX: 31.99 KG/M2 | DIASTOLIC BLOOD PRESSURE: 58 MMHG | HEART RATE: 76 BPM

## 2024-06-07 DIAGNOSIS — I10 ESSENTIAL HYPERTENSION: ICD-10-CM

## 2024-06-07 DIAGNOSIS — I71.012 DISSECTION OF DESCENDING THORACIC AORTA: Primary | ICD-10-CM

## 2024-06-07 PROCEDURE — 1160F RVW MEDS BY RX/DR IN RCRD: CPT | Performed by: NURSE PRACTITIONER

## 2024-06-07 PROCEDURE — 3075F SYST BP GE 130 - 139MM HG: CPT | Performed by: NURSE PRACTITIONER

## 2024-06-07 PROCEDURE — 3078F DIAST BP <80 MM HG: CPT | Performed by: NURSE PRACTITIONER

## 2024-06-07 PROCEDURE — 99214 OFFICE O/P EST MOD 30 MIN: CPT | Performed by: NURSE PRACTITIONER

## 2024-06-07 PROCEDURE — 1159F MED LIST DOCD IN RCRD: CPT | Performed by: NURSE PRACTITIONER

## 2024-06-07 NOTE — LETTER
"June 14, 2024       No Recipients    Patient: Shilpa Zhang   YOB: 1965   Date of Visit: 6/7/2024     Dear Gill Ordoñez MD:       Thank you for referring Shilpa Zhang to me for evaluation. Below are the relevant portions of my assessment and plan of care.    If you have questions, please do not hesitate to call me. I look forward to following Shilpa along with you.         Sincerely,        ARTI Bower        CC:   No Recipients    Sharmila Ramos APRN  06/14/24 1816  Sign when Signing Visit  06/07/2024       Gill Ordoñez MD  [address] [place], [place] [id number]        Shilpa Zhang  1965      Chief Complaint   Patient presents with   • Follow-up     6 month follow up w/ testing done 4/22/24. Last seen 10/24/23. Patient denies any new or worsening back or abdominal pains.        Dear Gill Ordoñez MD:   I had the pleasure of seeing you patient in the office today for follow up.  As you recall, the patient is a 59 y.o. female who we are currently following for routine health maintenance.  She did undergo TEVAR on 2/2/23.  Overall she is doing well and denies any worsening back or abdominal pains.  He reports she is feeling well other than fatigue. She is maintained on Eliquis and Plavix.  Her incisions healed without difficulty. She did have noninvasive testing performed which I did personally review.      Review of Systems   Constitutional:  Positive for fatigue.   HENT: Negative.     Eyes: Negative.    Respiratory: Negative.     Cardiovascular: Negative.    Gastrointestinal: Negative.    Endocrine: Negative.    Genitourinary: Negative.    Musculoskeletal: Negative.    Skin: Negative.    Allergic/Immunologic: Negative.    Neurological:  Positive for weakness.   Hematological: Negative.    Psychiatric/Behavioral: Negative.     All other systems reviewed and are negative.       /58   Pulse 76   Ht 165.1 cm (65\")   Wt 87.1 kg " (192 lb)   SpO2 98%   BMI 31.95 kg/m²    Physical Exam  Vitals and nursing note reviewed.   Constitutional:       Appearance: She is well-developed.   HENT:      Head: Normocephalic and atraumatic.   Eyes:      General: No scleral icterus.     Pupils: Pupils are equal, round, and reactive to light.   Neck:      Thyroid: No thyromegaly.      Vascular: No carotid bruit or JVD.   Cardiovascular:      Rate and Rhythm: Normal rate and regular rhythm.      Pulses:           Carotid pulses are 2+ on the right side and 2+ on the left side.       Radial pulses are 2+ on the right side and 2+ on the left side.        Femoral pulses are 2+ on the right side and 2+ on the left side.       Popliteal pulses are 2+ on the right side and 2+ on the left side.        Dorsalis pedis pulses are 2+ on the right side and 2+ on the left side.        Posterior tibial pulses are 2+ on the right side and 2+ on the left side.      Heart sounds: Normal heart sounds.   Pulmonary:      Effort: Pulmonary effort is normal.      Breath sounds: Normal breath sounds.   Abdominal:      General: Bowel sounds are normal. There is no distension or abdominal bruit.      Palpations: Abdomen is soft. There is no mass.      Tenderness: There is no abdominal tenderness.   Musculoskeletal:         General: Normal range of motion.      Cervical back: Neck supple.   Lymphadenopathy:      Cervical: No cervical adenopathy.   Skin:     General: Skin is warm and dry.   Neurological:      Mental Status: She is alert and oriented to person, place, and time.      Cranial Nerves: No cranial nerve deficit.      Sensory: No sensory deficit.          CT Angiogram Chest Final result 4/22/2024          Narrative   CT ANGIOGRAM CHEST- 4/22/2024 10:27 AM     HISTORY: Aortic aneurysm, known or suspected; I71.012-Dissection of  descending thoracic aorta     COMPARISON: 9/26/2023     TOTAL DOSE LENGTH PRODUCT: 1873.88 mGy.cm. Automated exposure control  was also utilized to  decrease patient radiation dose.    ...   Impression   1. Stable positioning of the endovascular graft of the arch and  descending thoracic aorta. Stable distention of the thoracic aorta  (ascending thoracic aorta measuring up to 4.8 cm with the distal  descending thoracic aorta measuring 4.5 cm). No dissection plaque seen  of the thoracic aorta. Partial visualization of an intimal flap within  the superior abdominal aorta. Patent stents of the left subclavian and  celiac arteries. Similar cardiomegaly. No pericardial effusion.  2. Dilated pulmonary arteries often seen pulmonary to hypertension.     This report was signed and finalized on 4/22/2024 1:05 PM by Dr. Yoder.           Study Result    Narrative & Impression   EXAMINATION: CT ANGIOGRAM ABDOMEN PELVIS-      4/22/2024 10:27 AM     HISTORY: aortic aneurysm; I71.012-Dissection of descending thoracic  aorta     In order to have a CT radiation dose as low as reasonably achievable  Automated Exposure Control was utilized for adjustment of the mA and/or  KV according to patient size.     Total DLP = 1873.88 mGy.cm     The CT scan of the abdomen pelvis is performed after intravenous  contrast enhancement.     The images are acquired in axial plane and subsequent reconstruction in  coronal and sagittal planes.     Comparison is made with the previous study dated 3/6/2013.     The repair of the type B aortic aneurysm with no thoracic aortic  endograft is reidentified. Aortic lumen measures 5 cm x 4.5 cm above the  aortic hiatus. It measured 5 cm x 5 cm in the previous study. Endograft  is in place and stops short of the aortic hiatus.     The dissection starts adjacent and below the level of the distal aortic  endograft and extending throughout the abdominal aorta. This is similar  to the previous study.     A small focus of endoleak is seen along the left posterolateral aspect  of the proximal abdominal aorta adjacent and below the aortic hiatus,  just adjacent to the  distal end of the endograft. This is similar to the  previous study.     A patent celiac stent is seen in place. It arises from the true lumen.  Good opacification of the celiac artery and branches.     Normal opacification of the superior mesenteric artery and branches  which arises from the true lumen.     Both renal arteries arise from the true lumen. The false lumen wraps  around the origin of the left renal artery?. This is similar to the  previous study the inferior mesenteric artery appears to arise from the  anterior medial margin of both true and false lumen as in the previous  study.     The dissection extends into the left common iliac artery. Normal right  common iliac artery and branches are seen.     The dissection is not well visualized below the level of the distal left  common iliac artery. There appears to be good opacification of the left  internal and external iliac arteries. Normal opacification of common  femoral artery bilaterally is seen which bifurcate into superficial and  deep femoral arteries.     Lung bases included in the study are unremarkable for mild atelectatic  change in the right lower lung.     The liver and spleen are normal.     No radiopaque gallstones.     The pancreas is normal.     Both adrenal glands are normal.     Normal kidneys bilaterally are seen with normal and symmetrical  nephrogram. No calculi. No hydronephrosis. Ureters are normal and  nondistended. The urinary bladder is poorly distended. No intrinsic  abnormality.     Moderate fullness of the uterus is seen which is anteverted compressing  on the urinary bladder. No adnexal masses.     Stomach is decompressed with moderate wall thickening. No focal  abnormality.     Normal nondistended small bowel. Normal appendix. Moderate gas and stool  is in the colon. No obstruction.     No evidence of abdominal or pelvic lymphadenopathy.     Images reviewed in bone windows show no acute bony abnormality.  Hemangioma  vertebral L2 is noted.     IMPRESSION:  1. Moderate decrease in size of the distal thoracic aortic aneurysm at  the aortic hiatus. An endograft in place. A small focus of endoleak  (type I) in the proximal abdominal aorta, adjacent and below the aortic  hiatus, adjacent and below the distal end of the endograft, which is  similar to the previous study.  2. A patent celiac stent in place.  3. Evidence of a type B dissection of the abdominal aorta. Origin of all  vessels is predominantly from the true lumen. The dissection extends  into the left common iliac artery and stops adjacent and proximal to the  bifurcation. No change in the previous study.        This report was signed and finalized on 4/22/2024 1:30 PM by Dr. Ingrid Valdez MD.              Patient Active Problem List   Diagnosis   • Dissection of descending thoracic aorta   • Nausea & vomiting   • Aortic dissection   • Essential hypertension   • SVT (supraventricular tachycardia)   • Aneurysm of ascending aorta without rupture   • Class 1 obesity due to excess calories with serious comorbidity and body mass index (BMI) of 32.0 to 32.9 in adult   • Pulmonary embolism       ICD-10-CM ICD-9-CM   1. Dissection of descending thoracic aorta  I71.012 441.01   2. Essential hypertension  I10 401.9           PLAN: After thoroughly evaluating Shilpa Zhang, I believe the best course of action is to remain conservative from vascular surgery standpoint.  I did review her testing as well as review with Dr. Farrar.  Her subclavian stent and celiac artery stent appear widely patent.  Aneurysm in the abdominal portion is more thrombosed and discussed this is likely retrograde flow in the false lumen and the level of distal stent.  We will see her back in 6 months with a repeat CTA of the chest abdomen and pelvis.  I did discuss vascular risk factors as they pertain to the progression of vascular disease including controlling her hypertension.  Her blood pressure  stable on her current medications.   This was all discussed in full with complete understanding.  Thank you for allowing me to participate in the care of your patient.  Please do not hesitate to call with any questions or concerns.  We will keep you aware of any further encounters with hSilpa Zhang.      Sincerely Yours,        ARTI Bower

## 2024-06-07 NOTE — PROGRESS NOTES
"06/07/2024       Gill Ordoñez MD  [address] [place] [address] [address] [place] [id number]        Shilpa Melgar Ray  1965      Chief Complaint   Patient presents with    Follow-up     6 month follow up w/ testing done 4/22/24. Last seen 10/24/23. Patient denies any new or worsening back or abdominal pains.        Dear Gill Ordoñez MD:   I had the pleasure of seeing you patient in the office today for follow up.  As you recall, the patient is a 59 y.o. female who we are currently following for routine health maintenance.  She did undergo TEVAR on 2/2/23.  Overall she is doing well and denies any worsening back or abdominal pains.  He reports she is feeling well other than fatigue. She is maintained on Eliquis and Plavix.  Her incisions healed without difficulty. She did have noninvasive testing performed which I did personally review.      Review of Systems   Constitutional:  Positive for fatigue.   HENT: Negative.     Eyes: Negative.    Respiratory: Negative.     Cardiovascular: Negative.    Gastrointestinal: Negative.    Endocrine: Negative.    Genitourinary: Negative.    Musculoskeletal: Negative.    Skin: Negative.    Allergic/Immunologic: Negative.    Neurological:  Positive for weakness.   Hematological: Negative.    Psychiatric/Behavioral: Negative.     All other systems reviewed and are negative.       /58   Pulse 76   Ht 165.1 cm (65\")   Wt 87.1 kg (192 lb)   SpO2 98%   BMI 31.95 kg/m²    Physical Exam  Vitals and nursing note reviewed.   Constitutional:       Appearance: She is well-developed.   HENT:      Head: Normocephalic and atraumatic.   Eyes:      General: No scleral icterus.     Pupils: Pupils are equal, round, and reactive to light.   Neck:      Thyroid: No thyromegaly.      Vascular: No carotid bruit or JVD.   Cardiovascular:      Rate and Rhythm: Normal rate and regular rhythm.      Pulses:           Carotid pulses are 2+ on the right side and 2+ on the left side.       " Radial pulses are 2+ on the right side and 2+ on the left side.        Femoral pulses are 2+ on the right side and 2+ on the left side.       Popliteal pulses are 2+ on the right side and 2+ on the left side.        Dorsalis pedis pulses are 2+ on the right side and 2+ on the left side.        Posterior tibial pulses are 2+ on the right side and 2+ on the left side.      Heart sounds: Normal heart sounds.   Pulmonary:      Effort: Pulmonary effort is normal.      Breath sounds: Normal breath sounds.   Abdominal:      General: Bowel sounds are normal. There is no distension or abdominal bruit.      Palpations: Abdomen is soft. There is no mass.      Tenderness: There is no abdominal tenderness.   Musculoskeletal:         General: Normal range of motion.      Cervical back: Neck supple.   Lymphadenopathy:      Cervical: No cervical adenopathy.   Skin:     General: Skin is warm and dry.   Neurological:      Mental Status: She is alert and oriented to person, place, and time.      Cranial Nerves: No cranial nerve deficit.      Sensory: No sensory deficit.          CT Angiogram Chest Final result 4/22/2024          Narrative   CT ANGIOGRAM CHEST- 4/22/2024 10:27 AM     HISTORY: Aortic aneurysm, known or suspected; I71.012-Dissection of  descending thoracic aorta     COMPARISON: 9/26/2023     TOTAL DOSE LENGTH PRODUCT: 1873.88 mGy.cm. Automated exposure control  was also utilized to decrease patient radiation dose.    ...   Impression   1. Stable positioning of the endovascular graft of the arch and  descending thoracic aorta. Stable distention of the thoracic aorta  (ascending thoracic aorta measuring up to 4.8 cm with the distal  descending thoracic aorta measuring 4.5 cm). No dissection plaque seen  of the thoracic aorta. Partial visualization of an intimal flap within  the superior abdominal aorta. Patent stents of the left subclavian and  celiac arteries. Similar cardiomegaly. No pericardial effusion.  2. Dilated  pulmonary arteries often seen pulmonary to hypertension.     This report was signed and finalized on 4/22/2024 1:05 PM by Dr. Yoder.           Study Result    Narrative & Impression   EXAMINATION: CT ANGIOGRAM ABDOMEN PELVIS-      4/22/2024 10:27 AM     HISTORY: aortic aneurysm; I71.012-Dissection of descending thoracic  aorta     In order to have a CT radiation dose as low as reasonably achievable  Automated Exposure Control was utilized for adjustment of the mA and/or  KV according to patient size.     Total DLP = 1873.88 mGy.cm     The CT scan of the abdomen pelvis is performed after intravenous  contrast enhancement.     The images are acquired in axial plane and subsequent reconstruction in  coronal and sagittal planes.     Comparison is made with the previous study dated 3/6/2013.     The repair of the type B aortic aneurysm with no thoracic aortic  endograft is reidentified. Aortic lumen measures 5 cm x 4.5 cm above the  aortic hiatus. It measured 5 cm x 5 cm in the previous study. Endograft  is in place and stops short of the aortic hiatus.     The dissection starts adjacent and below the level of the distal aortic  endograft and extending throughout the abdominal aorta. This is similar  to the previous study.     A small focus of endoleak is seen along the left posterolateral aspect  of the proximal abdominal aorta adjacent and below the aortic hiatus,  just adjacent to the distal end of the endograft. This is similar to the  previous study.     A patent celiac stent is seen in place. It arises from the true lumen.  Good opacification of the celiac artery and branches.     Normal opacification of the superior mesenteric artery and branches  which arises from the true lumen.     Both renal arteries arise from the true lumen. The false lumen wraps  around the origin of the left renal artery?. This is similar to the  previous study the inferior mesenteric artery appears to arise from the  anterior medial margin  of both true and false lumen as in the previous  study.     The dissection extends into the left common iliac artery. Normal right  common iliac artery and branches are seen.     The dissection is not well visualized below the level of the distal left  common iliac artery. There appears to be good opacification of the left  internal and external iliac arteries. Normal opacification of common  femoral artery bilaterally is seen which bifurcate into superficial and  deep femoral arteries.     Lung bases included in the study are unremarkable for mild atelectatic  change in the right lower lung.     The liver and spleen are normal.     No radiopaque gallstones.     The pancreas is normal.     Both adrenal glands are normal.     Normal kidneys bilaterally are seen with normal and symmetrical  nephrogram. No calculi. No hydronephrosis. Ureters are normal and  nondistended. The urinary bladder is poorly distended. No intrinsic  abnormality.     Moderate fullness of the uterus is seen which is anteverted compressing  on the urinary bladder. No adnexal masses.     Stomach is decompressed with moderate wall thickening. No focal  abnormality.     Normal nondistended small bowel. Normal appendix. Moderate gas and stool  is in the colon. No obstruction.     No evidence of abdominal or pelvic lymphadenopathy.     Images reviewed in bone windows show no acute bony abnormality.  Hemangioma vertebral L2 is noted.     IMPRESSION:  1. Moderate decrease in size of the distal thoracic aortic aneurysm at  the aortic hiatus. An endograft in place. A small focus of endoleak  (type I) in the proximal abdominal aorta, adjacent and below the aortic  hiatus, adjacent and below the distal end of the endograft, which is  similar to the previous study.  2. A patent celiac stent in place.  3. Evidence of a type B dissection of the abdominal aorta. Origin of all  vessels is predominantly from the true lumen. The dissection extends  into the left  common iliac artery and stops adjacent and proximal to the  bifurcation. No change in the previous study.        This report was signed and finalized on 4/22/2024 1:30 PM by Dr. Ingrid Valdez MD.              Patient Active Problem List   Diagnosis    Dissection of descending thoracic aorta    Nausea & vomiting    Aortic dissection    Essential hypertension    SVT (supraventricular tachycardia)    Aneurysm of ascending aorta without rupture    Class 1 obesity due to excess calories with serious comorbidity and body mass index (BMI) of 32.0 to 32.9 in adult    Pulmonary embolism       ICD-10-CM ICD-9-CM   1. Dissection of descending thoracic aorta  I71.012 441.01   2. Essential hypertension  I10 401.9           PLAN: After thoroughly evaluating Shilpa Zhang, I believe the best course of action is to remain conservative from vascular surgery standpoint.  I did review her testing as well as review with Dr. Farrar.  Her subclavian stent and celiac artery stent appear widely patent.  Aneurysm in the abdominal portion is more thrombosed and discussed this is likely retrograde flow in the false lumen and the level of distal stent.  We will see her back in 6 months with a repeat CTA of the chest abdomen and pelvis.  I did discuss vascular risk factors as they pertain to the progression of vascular disease including controlling her hypertension.  Her blood pressure stable on her current medications.   This was all discussed in full with complete understanding.  Thank you for allowing me to participate in the care of your patient.  Please do not hesitate to call with any questions or concerns.  We will keep you aware of any further encounters with Shilpa Zhang.      Sincerely Yours,        ARTI Bower

## 2024-07-29 ENCOUNTER — TELEPHONE (OUTPATIENT)
Dept: CARDIOLOGY | Facility: CLINIC | Age: 59
End: 2024-07-29
Payer: MEDICAID

## 2024-07-29 NOTE — TELEPHONE ENCOUNTER
Caller: Shilpa Zhang    Relationship to patient: Self    Best call back number: [phone]     Chief complaint: TELEHEALTH VISIT     Type of visit: FOLLOW UP     Requested date: 7.30.24      If rescheduling, when is the original appointment: 7.30.24     Additional notes:PATIENT IS ASKING FOR A TELEHEALTH VISIT.

## 2025-02-13 RX ORDER — ATORVASTATIN CALCIUM 40 MG/1
40 TABLET, FILM COATED ORAL DAILY
Qty: 90 TABLET | Refills: 0 | Status: SHIPPED | OUTPATIENT
Start: 2025-02-13

## 2025-02-13 RX ORDER — AMLODIPINE BESYLATE 10 MG/1
10 TABLET ORAL
Qty: 90 TABLET | Refills: 3 | Status: SHIPPED | OUTPATIENT
Start: 2025-02-13 | End: 2026-02-13

## 2025-02-13 NOTE — TELEPHONE ENCOUNTER
Last seen 4/2024.  Pt needs to make appt with Dr. Lancaster to get further refills.  He has seen Tessa for EP.

## 2025-03-22 RX ORDER — FLECAINIDE ACETATE 50 MG/1
50 TABLET ORAL 2 TIMES DAILY
Qty: 60 TABLET | Refills: 11 | Status: SHIPPED | OUTPATIENT
Start: 2025-03-22

## 2025-04-22 ENCOUNTER — TELEPHONE (OUTPATIENT)
Dept: CARDIAC SURGERY | Facility: CLINIC | Age: 60
End: 2025-04-22
Payer: MEDICAID

## 2025-04-22 NOTE — TELEPHONE ENCOUNTER
"Called pt re: upcoming appointments. Rang several times with no answer and no voicemail. The CT's are ordered by Dr. Farrar's office and pt should keep those appointments to also follow up with out office.    If pt calls back, please relay the following:    Relay     \"    You are scheduled for a follow up with Dr. Jefferson on 05/22/2025 at 11:30. You have CT's that are scheduled prior to that office visit that were ordered and scheduled by Dr. Farrar's office. Please keep those scheduled appointments. You will need to be fasting for the CT's. Please have nothing to eat or drink for 6 hours prior to your appointment, except your medications.\"                  Please document in this encounter if pt returns call.  "

## 2025-04-23 NOTE — TELEPHONE ENCOUNTER
Called again re: the above. Rang several times with no answer and no voicemail. Please see above message. If pt returns call, please advise her of the above and document in this encounter.

## 2025-04-25 NOTE — TELEPHONE ENCOUNTER
Unable to reach pt again - after multiple attempts. I have mailed out appointment reminder letters for the CT Angiogram Chest/Abdomen/Pelvis, appointment reminder for ARTI Bower and appointment reminder for Dr. Jefferson - all for 05/22/2025.

## 2025-06-05 RX ORDER — ATORVASTATIN CALCIUM 40 MG/1
40 TABLET, FILM COATED ORAL DAILY
Qty: 30 TABLET | Refills: 0 | Status: SHIPPED | OUTPATIENT
Start: 2025-06-05

## 2025-07-07 RX ORDER — ATORVASTATIN CALCIUM 40 MG/1
40 TABLET, FILM COATED ORAL DAILY
Refills: 0 | OUTPATIENT
Start: 2025-07-07

## (undated) DEVICE — TOTAL TRAY, 16FR 10ML SIL FOLEY, URN: Brand: MEDLINE

## (undated) DEVICE — PAD MAJOR VASCULAR: Brand: MEDLINE INDUSTRIES, INC.

## (undated) DEVICE — GLV SURG DERMASSURE GRN LF PF 8.0

## (undated) DEVICE — PROTECTOR ULNA NERV

## (undated) DEVICE — PROXIMATE RH ROTATING HEAD SKIN STAPLERS (35 REGULAR) CONTAINS 35 STAINLESS STEEL STAPLES: Brand: PROXIMATE

## (undated) DEVICE — STERILE (14X122CM) TELESCOPICALLY-FOLDED COVER: Brand: CIV-CLEAR™ TRANSDUCER COVER

## (undated) DEVICE — TRAP FLD MINIVAC MEGADYNE 100ML

## (undated) DEVICE — CLTH CLENS READYCLEANSE PERI CARE PK/5

## (undated) DEVICE — ELCA™ CORONARY LASER ATHERECTOMY CATHETER: Brand: ELCA™

## (undated) DEVICE — DRSNG SURESITE WNDW 4X4.5

## (undated) DEVICE — Device

## (undated) DEVICE — ANTIBACTERIAL UNDYED BRAIDED (POLYGLACTIN 910), SYNTHETIC ABSORBABLE SUTURE: Brand: COATED VICRYL

## (undated) DEVICE — ACCUDRAIN® EXTERNAL CSF DRAINAGE SYSTEM: Brand: ACCUDRAIN®

## (undated) DEVICE — PERCLOSE PROGLIDE™ SUTURE-MEDIATED CLOSURE SYSTEM: Brand: PERCLOSE PROGLIDE™

## (undated) DEVICE — DESTINATION CAROTID GUIDING SHEATH: Brand: DESTINATION

## (undated) DEVICE — SUT PROLN 5/0 C1 DA 24IN 8725H

## (undated) DEVICE — 1010 S-DRAPE TOWEL DRAPE 10/BX: Brand: STERI-DRAPE™

## (undated) DEVICE — BIOPATCH™ ANTIMICROBIAL DRESSING WITH CHLORHEXIDINE GLUCONATE IS A HYDROPHILLIC POLYURETHANE ABSORPTIVE FOAM WITH CHLORHEXIDINE GLUCONATE (CHG) WHICH INHIBITS BACTERIAL GROWTH UNDER THE DRESSING. THE DRESSING IS INTENDED TO BE USED TO ABSORB EXUDATE, COVER A WOUND CAUSED BY VASCULAR AND NONVASCULAR PERCUTANEOUS MEDICAL DEVICES DURING SURGERY, AS WELL AS REDUCE LOCAL INFECTION AND COLONIZATION OF MICROORGANISMS.: Brand: BIOPATCH

## (undated) DEVICE — SPNG GZ STRL 2S 4X4 12PLY

## (undated) DEVICE — HERMETIC™ LUMBAR CATHETER OPEN TIP: Brand: HERMETIC™

## (undated) DEVICE — SUT SILK 3/0 SH CR8 18IN C013D

## (undated) DEVICE — GLV SURG SENSICARE W/ALOE PF LF 7.5 STRL

## (undated) DEVICE — DRP SPECIAL PROC 4PC W/FC POUCH

## (undated) DEVICE — APPL DURAPREP IODOPHOR APL 26ML

## (undated) DEVICE — GLV SURG BIOGEL LTX PF 7 1/2

## (undated) DEVICE — TOWEL,OR,DSP,ST,BLUE,STD,4/PK,20PK/CS: Brand: MEDLINE

## (undated) DEVICE — COVER,MAYO STAND,STERILE: Brand: MEDLINE

## (undated) DEVICE — NDL HYPO PRECISIONGLIDE REG 22G 1 1/2

## (undated) DEVICE — BALN EVERCROSS OTW .035 5F 6X20 135

## (undated) DEVICE — NDL HYPO PRECISIONGLIDE REG 25G 1 1/2

## (undated) DEVICE — SUT MNCRYL 4/0 PS2 27IN UD MCP426H

## (undated) DEVICE — SNAP KOVER: Brand: UNBRANDED

## (undated) DEVICE — CANN VESL ACRN TP 4MM

## (undated) DEVICE — SCANLAN® VASCU-STATT® II SINGLE-USE BULLDOG CLAMP W/FIRMER CLAMPING PRESS - MAXI ANGLED 45°(ORANGE), CLAMPING PRESSURE 165-175 G (2/STERILE PKG): Brand: SCANLAN® VASCU-STATT® II SINGLE-USE BULLDOG CLAMP W/FIRMER CLAMPING PRESS

## (undated) DEVICE — HI-TORQUE COMMAND ES GUIDE WIRE .014" 300 CM: Brand: HI-TORQUE COMMAND

## (undated) DEVICE — DRSNG SURESITE123 8X12IN

## (undated) DEVICE — CVR UNIV C/ARM

## (undated) DEVICE — BAPTIST TURNOVER KIT: Brand: MEDLINE INDUSTRIES, INC.

## (undated) DEVICE — PROXIMATE RH ROTATING HEAD SKIN STAPLERS (35 WIDE) CONTAINS 35 STAINLESS STEEL STAPLES: Brand: PROXIMATE

## (undated) DEVICE — LIMITORR™ VOLUME LIMITING EXTERNAL CSF DRAINAGE AND MONITORING SYSTEM: Brand: LIMITORR™

## (undated) DEVICE — STERILE ULTRASOUND GEL, SAFEWRAP: Brand: ECOVUE

## (undated) DEVICE — SPNG GZ WOVN 4X4IN 12PLY 10/BX STRL

## (undated) DEVICE — STRIP,CLOSURE,WOUND,MEDI-STRIP,1/2X4: Brand: MEDLINE

## (undated) DEVICE — SYR LUERLOK 50ML

## (undated) DEVICE — CONTAINER,SPECIMEN,OR STERILE,4OZ: Brand: MEDLINE

## (undated) DEVICE — BG BANDED WRUBBER BAND AND TP 36X54IN

## (undated) DEVICE — LP VESL MINI BLU PK/2

## (undated) DEVICE — SYR CONTRL PRESS/LO FIX/M/LL W/THMB/RNG 10ML

## (undated) DEVICE — VIATRAC 14 PLUS PERIPHERAL DILATATION CATHETER 6.0 MM X 30 MM X 80 CM: Brand: VIATRAC

## (undated) DEVICE — GLV SURG BIOGEL LTX PF 6 1/2

## (undated) DEVICE — SHEATH STEER INTRO TOURGUIDE 8.5F 55CM 17MM

## (undated) DEVICE — CATH IMG IVUS VISIONS .035 DIG 8.2F

## (undated) DEVICE — GAUZE,SPONGE,4"X4",16PLY,XRAY,STRL,LF: Brand: MEDLINE

## (undated) DEVICE — DRSNG TELFA PAD NONADH STR 1S 3X8IN

## (undated) DEVICE — APPL CHLORAPREP HI/LITE 26ML ORNG

## (undated) DEVICE — SUT PROLN 7/0 BV1 24IN 4PK M8702

## (undated) DEVICE — WIPE INST 3X3IN 2MM BX/20